# Patient Record
Sex: FEMALE | Race: WHITE | NOT HISPANIC OR LATINO | Employment: OTHER | URBAN - METROPOLITAN AREA
[De-identification: names, ages, dates, MRNs, and addresses within clinical notes are randomized per-mention and may not be internally consistent; named-entity substitution may affect disease eponyms.]

---

## 2017-01-05 ENCOUNTER — LAB REQUISITION (OUTPATIENT)
Dept: LAB | Facility: HOSPITAL | Age: 57
End: 2017-01-05
Payer: COMMERCIAL

## 2017-01-05 DIAGNOSIS — E55.9 VITAMIN D DEFICIENCY: ICD-10-CM

## 2017-01-05 LAB — 25(OH)D3 SERPL-MCNC: 25.5 NG/ML (ref 30–100)

## 2017-01-05 PROCEDURE — 82306 VITAMIN D 25 HYDROXY: CPT | Performed by: INTERNAL MEDICINE

## 2017-01-12 ENCOUNTER — GENERIC CONVERSION - ENCOUNTER (OUTPATIENT)
Dept: OTHER | Facility: OTHER | Age: 57
End: 2017-01-12

## 2017-04-26 ENCOUNTER — ALLSCRIPTS OFFICE VISIT (OUTPATIENT)
Dept: OTHER | Facility: OTHER | Age: 57
End: 2017-04-26

## 2017-06-18 ENCOUNTER — HOSPITAL ENCOUNTER (EMERGENCY)
Facility: HOSPITAL | Age: 57
Discharge: HOME | End: 2017-06-19
Attending: EMERGENCY MEDICINE | Admitting: EMERGENCY MEDICINE
Payer: COMMERCIAL

## 2017-06-18 VITALS
HEART RATE: 83 BPM | DIASTOLIC BLOOD PRESSURE: 66 MMHG | TEMPERATURE: 98.6 F | RESPIRATION RATE: 16 BRPM | BODY MASS INDEX: 31.36 KG/M2 | HEIGHT: 62 IN | SYSTOLIC BLOOD PRESSURE: 126 MMHG | WEIGHT: 170.4 LBS | OXYGEN SATURATION: 97 %

## 2017-06-18 DIAGNOSIS — T14.8XXA MUSCLE STRAIN: Primary | ICD-10-CM

## 2017-06-18 DIAGNOSIS — S16.1XXA CERVICAL STRAIN, INITIAL ENCOUNTER: ICD-10-CM

## 2017-06-18 RX ORDER — QUETIAPINE FUMARATE 25 MG/1
50 TABLET, FILM COATED ORAL 2 TIMES DAILY PRN
COMMUNITY
End: 2018-03-12 | Stop reason: SDUPTHER

## 2017-06-18 RX ORDER — ALPRAZOLAM 2 MG/1
2 TABLET ORAL 2 TIMES DAILY PRN
COMMUNITY
End: 2018-03-12 | Stop reason: SDUPTHER

## 2017-06-18 RX ORDER — ACETAMINOPHEN 325 MG/1
975 TABLET ORAL ONCE
Status: COMPLETED | OUTPATIENT
Start: 2017-06-18 | End: 2017-06-18

## 2017-06-18 RX ADMIN — ACETAMINOPHEN 975 MG: 325 TABLET, FILM COATED ORAL at 23:26

## 2017-06-19 PROCEDURE — 99283 EMERGENCY DEPT VISIT LOW MDM: CPT

## 2017-06-29 ENCOUNTER — GENERIC CONVERSION - ENCOUNTER (OUTPATIENT)
Dept: OTHER | Facility: OTHER | Age: 57
End: 2017-06-29

## 2017-06-30 ENCOUNTER — HOSPITAL ENCOUNTER (EMERGENCY)
Facility: HOSPITAL | Age: 57
Discharge: HOME | End: 2017-06-30
Attending: EMERGENCY MEDICINE | Admitting: EMERGENCY MEDICINE
Payer: COMMERCIAL

## 2017-06-30 VITALS
TEMPERATURE: 98 F | SYSTOLIC BLOOD PRESSURE: 156 MMHG | HEART RATE: 70 BPM | RESPIRATION RATE: 18 BRPM | OXYGEN SATURATION: 97 % | DIASTOLIC BLOOD PRESSURE: 77 MMHG | WEIGHT: 162 LBS | BODY MASS INDEX: 29.63 KG/M2

## 2017-06-30 DIAGNOSIS — T15.90XA FOREIGN BODY IN EYE: Primary | ICD-10-CM

## 2017-06-30 PROCEDURE — 99283 EMERGENCY DEPT VISIT LOW MDM: CPT

## 2017-06-30 RX ORDER — PROPARACAINE HYDROCHLORIDE 5 MG/ML
1 SOLUTION/ DROPS OPHTHALMIC ONCE
Status: COMPLETED | OUTPATIENT
Start: 2017-06-30 | End: 2017-06-30

## 2017-06-30 RX ADMIN — FLUORESCEIN SODIUM 1 STRIP: 1 STRIP OPHTHALMIC at 21:53

## 2017-06-30 RX ADMIN — PROPARACAINE HYDROCHLORIDE 1 DROP: 5 SOLUTION/ DROPS OPHTHALMIC at 21:53

## 2018-01-15 NOTE — MISCELLANEOUS
Provider Comments  Provider Comments:   4/26/17-PT  MISSED APPT  TODAY/CW      Signatures   Electronically signed by : Ming Gale DO;  Apr 28 2017 11:45AM EST                       (Author)

## 2018-01-18 NOTE — MISCELLANEOUS
Provider Comments  Provider Comments:   Dear Brittany James     We called you about your scheduled appointment for 06/29/2017 today but were unable to reach you  It is very important that you follow up with us so that we can assess your physical and nutritional safety  Please call our office at [phone] to reschedule your appointment       Sincerely,     Joss Pal [facility]            Signatures   Electronically signed by : Aniceto Zelaya, ; Jun 29 2017  9:29AM EST                       (Author)

## 2018-03-12 ENCOUNTER — LAB (OUTPATIENT)
Dept: LAB | Facility: CLINIC | Age: 58
End: 2018-03-12
Payer: COMMERCIAL

## 2018-03-12 ENCOUNTER — OFFICE VISIT (OUTPATIENT)
Dept: INTERNAL MEDICINE CLINIC | Facility: CLINIC | Age: 58
End: 2018-03-12
Payer: COMMERCIAL

## 2018-03-12 VITALS
WEIGHT: 157.6 LBS | OXYGEN SATURATION: 98 % | BODY MASS INDEX: 29 KG/M2 | HEIGHT: 62 IN | SYSTOLIC BLOOD PRESSURE: 130 MMHG | HEART RATE: 70 BPM | TEMPERATURE: 98 F | RESPIRATION RATE: 18 BRPM | DIASTOLIC BLOOD PRESSURE: 80 MMHG

## 2018-03-12 DIAGNOSIS — F41.0 PANIC ATTACKS: ICD-10-CM

## 2018-03-12 DIAGNOSIS — F39 MOOD DISORDER (HCC): ICD-10-CM

## 2018-03-12 DIAGNOSIS — F41.9 ANXIETY: ICD-10-CM

## 2018-03-12 DIAGNOSIS — F90.2 ADHD (ATTENTION DEFICIT HYPERACTIVITY DISORDER), COMBINED TYPE: ICD-10-CM

## 2018-03-12 DIAGNOSIS — R07.9 CHEST PAIN, UNSPECIFIED TYPE: ICD-10-CM

## 2018-03-12 DIAGNOSIS — R00.2 PALPITATIONS: Primary | ICD-10-CM

## 2018-03-12 PROBLEM — E66.3 OVERWEIGHT: Status: ACTIVE | Noted: 2018-03-12

## 2018-03-12 LAB
ALBUMIN SERPL BCP-MCNC: 3.9 G/DL (ref 3.5–5)
ALP SERPL-CCNC: 72 U/L (ref 46–116)
ALT SERPL W P-5'-P-CCNC: 20 U/L (ref 12–78)
ANION GAP SERPL CALCULATED.3IONS-SCNC: 9 MMOL/L (ref 4–13)
AST SERPL W P-5'-P-CCNC: 16 U/L (ref 5–45)
BASOPHILS # BLD AUTO: 0.01 THOUSANDS/ΜL (ref 0–0.1)
BASOPHILS NFR BLD AUTO: 0 % (ref 0–1)
BILIRUB SERPL-MCNC: 0.3 MG/DL (ref 0.2–1)
BUN SERPL-MCNC: 16 MG/DL (ref 5–25)
CALCIUM SERPL-MCNC: 9.3 MG/DL (ref 8.3–10.1)
CHLORIDE SERPL-SCNC: 103 MMOL/L (ref 100–108)
CO2 SERPL-SCNC: 28 MMOL/L (ref 21–32)
CREAT SERPL-MCNC: 0.84 MG/DL (ref 0.6–1.3)
ECG INTERP DURING EX: NORMAL MS
EOSINOPHIL # BLD AUTO: 0.08 THOUSAND/ΜL (ref 0–0.61)
EOSINOPHIL NFR BLD AUTO: 1 % (ref 0–6)
ERYTHROCYTE [DISTWIDTH] IN BLOOD BY AUTOMATED COUNT: 15.1 % (ref 11.6–15.1)
GFR SERPL CREATININE-BSD FRML MDRD: 76 ML/MIN/1.73SQ M
GLUCOSE SERPL-MCNC: 83 MG/DL (ref 65–140)
HCT VFR BLD AUTO: 38.8 % (ref 34.8–46.1)
HGB BLD-MCNC: 13.5 G/DL (ref 11.5–15.4)
LYMPHOCYTES # BLD AUTO: 2.67 THOUSANDS/ΜL (ref 0.6–4.47)
LYMPHOCYTES NFR BLD AUTO: 34 % (ref 14–44)
MCH RBC QN AUTO: 28.1 PG (ref 26.8–34.3)
MCHC RBC AUTO-ENTMCNC: 34.8 G/DL (ref 31.4–37.4)
MCV RBC AUTO: 81 FL (ref 82–98)
MONOCYTES # BLD AUTO: 0.42 THOUSAND/ΜL (ref 0.17–1.22)
MONOCYTES NFR BLD AUTO: 5 % (ref 4–12)
NEUTROPHILS # BLD AUTO: 4.78 THOUSANDS/ΜL (ref 1.85–7.62)
NEUTS SEG NFR BLD AUTO: 60 % (ref 43–75)
PLATELET # BLD AUTO: 295 THOUSANDS/UL (ref 149–390)
PMV BLD AUTO: 9.7 FL (ref 8.9–12.7)
POTASSIUM SERPL-SCNC: 4.3 MMOL/L (ref 3.5–5.3)
PROT SERPL-MCNC: 7.6 G/DL (ref 6.4–8.2)
RBC # BLD AUTO: 4.81 MILLION/UL (ref 3.81–5.12)
SODIUM SERPL-SCNC: 140 MMOL/L (ref 136–145)
T4 FREE SERPL-MCNC: 0.99 NG/DL (ref 0.76–1.46)
TSH SERPL DL<=0.05 MIU/L-ACNC: 4.38 UIU/ML (ref 0.36–3.74)
WBC # BLD AUTO: 7.96 THOUSAND/UL (ref 4.31–10.16)

## 2018-03-12 PROCEDURE — 84443 ASSAY THYROID STIM HORMONE: CPT | Performed by: INTERNAL MEDICINE

## 2018-03-12 PROCEDURE — 84439 ASSAY OF FREE THYROXINE: CPT | Performed by: INTERNAL MEDICINE

## 2018-03-12 PROCEDURE — 93010 ELECTROCARDIOGRAM REPORT: CPT | Performed by: INTERNAL MEDICINE

## 2018-03-12 PROCEDURE — 80053 COMPREHEN METABOLIC PANEL: CPT | Performed by: INTERNAL MEDICINE

## 2018-03-12 PROCEDURE — 85025 COMPLETE CBC W/AUTO DIFF WBC: CPT | Performed by: INTERNAL MEDICINE

## 2018-03-12 PROCEDURE — 36415 COLL VENOUS BLD VENIPUNCTURE: CPT | Performed by: INTERNAL MEDICINE

## 2018-03-12 PROCEDURE — 99214 OFFICE O/P EST MOD 30 MIN: CPT | Performed by: INTERNAL MEDICINE

## 2018-03-12 RX ORDER — DEXTROAMPHETAMINE SACCHARATE, AMPHETAMINE ASPARTATE, DEXTROAMPHETAMINE SULFATE AND AMPHETAMINE SULFATE 5; 5; 5; 5 MG/1; MG/1; MG/1; MG/1
3 TABLET ORAL DAILY
COMMUNITY
Start: 2014-08-13 | End: 2018-03-12 | Stop reason: SDUPTHER

## 2018-03-12 RX ORDER — SERTRALINE HYDROCHLORIDE 100 MG/1
100 TABLET, FILM COATED ORAL 2 TIMES DAILY
Refills: 0 | COMMUNITY
Start: 2018-02-19 | End: 2020-08-19 | Stop reason: ALTCHOICE

## 2018-03-12 RX ORDER — QUETIAPINE FUMARATE 25 MG/1
TABLET, FILM COATED ORAL
Qty: 90 TABLET | Refills: 0
Start: 2018-03-12 | End: 2018-09-17 | Stop reason: SDUPTHER

## 2018-03-12 RX ORDER — DEXTROAMPHETAMINE SACCHARATE, AMPHETAMINE ASPARTATE, DEXTROAMPHETAMINE SULFATE AND AMPHETAMINE SULFATE 5; 5; 5; 5 MG/1; MG/1; MG/1; MG/1
1 TABLET ORAL 3 TIMES DAILY
Qty: 90 TABLET | Refills: 0 | Status: SHIPPED | OUTPATIENT
Start: 2018-03-12 | End: 2018-07-03 | Stop reason: SDUPTHER

## 2018-03-12 RX ORDER — ALPRAZOLAM 2 MG/1
TABLET ORAL
Qty: 75 TABLET | Refills: 0 | Status: SHIPPED | OUTPATIENT
Start: 2018-03-12 | End: 2018-04-17 | Stop reason: SDUPTHER

## 2018-03-12 RX ORDER — TRAZODONE HYDROCHLORIDE 50 MG/1
50 TABLET ORAL 2 TIMES DAILY
Refills: 0 | COMMUNITY
Start: 2018-03-11 | End: 2018-04-17 | Stop reason: SDUPTHER

## 2018-03-12 NOTE — ASSESSMENT & PLAN NOTE
Continue sertraline 200 mg daily  Instructed to take home 1 tablet of trazodone and 2 tablets of Seroquel q h s  to limit vivid dreams  Refilled alprazolam, can take up to 2 5 tablets daily, PDMP reviewed  She has an appointment with Psychiatry later this month

## 2018-03-12 NOTE — PROGRESS NOTES
Assessment/Plan:    Anxiety  Continue sertraline 200 mg daily  Instructed to take home 1 tablet of trazodone and 2 tablets of Seroquel q h s  to limit vivid dreams  Refilled alprazolam, can take up to 2 5 tablets daily, PDMP reviewed  She has an appointment with Psychiatry later this month  ADHD (attention deficit hyperactivity disorder), combined type  Takes Adderall tid  PDMP reviewed  Diagnoses and all orders for this visit:    Palpitations  Comments:  Normal EKG  Epiosodes of dizziness may be related to anxiety, change labs  Orders:  -     POCT ECG  -     CBC and differential  -     Comprehensive metabolic panel  -     TSH, 3rd generation with T4 reflex    ADHD (attention deficit hyperactivity disorder), combined type  -     amphetamine-dextroamphetamine (ADDERALL, 20MG,) 20 mg tablet; Take 1 tablet (20 mg total) by mouth 3 (three) times a day Max Daily Amount: 60 mg    Anxiety  -     ALPRAZolam (XANAX) 2 MG tablet; Take 1 to 1 5 tablets 2x a day as needed  -     QUEtiapine (SEROquel) 25 mg tablet; Take 1-3 tablets qHS prn    Mood disorder (HCC)  -     QUEtiapine (SEROquel) 25 mg tablet; Take 1-3 tablets qHS prn    Panic attacks    Chest pain, unspecified type    Other orders  -     traZODone (DESYREL) 50 mg tablet; Take 50 mg by mouth 2 (two) times a day    -     Discontinue: amphetamine-dextroamphetamine (ADDERALL, 20MG,) 20 mg tablet; Take 3 tablets by mouth daily  -     sertraline (ZOLOFT) 100 mg tablet; Take 100 mg by mouth 2 (two) times a day        Follow up in 1 month  Form to be completed  Subjective:      Patient ID: Bill Dixon is a 59 y o  female  Mrs Acosta complains of palpitations and increased anxiety  She reports that her  has been incarcerated for the past 8 months  She has been dealing with personal and social issues on her own  She does have a son and daughter in the area, feels that they do not want to be involved with her    She is worried about finances, has sold a lot of things  She is unable to find employment  She reports increased anxiety attacks the past few months  She reports that she is feeling more shaky since her  has been away  She noticed that she is feeling more dizzy and noticed some blurring of vision the past few days  Denies any headache, extremity weakness or numbness  She denies any syncopal events  She denies any chest heaviness, just feels that her house is pounding in her chest     She has an appointment with psychiatry and counseling  Her previous psychiatrist, who does not take her insurance, has refilled her medications but she ran out of Xanax about a week ago  She reports vivid dreams if she takes more than 2 tablets of trazodone at night  She feels very shaky if she takes Seroquel more than 2 tablets regularly  She applied insurance with [facility], has a meeting with someone to help her with her resume and and to help her look for a job  The following portions of the patient's history were reviewed and updated as appropriate: current medications, past medical history, past social history and problem list     Review of Systems   Constitutional: Positive for activity change and fatigue  Eyes: Negative for visual disturbance  Respiratory: Negative for cough and shortness of breath  Cardiovascular: Positive for palpitations  Negative for chest pain and leg swelling  Gastrointestinal: Negative for abdominal pain, constipation, diarrhea and nausea  Musculoskeletal: Negative for arthralgias and myalgias  Skin: Negative for rash and wound  Neurological: Negative for dizziness and numbness  Hematological: Does not bruise/bleed easily  Psychiatric/Behavioral: Positive for agitation, behavioral problems and sleep disturbance  Negative for confusion and suicidal ideas  The patient is nervous/anxious            Objective:      /80   Pulse 70   Temp 98 °F (36 7 °C)   Resp 18   Ht 5' 2" (1 575 m)   Wt 71 5 kg (157 lb 9 6 oz)   SpO2 98%   BMI 28 83 kg/m²          Physical Exam   Constitutional: She is oriented to person, place, and time  She appears well-developed and well-nourished  HENT:   Head: Normocephalic and atraumatic  Nose: Nose normal    Eyes: Conjunctivae are normal  Pupils are equal, round, and reactive to light  Neck: Neck supple  Cardiovascular: Normal rate, regular rhythm and normal heart sounds  No chest wall tenderness  No edema  Pulmonary/Chest: Effort normal and breath sounds normal  She has no wheezes  She has no rales  Abdominal: Soft  Bowel sounds are normal    Neurological: She is alert and oriented to person, place, and time  (+) tremors, both hands   Skin: Skin is warm  No rash noted  Psychiatric: Her behavior is normal  Her mood appears anxious  Her speech is not rapid and/or pressured  She is not agitated and not aggressive  Cognition and memory are not impaired  Nursing note and vitals reviewed  EKG: normal EKG, normal sinus rhythm, unchanged from previous tracings

## 2018-03-13 ENCOUNTER — TELEPHONE (OUTPATIENT)
Dept: INTERNAL MEDICINE CLINIC | Facility: CLINIC | Age: 58
End: 2018-03-13

## 2018-03-13 NOTE — TELEPHONE ENCOUNTER
RICHARD HELMS IN [place] CALLED  ADDERALL 20 MG NEEDS A PRIOR AUTH  6  IS THE NUMBER TO CALL TO START THE PRIOR AUTH

## 2018-03-13 NOTE — TELEPHONE ENCOUNTER
Labs all okay except for thyroid, slightly abnormal but not enough to start medication  Will just monitor this for now      Started prior auth for your Adderall

## 2018-03-19 NOTE — TELEPHONE ENCOUNTER
Prior Deneen Alken was never started because we were never able to get a hold of patient to confirm information ( please read below conversation) I tried calling patient again and phone number still unable to receive calls at this time   We can not start without patient calling back

## 2018-03-19 NOTE — TELEPHONE ENCOUNTER
Please call [facility] pharmacy with approval or denial on the adderall   [phone]

## 2018-03-22 NOTE — TELEPHONE ENCOUNTER
Pt  CALLED  SHE SPOKE WITH HER INS  Co  AND THEY SAID TO CALL FOR PRIOR AUTH AGAIN  HER PRESCRIPTION ID No  IS [id number]

## 2018-03-22 NOTE — TELEPHONE ENCOUNTER
Patient is going to call her insurance so that they can provide her with the RX #  She did not have that on her  Will call back with update  Please get RX benefits # when patient calls

## 2018-03-26 NOTE — TELEPHONE ENCOUNTER
Call pharmacy  If patient looking for medication, needs to call office asap    Not returning our calls

## 2018-04-03 NOTE — TELEPHONE ENCOUNTER
Spoke with Adriano Vera at [facility] ([phone])  They states they sent us a form with additional info required- they are faxing this again   Awaiting fax

## 2018-04-05 NOTE — TELEPHONE ENCOUNTER
Spoke with Dr Corene Snellen who states she did not receive this form  Called and LM for fax team to have this sent to our office ASAP

## 2018-04-05 NOTE — TELEPHONE ENCOUNTER
Please call patient  Ask what medication besides Adderall has she tried in the past? Need it for the prior auth  What medications has she tried, side effects etc

## 2018-04-06 NOTE — TELEPHONE ENCOUNTER
Pt notified and states she is unsure what the other medication she tried was but did have a side effect to it   States she will call her last dr who prescribed it for the info and will cb

## 2018-04-17 DIAGNOSIS — F41.9 ANXIETY: ICD-10-CM

## 2018-04-18 RX ORDER — ALPRAZOLAM 2 MG/1
TABLET ORAL
Qty: 75 TABLET | Refills: 0 | Status: SHIPPED | OUTPATIENT
Start: 2018-04-18 | End: 2018-05-26 | Stop reason: SDUPTHER

## 2018-04-18 RX ORDER — TRAZODONE HYDROCHLORIDE 50 MG/1
50 TABLET ORAL 2 TIMES DAILY
Qty: 60 TABLET | Refills: 0 | Status: SHIPPED | OUTPATIENT
Start: 2018-04-18 | End: 2018-05-26 | Stop reason: SDUPTHER

## 2018-04-20 ENCOUNTER — TELEPHONE (OUTPATIENT)
Dept: INTERNAL MEDICINE CLINIC | Facility: CLINIC | Age: 58
End: 2018-04-20

## 2018-04-20 NOTE — TELEPHONE ENCOUNTER
Can have [facility] fax to us a form that I can complete, it will only give her < 30 days to settle the account    Let her know she missed her appt yesterday, will not refill any of her meds if not seen next time

## 2018-04-20 NOTE — TELEPHONE ENCOUNTER
Gretel Marie TO CALL HER Re: HER MEDICAL CONDITION-SHE NEEDS HELP WITH CALLING [facility] BECAUSE THEY ARE THREATENING TO TURN OFF HER ELECTRICITY  HER  IS AWAY AND IS SUPPOSED TO BE COMING HOME NEXT WEEK AND WILL BE ABLE TO GET THINGS STRAIGHTENED OUT  SHE IS HAVING A HARD TIME LEAVING THE HOUSE DUE TO HER CONDITION

## 2018-04-24 ENCOUNTER — TELEPHONE (OUTPATIENT)
Dept: INTERNAL MEDICINE CLINIC | Facility: CLINIC | Age: 58
End: 2018-04-24

## 2018-04-24 NOTE — TELEPHONE ENCOUNTER
Pt  CALLED TO SCHEDULE AN APPT  NO OPENINGS UNTIL June  PT  SAID SHE WILL HAVE A RIDE TODAY AND TOMORROW  Dr Barbara Giraldo CAN SEE PSYCHIATRIST  SHE IS ON ADERALL BECAUSE HER OLD PSYCH TOOK HER OFF OF [facility] BECAUSE IT MADE HER SHAKY  SHE DID TRY OTHER MEDS FOR AWHILE BUT HE HAD HER ON ADERALL UNTIL SHE SEES ANOTHER PSYCHIATRIST  IT IS HELPING HER  SHE IS GOING TO SEE Dr Joe Ponce  ALSO, SHE WANTS US TO CALL [facility] TO FAX US A FORM SO THEY WON'T SHUT OFF HER ELECTRICITY  CALL THEM [phone]  FAX # [phone]  HER ACCT  No  IS [id number]

## 2018-04-24 NOTE — TELEPHONE ENCOUNTER
Can schedule for 1 pm tomorrow  Need to keep that appt    Will restart prior auth for Adderall again      Please call [facility] to fax us form

## 2018-04-25 ENCOUNTER — OFFICE VISIT (OUTPATIENT)
Dept: INTERNAL MEDICINE CLINIC | Facility: CLINIC | Age: 58
End: 2018-04-25
Payer: COMMERCIAL

## 2018-04-25 VITALS
SYSTOLIC BLOOD PRESSURE: 116 MMHG | HEART RATE: 78 BPM | DIASTOLIC BLOOD PRESSURE: 80 MMHG | RESPIRATION RATE: 18 BRPM | OXYGEN SATURATION: 98 % | WEIGHT: 154.4 LBS | HEIGHT: 62 IN | BODY MASS INDEX: 28.41 KG/M2 | TEMPERATURE: 97.9 F

## 2018-04-25 DIAGNOSIS — F39 MOOD DISORDER (HCC): ICD-10-CM

## 2018-04-25 DIAGNOSIS — F90.2 ADHD (ATTENTION DEFICIT HYPERACTIVITY DISORDER), COMBINED TYPE: ICD-10-CM

## 2018-04-25 DIAGNOSIS — F41.9 ANXIETY: ICD-10-CM

## 2018-04-25 DIAGNOSIS — R22.1 NECK FULLNESS: Primary | ICD-10-CM

## 2018-04-25 PROBLEM — E55.9 VITAMIN D DEFICIENCY: Status: ACTIVE | Noted: 2018-04-25

## 2018-04-25 PROBLEM — R79.89 ABNORMAL THYROID BLOOD TEST: Status: ACTIVE | Noted: 2018-04-25

## 2018-04-25 PROCEDURE — 3008F BODY MASS INDEX DOCD: CPT | Performed by: INTERNAL MEDICINE

## 2018-04-25 PROCEDURE — 99214 OFFICE O/P EST MOD 30 MIN: CPT | Performed by: INTERNAL MEDICINE

## 2018-04-25 NOTE — PROGRESS NOTES
Assessment/Plan:    Anxiety  Improved since  coming home soon  She is on sertraline 200 mg daily, trazodone 100 mg daily, Seroquel prn  She takes alprazolam regularly, discussed to try and take less in the morning  Keep appointment with psychiatry in August     ADHD (attention deficit hyperactivity disorder), combined type  Resubmit prior auth for Adderall  She was on Vyvanse in the past     Abnormal thyroid blood test  She is feeling better, no medications at this time  Will just monitor symptoms  Vitamin D deficiency  Start daily supplement  Diagnoses and all orders for this visit:    Neck fullness  Comments:  Check thyroid ultrasound, no palpable masses  Orders:  -     US thyroid; Future    Anxiety    ADHD (attention deficit hyperactivity disorder), combined type    Mood disorder (HCC)          Subjective:      Patient ID: Terrell Craig is a 59 y o  female  Mrs Garland Sacks is doing better since her last visit  Her  will be released within the next week, she feels more comfortable now that he is coming back home  She continues to get very anxious, not trembling any more  She takes sertraline daily, takes Seroquel only as needed  She would usually take half tablet as needed when she feels a panic attack is coming  She continues to take Xanax regularly  She tries not to take it in the morning if she feels well  She scheduled an appointment with Psychiatry, not until August     She has been without Adderall for the past few weeks  She feels she is doing okay but still having difficulty focusing  She also complains of fullness in her neck, feels her glands are swollen  Denies any recent sore throat or colds  The following portions of the patient's history were reviewed and updated as appropriate: allergies, current medications, past medical history, past social history and problem list     Review of Systems   Constitutional: Negative for appetite change and fatigue  HENT: Negative for congestion, ear pain and postnasal drip  Eyes: Negative for visual disturbance  Respiratory: Negative for cough and shortness of breath  Cardiovascular: Negative for chest pain and leg swelling  Gastrointestinal: Negative for abdominal pain, constipation and diarrhea  Genitourinary: Negative for dysuria, frequency and urgency  Musculoskeletal: Negative for arthralgias and myalgias  Skin: Negative for rash and wound  Neurological: Negative for dizziness, numbness and headaches  Hematological: Does not bruise/bleed easily  Psychiatric/Behavioral: Positive for dysphoric mood and sleep disturbance  Negative for confusion  The patient is nervous/anxious  Objective:      /80   Pulse 78   Temp 97 9 °F (36 6 °C)   Resp 18   Ht 5' 2" (1 575 m)   Wt 70 kg (154 lb 6 4 oz)   SpO2 98%   BMI 28 24 kg/m²          Physical Exam   Constitutional: She is oriented to person, place, and time  She appears well-developed and well-nourished  HENT:   Head: Normocephalic and atraumatic  Nose: Nose normal    Eyes: Conjunctivae are normal  Pupils are equal, round, and reactive to light  Neck: Neck supple  Cardiovascular: Normal rate, regular rhythm and normal heart sounds  No edema  Pulmonary/Chest: Effort normal and breath sounds normal  She has no wheezes  She has no rales  Abdominal: Soft  Bowel sounds are normal    Neurological: She is alert and oriented to person, place, and time  Skin: Skin is warm  No rash noted  Psychiatric: She has a normal mood and affect  Her behavior is normal    Nursing note and vitals reviewed  Medications and lab results reviewed with patient

## 2018-04-25 NOTE — ASSESSMENT & PLAN NOTE
Improved since  coming home soon  She is on sertraline 200 mg daily, trazodone 100 mg daily, Seroquel prn  She takes alprazolam regularly, discussed to try and take less in the morning    Keep appointment with psychiatry in August

## 2018-04-25 NOTE — TELEPHONE ENCOUNTER
Resubmitted PA for medication       Called [facility] and spoke with Brandy, they are faxing a form over to be completed

## 2018-04-30 ENCOUNTER — TELEPHONE (OUTPATIENT)
Dept: INTERNAL MEDICINE CLINIC | Facility: CLINIC | Age: 58
End: 2018-04-30

## 2018-04-30 NOTE — TELEPHONE ENCOUNTER
[facility] called and stated Adderall 20 mg tablet will need a PA  It is being rejected by insurance due to patients age  Pt insurance [facility] phone # [phone]

## 2018-05-02 NOTE — TELEPHONE ENCOUNTER
[facility] called asking if there is an update on the prior auth for the adderall  Call [facility] at [phone]

## 2018-05-02 NOTE — TELEPHONE ENCOUNTER
[facility] and spoke with pharmacist  Tracey Pichardo them list of pts symptoms  This is being approved for 6 months- they will fax info to office   Notified pharmacy

## 2018-05-26 DIAGNOSIS — F41.9 ANXIETY: ICD-10-CM

## 2018-05-26 RX ORDER — ALPRAZOLAM 2 MG/1
TABLET ORAL
Qty: 75 TABLET | Refills: 0 | Status: SHIPPED | OUTPATIENT
Start: 2018-05-26 | End: 2018-07-03 | Stop reason: SDUPTHER

## 2018-05-26 RX ORDER — TRAZODONE HYDROCHLORIDE 50 MG/1
50 TABLET ORAL 2 TIMES DAILY
Qty: 60 TABLET | Refills: 1 | Status: SHIPPED | OUTPATIENT
Start: 2018-05-26 | End: 2018-07-03 | Stop reason: SDUPTHER

## 2018-07-03 DIAGNOSIS — F90.2 ADHD (ATTENTION DEFICIT HYPERACTIVITY DISORDER), COMBINED TYPE: ICD-10-CM

## 2018-07-03 DIAGNOSIS — F41.9 ANXIETY: ICD-10-CM

## 2018-07-03 RX ORDER — DEXTROAMPHETAMINE SACCHARATE, AMPHETAMINE ASPARTATE, DEXTROAMPHETAMINE SULFATE AND AMPHETAMINE SULFATE 5; 5; 5; 5 MG/1; MG/1; MG/1; MG/1
1 TABLET ORAL 3 TIMES DAILY
Qty: 90 TABLET | Refills: 0 | Status: SHIPPED | OUTPATIENT
Start: 2018-07-03 | End: 2018-08-16 | Stop reason: SDUPTHER

## 2018-07-03 RX ORDER — TRAZODONE HYDROCHLORIDE 50 MG/1
50 TABLET ORAL 2 TIMES DAILY
Qty: 60 TABLET | Refills: 5 | Status: SHIPPED | OUTPATIENT
Start: 2018-07-03 | End: 2018-08-16 | Stop reason: SDUPTHER

## 2018-07-03 RX ORDER — ALPRAZOLAM 2 MG/1
TABLET ORAL
Qty: 75 TABLET | Refills: 0 | Status: SHIPPED | OUTPATIENT
Start: 2018-07-03 | End: 2018-08-16 | Stop reason: SDUPTHER

## 2018-08-16 DIAGNOSIS — F90.2 ADHD (ATTENTION DEFICIT HYPERACTIVITY DISORDER), COMBINED TYPE: ICD-10-CM

## 2018-08-16 DIAGNOSIS — F41.9 ANXIETY: ICD-10-CM

## 2018-08-16 RX ORDER — TRAZODONE HYDROCHLORIDE 50 MG/1
50 TABLET ORAL 2 TIMES DAILY
Qty: 60 TABLET | Refills: 5 | Status: SHIPPED | OUTPATIENT
Start: 2018-08-16 | End: 2018-10-17 | Stop reason: SDUPTHER

## 2018-08-16 RX ORDER — DEXTROAMPHETAMINE SACCHARATE, AMPHETAMINE ASPARTATE, DEXTROAMPHETAMINE SULFATE AND AMPHETAMINE SULFATE 5; 5; 5; 5 MG/1; MG/1; MG/1; MG/1
1 TABLET ORAL 3 TIMES DAILY
Qty: 90 TABLET | Refills: 0 | Status: SHIPPED | OUTPATIENT
Start: 2018-08-16 | End: 2018-09-17 | Stop reason: SDUPTHER

## 2018-08-16 RX ORDER — ALPRAZOLAM 2 MG/1
TABLET ORAL
Qty: 60 TABLET | Refills: 0 | Status: SHIPPED | OUTPATIENT
Start: 2018-08-16 | End: 2018-09-17 | Stop reason: SDUPTHER

## 2018-09-17 ENCOUNTER — TELEPHONE (OUTPATIENT)
Dept: FAMILY MEDICINE CLINIC | Facility: CLINIC | Age: 58
End: 2018-09-17

## 2018-09-17 DIAGNOSIS — F41.9 ANXIETY: ICD-10-CM

## 2018-09-17 DIAGNOSIS — F90.2 ADHD (ATTENTION DEFICIT HYPERACTIVITY DISORDER), COMBINED TYPE: ICD-10-CM

## 2018-09-17 DIAGNOSIS — F39 MOOD DISORDER (HCC): ICD-10-CM

## 2018-09-17 RX ORDER — ALPRAZOLAM 2 MG/1
TABLET ORAL
Qty: 60 TABLET | Refills: 0 | Status: SHIPPED | OUTPATIENT
Start: 2018-09-17 | End: 2018-10-03 | Stop reason: SDUPTHER

## 2018-09-17 RX ORDER — DEXTROAMPHETAMINE SACCHARATE, AMPHETAMINE ASPARTATE, DEXTROAMPHETAMINE SULFATE AND AMPHETAMINE SULFATE 5; 5; 5; 5 MG/1; MG/1; MG/1; MG/1
1 TABLET ORAL 3 TIMES DAILY
Qty: 90 TABLET | Refills: 0 | Status: SHIPPED | OUTPATIENT
Start: 2018-09-17 | End: 2018-10-03 | Stop reason: SDUPTHER

## 2018-09-17 RX ORDER — QUETIAPINE FUMARATE 25 MG/1
TABLET, FILM COATED ORAL
Qty: 90 TABLET | Refills: 1 | Status: SHIPPED | OUTPATIENT
Start: 2018-09-17 | End: 2018-11-09 | Stop reason: SDUPTHER

## 2018-09-17 NOTE — TELEPHONE ENCOUNTER
Patient called the wrong office looking for refill of 2 medications  Stated in message she see's Dr Leona Ricks  I tried to contact patient to advise her she has a voice mail box not set up

## 2018-10-03 DIAGNOSIS — F90.2 ADHD (ATTENTION DEFICIT HYPERACTIVITY DISORDER), COMBINED TYPE: ICD-10-CM

## 2018-10-03 DIAGNOSIS — F41.9 ANXIETY: ICD-10-CM

## 2018-10-03 RX ORDER — ALPRAZOLAM 2 MG/1
TABLET ORAL
Qty: 60 TABLET | Refills: 0 | Status: SHIPPED | OUTPATIENT
Start: 2018-10-16 | End: 2018-10-17 | Stop reason: SDUPTHER

## 2018-10-03 RX ORDER — DEXTROAMPHETAMINE SACCHARATE, AMPHETAMINE ASPARTATE, DEXTROAMPHETAMINE SULFATE AND AMPHETAMINE SULFATE 5; 5; 5; 5 MG/1; MG/1; MG/1; MG/1
1 TABLET ORAL 3 TIMES DAILY
Qty: 90 TABLET | Refills: 0 | Status: SHIPPED | OUTPATIENT
Start: 2018-10-16 | End: 2018-10-17 | Stop reason: SDUPTHER

## 2018-10-17 DIAGNOSIS — F90.2 ADHD (ATTENTION DEFICIT HYPERACTIVITY DISORDER), COMBINED TYPE: ICD-10-CM

## 2018-10-17 DIAGNOSIS — F41.9 ANXIETY: ICD-10-CM

## 2018-10-18 RX ORDER — DEXTROAMPHETAMINE SACCHARATE, AMPHETAMINE ASPARTATE, DEXTROAMPHETAMINE SULFATE AND AMPHETAMINE SULFATE 5; 5; 5; 5 MG/1; MG/1; MG/1; MG/1
1 TABLET ORAL 3 TIMES DAILY
Qty: 90 TABLET | Refills: 0 | Status: SHIPPED | OUTPATIENT
Start: 2018-10-18 | End: 2018-11-14 | Stop reason: SDUPTHER

## 2018-10-18 RX ORDER — TRAZODONE HYDROCHLORIDE 50 MG/1
50 TABLET ORAL 2 TIMES DAILY
Qty: 60 TABLET | Refills: 0 | Status: SHIPPED | OUTPATIENT
Start: 2018-10-18 | End: 2020-07-24 | Stop reason: SDUPTHER

## 2018-10-18 RX ORDER — ALPRAZOLAM 2 MG/1
TABLET ORAL
Qty: 60 TABLET | Refills: 0 | Status: SHIPPED | OUTPATIENT
Start: 2018-10-18 | End: 2018-11-14 | Stop reason: SDUPTHER

## 2018-11-09 DIAGNOSIS — F41.9 ANXIETY: ICD-10-CM

## 2018-11-09 DIAGNOSIS — F39 MOOD DISORDER (HCC): ICD-10-CM

## 2018-11-09 RX ORDER — QUETIAPINE FUMARATE 25 MG/1
TABLET, FILM COATED ORAL
Qty: 90 TABLET | Refills: 0 | Status: SHIPPED | OUTPATIENT
Start: 2018-11-09 | End: 2020-08-19 | Stop reason: ALTCHOICE

## 2018-11-14 ENCOUNTER — TRANSCRIBE ORDERS (OUTPATIENT)
Dept: LAB | Facility: CLINIC | Age: 58
End: 2018-11-14

## 2018-11-14 ENCOUNTER — APPOINTMENT (OUTPATIENT)
Dept: LAB | Facility: CLINIC | Age: 58
End: 2018-11-14
Payer: COMMERCIAL

## 2018-11-14 ENCOUNTER — OFFICE VISIT (OUTPATIENT)
Dept: INTERNAL MEDICINE CLINIC | Facility: CLINIC | Age: 58
End: 2018-11-14
Payer: COMMERCIAL

## 2018-11-14 VITALS
RESPIRATION RATE: 18 BRPM | SYSTOLIC BLOOD PRESSURE: 112 MMHG | HEART RATE: 68 BPM | TEMPERATURE: 98.1 F | BODY MASS INDEX: 26.28 KG/M2 | WEIGHT: 142.8 LBS | OXYGEN SATURATION: 98 % | DIASTOLIC BLOOD PRESSURE: 78 MMHG | HEIGHT: 62 IN

## 2018-11-14 DIAGNOSIS — M85.80 OSTEOPENIA, UNSPECIFIED LOCATION: ICD-10-CM

## 2018-11-14 DIAGNOSIS — E55.9 VITAMIN D DEFICIENCY: ICD-10-CM

## 2018-11-14 DIAGNOSIS — F39 MOOD DISORDER (HCC): Primary | ICD-10-CM

## 2018-11-14 DIAGNOSIS — R79.89 ABNORMAL THYROID BLOOD TEST: ICD-10-CM

## 2018-11-14 DIAGNOSIS — F90.2 ADHD (ATTENTION DEFICIT HYPERACTIVITY DISORDER), COMBINED TYPE: ICD-10-CM

## 2018-11-14 DIAGNOSIS — F41.9 ANXIETY: ICD-10-CM

## 2018-11-14 DIAGNOSIS — Z12.31 ENCOUNTER FOR SCREENING MAMMOGRAM FOR BREAST CANCER: ICD-10-CM

## 2018-11-14 DIAGNOSIS — R53.83 OTHER FATIGUE: ICD-10-CM

## 2018-11-14 LAB
25(OH)D3 SERPL-MCNC: 29.4 NG/ML (ref 30–100)
ALBUMIN SERPL BCP-MCNC: 4 G/DL (ref 3.5–5)
ALP SERPL-CCNC: 65 U/L (ref 46–116)
ALT SERPL W P-5'-P-CCNC: 23 U/L (ref 12–78)
ANION GAP SERPL CALCULATED.3IONS-SCNC: 8 MMOL/L (ref 4–13)
AST SERPL W P-5'-P-CCNC: 16 U/L (ref 5–45)
BASOPHILS # BLD AUTO: 0.03 THOUSANDS/ΜL (ref 0–0.1)
BASOPHILS NFR BLD AUTO: 1 % (ref 0–1)
BILIRUB SERPL-MCNC: 0.3 MG/DL (ref 0.2–1)
BUN SERPL-MCNC: 13 MG/DL (ref 5–25)
CALCIUM SERPL-MCNC: 9.2 MG/DL (ref 8.3–10.1)
CHLORIDE SERPL-SCNC: 105 MMOL/L (ref 100–108)
CO2 SERPL-SCNC: 30 MMOL/L (ref 21–32)
CREAT SERPL-MCNC: 0.88 MG/DL (ref 0.6–1.3)
EOSINOPHIL # BLD AUTO: 0.1 THOUSAND/ΜL (ref 0–0.61)
EOSINOPHIL NFR BLD AUTO: 2 % (ref 0–6)
ERYTHROCYTE [DISTWIDTH] IN BLOOD BY AUTOMATED COUNT: 15.1 % (ref 11.6–15.1)
GFR SERPL CREATININE-BSD FRML MDRD: 72 ML/MIN/1.73SQ M
GLUCOSE SERPL-MCNC: 92 MG/DL (ref 65–140)
HCT VFR BLD AUTO: 39.9 % (ref 34.8–46.1)
HGB BLD-MCNC: 13.2 G/DL (ref 11.5–15.4)
IMM GRANULOCYTES # BLD AUTO: 0.02 THOUSAND/UL (ref 0–0.2)
IMM GRANULOCYTES NFR BLD AUTO: 0 % (ref 0–2)
LYMPHOCYTES # BLD AUTO: 2.32 THOUSANDS/ΜL (ref 0.6–4.47)
LYMPHOCYTES NFR BLD AUTO: 36 % (ref 14–44)
MCH RBC QN AUTO: 27.8 PG (ref 26.8–34.3)
MCHC RBC AUTO-ENTMCNC: 33.1 G/DL (ref 31.4–37.4)
MCV RBC AUTO: 84 FL (ref 82–98)
MONOCYTES # BLD AUTO: 0.36 THOUSAND/ΜL (ref 0.17–1.22)
MONOCYTES NFR BLD AUTO: 6 % (ref 4–12)
NEUTROPHILS # BLD AUTO: 3.7 THOUSANDS/ΜL (ref 1.85–7.62)
NEUTS SEG NFR BLD AUTO: 55 % (ref 43–75)
NRBC BLD AUTO-RTO: 0 /100 WBCS
PLATELET # BLD AUTO: 268 THOUSANDS/UL (ref 149–390)
PMV BLD AUTO: 9.1 FL (ref 8.9–12.7)
POTASSIUM SERPL-SCNC: 4.2 MMOL/L (ref 3.5–5.3)
PROT SERPL-MCNC: 7.3 G/DL (ref 6.4–8.2)
RBC # BLD AUTO: 4.74 MILLION/UL (ref 3.81–5.12)
SODIUM SERPL-SCNC: 143 MMOL/L (ref 136–145)
TSH SERPL DL<=0.05 MIU/L-ACNC: 2.59 UIU/ML (ref 0.36–3.74)
WBC # BLD AUTO: 6.53 THOUSAND/UL (ref 4.31–10.16)

## 2018-11-14 PROCEDURE — 85025 COMPLETE CBC W/AUTO DIFF WBC: CPT | Performed by: INTERNAL MEDICINE

## 2018-11-14 PROCEDURE — 82306 VITAMIN D 25 HYDROXY: CPT | Performed by: INTERNAL MEDICINE

## 2018-11-14 PROCEDURE — 36415 COLL VENOUS BLD VENIPUNCTURE: CPT | Performed by: INTERNAL MEDICINE

## 2018-11-14 PROCEDURE — 3008F BODY MASS INDEX DOCD: CPT | Performed by: INTERNAL MEDICINE

## 2018-11-14 PROCEDURE — 80053 COMPREHEN METABOLIC PANEL: CPT | Performed by: INTERNAL MEDICINE

## 2018-11-14 PROCEDURE — 84443 ASSAY THYROID STIM HORMONE: CPT | Performed by: INTERNAL MEDICINE

## 2018-11-14 PROCEDURE — 99214 OFFICE O/P EST MOD 30 MIN: CPT | Performed by: INTERNAL MEDICINE

## 2018-11-14 RX ORDER — ALPRAZOLAM 2 MG/1
TABLET ORAL
Qty: 60 TABLET | Refills: 1 | Status: SHIPPED | OUTPATIENT
Start: 2018-11-17 | End: 2020-08-19 | Stop reason: SDUPTHER

## 2018-11-14 RX ORDER — DEXTROAMPHETAMINE SACCHARATE, AMPHETAMINE ASPARTATE, DEXTROAMPHETAMINE SULFATE AND AMPHETAMINE SULFATE 5; 5; 5; 5 MG/1; MG/1; MG/1; MG/1
1 TABLET ORAL 3 TIMES DAILY
Qty: 90 TABLET | Refills: 0 | Status: SHIPPED | OUTPATIENT
Start: 2018-11-21 | End: 2020-07-24

## 2018-11-14 NOTE — PROGRESS NOTES
Assessment/Plan:    Anxiety  Maintain on sertraline 200 mg, trazodone 100 mg daily  Instructed to take Seroquel every night, 25 mg  May increase dose as needed  Instructed to take alprazolam 1/2 tablet at a time, explained may be able to decrease dose and frequency when feeling better  She has not seen psychiatry, stopped seeing the counselor  PDMP reviewed  ADHD (attention deficit hyperactivity disorder), combined type  Takes Adderall regularly  Osteopenia  Not on supplements, due for bone density  Abnormal thyroid blood test  Recheck TFTs, more symptomatic lately  Diagnoses and all orders for this visit:    Mood disorder ([facility] [address] )    Anxiety  -     ALPRAZolam (XANAX) 2 MG tablet; Take 1  tablet 2x a day as needed    ADHD (attention deficit hyperactivity disorder), combined type  -     amphetamine-dextroamphetamine (ADDERALL, 20MG,) 20 mg tablet; Take 1 tablet (20 mg total) by mouth 3 (three) times a day Max Daily Amount: 60 mg    Abnormal thyroid blood test  -     CBC and differential  -     TSH, 3rd generation with Free T4 reflex    Vitamin D deficiency  -     Vitamin D 25 hydroxy    Osteopenia, unspecified location  -     CBC and differential  -     Comprehensive metabolic panel  -     DXA bone density spine hip and pelvis; Future    Encounter for screening mammogram for breast cancer  -     Mammo screening bilateral w cad; Future    Other fatigue      Follow up in 6 months or as needed  Subjective:      Patient ID: Franci Munguia is a 59 y o  female  [facility] [place] is feeling alright  She lost her home, currently living in a motel  She has found a job in [employer], working 7 days a week  She reports feeling very tired at the end of the day, has had no issues with sleep  She would sometimes not take the Seroquel and would have anxiety episodes of few days later  She continues to take Xanax regularly, usually takes half a tablet at a time, at least twice a day      She reports that her anxiety is a little bit better since she is distracted with work  Her  remains incarcerated  She has a son and daughter in the area  She experiences occasional palpitations and pounding of the heart, would only occur when an anxiety attack would occur  Denies any chest tightness or heaviness, no shortness of breath or wheezing  She reports that she would sometimes forgets to eat or drink, would feel very tired and slightly dizzy sometimes at the end of the day  The following portions of the patient's history were reviewed and updated as appropriate: allergies, current medications, past medical history, past social history and problem list     Review of Systems   Constitutional: Negative for appetite change and fatigue  HENT: Negative for congestion, ear pain and postnasal drip  Eyes: Negative for visual disturbance  Respiratory: Negative for cough and shortness of breath  Cardiovascular: Negative for chest pain and leg swelling  Gastrointestinal: Negative for abdominal pain, constipation and diarrhea  Genitourinary: Negative for dysuria, frequency and urgency  Musculoskeletal: Negative for arthralgias and myalgias  Skin: Negative for rash and wound  Neurological: Negative for dizziness, numbness and headaches  Hematological: Does not bruise/bleed easily  Psychiatric/Behavioral: Negative for confusion  The patient is not nervous/anxious  Objective:      /78   Pulse 68   Temp 98 1 °F (36 7 °C) (Oral)   Resp 18   Ht 5' 2" (1 575 m)   Wt 64 8 kg (142 lb 12 8 oz)   SpO2 98%   BMI 26 12 kg/m²          Physical Exam   Constitutional: She is oriented to person, place, and time  She appears well-developed and well-nourished  HENT:   Head: Normocephalic and atraumatic  Nose: Nose normal    Eyes: Pupils are equal, round, and reactive to light  Conjunctivae are normal    Neck: Neck supple  Cardiovascular: Normal rate, regular rhythm and normal heart sounds  No edema  Pulmonary/Chest: Effort normal and breath sounds normal  She has no wheezes  She has no rales  Abdominal: Soft  Bowel sounds are normal    Neurological: She is alert and oriented to person, place, and time  Skin: Skin is warm  No rash noted  Psychiatric: She has a normal mood and affect  Her behavior is normal    Nursing note and vitals reviewed  Lab results reviewed with patient

## 2018-11-14 NOTE — ASSESSMENT & PLAN NOTE
Maintain on sertraline 200 mg, trazodone 100 mg daily  Instructed to take Seroquel every night, 25 mg  May increase dose as needed  Instructed to take alprazolam 1/2 tablet at a time, explained may be able to decrease dose and frequency when feeling better  She has not seen psychiatry, stopped seeing the counselor  PDMP reviewed

## 2018-11-16 ENCOUNTER — TELEPHONE (OUTPATIENT)
Dept: INTERNAL MEDICINE CLINIC | Facility: CLINIC | Age: 58
End: 2018-11-16

## 2018-11-16 NOTE — TELEPHONE ENCOUNTER
----- Message from Mathew Castro MD sent at 11/15/2018  4:28 PM EST -----  Labs all ok including thyroid

## 2019-03-19 ENCOUNTER — TELEPHONE (OUTPATIENT)
Dept: INTERNAL MEDICINE CLINIC | Facility: CLINIC | Age: 59
End: 2019-03-19

## 2019-03-19 NOTE — TELEPHONE ENCOUNTER
Patient notified and will call first thing in the morning  Was taking medications as need but then ran out and stopped taking them   Last time she took Xanax was about 2 5 weeks ago

## 2019-03-19 NOTE — TELEPHONE ENCOUNTER
Patient states she does not feel like she is going to pass out  Has been feeling dizzy and unbalanced on and off  No headaches, not sleeping well, under a lot of stress  Yes stopped her medications

## 2019-03-19 NOTE — TELEPHONE ENCOUNTER
Patient is under a lot of stress she states she has not been feeling well  Dizzy ( pretty consistent ), not sleeping well  No chest Pain  No SOB  No nausea or vomiting   No Headaches     Left work early today because it  was so bad  Patient was looking to come In early tomorrow she said she could be here as early as you want  She doesn't know what is going on just doesn't feel right

## 2019-03-19 NOTE — TELEPHONE ENCOUNTER
Do you feel you are about to pass out? Any headaches or dizziness? Have you been sleeping? Have you stopped taking your medications?

## 2019-03-19 NOTE — TELEPHONE ENCOUNTER
You can call tomorrow for same day appt    Did you wean off your medications or just stopped taking it? When was the last time you took Xanax?

## 2019-03-22 ENCOUNTER — TELEPHONE (OUTPATIENT)
Dept: INTERNAL MEDICINE CLINIC | Facility: CLINIC | Age: 59
End: 2019-03-22

## 2019-06-03 ENCOUNTER — TELEPHONE (OUTPATIENT)
Dept: INTERNAL MEDICINE CLINIC | Facility: CLINIC | Age: 59
End: 2019-06-03

## 2020-07-21 DIAGNOSIS — F90.2 ADHD (ATTENTION DEFICIT HYPERACTIVITY DISORDER), COMBINED TYPE: ICD-10-CM

## 2020-07-21 DIAGNOSIS — F41.9 ANXIETY: ICD-10-CM

## 2020-07-21 RX ORDER — TRAZODONE HYDROCHLORIDE 50 MG/1
50 TABLET ORAL 2 TIMES DAILY
Qty: 60 TABLET | Refills: 0 | Status: CANCELLED | OUTPATIENT
Start: 2020-07-21

## 2020-07-21 NOTE — TELEPHONE ENCOUNTER
Who has been prescribing your medications? You have an appointment on Friday, can it wait until then?

## 2020-07-24 ENCOUNTER — OFFICE VISIT (OUTPATIENT)
Dept: INTERNAL MEDICINE CLINIC | Facility: CLINIC | Age: 60
End: 2020-07-24
Payer: COMMERCIAL

## 2020-07-24 VITALS
OXYGEN SATURATION: 98 % | HEIGHT: 63 IN | TEMPERATURE: 97.8 F | DIASTOLIC BLOOD PRESSURE: 78 MMHG | BODY MASS INDEX: 33.66 KG/M2 | HEART RATE: 64 BPM | WEIGHT: 190 LBS | SYSTOLIC BLOOD PRESSURE: 120 MMHG

## 2020-07-24 DIAGNOSIS — Z13.0 SCREENING FOR DEFICIENCY ANEMIA: ICD-10-CM

## 2020-07-24 DIAGNOSIS — F90.2 ADHD (ATTENTION DEFICIT HYPERACTIVITY DISORDER), COMBINED TYPE: ICD-10-CM

## 2020-07-24 DIAGNOSIS — Z13.220 SCREENING FOR LIPID DISORDERS: ICD-10-CM

## 2020-07-24 DIAGNOSIS — E66.09 CLASS 1 OBESITY DUE TO EXCESS CALORIES WITHOUT SERIOUS COMORBIDITY WITH BODY MASS INDEX (BMI) OF 33.0 TO 33.9 IN ADULT: ICD-10-CM

## 2020-07-24 DIAGNOSIS — Z11.59 ENCOUNTER FOR HEPATITIS C SCREENING TEST FOR LOW RISK PATIENT: ICD-10-CM

## 2020-07-24 DIAGNOSIS — Z12.11 SCREENING FOR COLON CANCER: ICD-10-CM

## 2020-07-24 DIAGNOSIS — Z12.31 ENCOUNTER FOR SCREENING MAMMOGRAM FOR BREAST CANCER: ICD-10-CM

## 2020-07-24 DIAGNOSIS — F41.9 ANXIETY: Primary | ICD-10-CM

## 2020-07-24 PROBLEM — E66.811 CLASS 1 OBESITY DUE TO EXCESS CALORIES WITHOUT SERIOUS COMORBIDITY WITH BODY MASS INDEX (BMI) OF 33.0 TO 33.9 IN ADULT: Status: ACTIVE | Noted: 2020-07-24

## 2020-07-24 PROCEDURE — 3008F BODY MASS INDEX DOCD: CPT | Performed by: NURSE PRACTITIONER

## 2020-07-24 PROCEDURE — 99214 OFFICE O/P EST MOD 30 MIN: CPT | Performed by: NURSE PRACTITIONER

## 2020-07-24 RX ORDER — DEXTROAMPHETAMINE SACCHARATE, AMPHETAMINE ASPARTATE, DEXTROAMPHETAMINE SULFATE AND AMPHETAMINE SULFATE 5; 5; 5; 5 MG/1; MG/1; MG/1; MG/1
1 TABLET ORAL 3 TIMES DAILY
Qty: 90 TABLET | Refills: 0 | OUTPATIENT
Start: 2020-07-24

## 2020-07-24 RX ORDER — TRAZODONE HYDROCHLORIDE 50 MG/1
50 TABLET ORAL 2 TIMES DAILY
Qty: 60 TABLET | Refills: 0 | Status: SHIPPED | OUTPATIENT
Start: 2020-07-24 | End: 2020-08-17

## 2020-07-24 RX ORDER — ALPRAZOLAM 0.5 MG/1
0.5 TABLET ORAL DAILY PRN
Qty: 30 TABLET | Refills: 0 | Status: SHIPPED | OUTPATIENT
Start: 2020-07-24 | End: 2020-08-19

## 2020-07-24 RX ORDER — ALPRAZOLAM 2 MG/1
TABLET ORAL
Qty: 60 TABLET | Refills: 1 | OUTPATIENT
Start: 2020-07-24

## 2020-07-24 RX ORDER — DEXTROAMPHETAMINE SACCHARATE, AMPHETAMINE ASPARTATE, DEXTROAMPHETAMINE SULFATE AND AMPHETAMINE SULFATE 1.25; 1.25; 1.25; 1.25 MG/1; MG/1; MG/1; MG/1
5 TABLET ORAL 2 TIMES DAILY
Qty: 60 TABLET | Refills: 0 | Status: SHIPPED | OUTPATIENT
Start: 2020-07-24 | End: 2020-08-19

## 2020-07-24 NOTE — ASSESSMENT & PLAN NOTE
BMI Counseling: Body mass index is 33 66 kg/m²  The BMI is above normal  Nutrition recommendations include reducing portion sizes, decreasing overall calorie intake, consuming healthier snacks, decreasing soda and/or juice intake and moderation in carbohydrate intake

## 2020-07-24 NOTE — ASSESSMENT & PLAN NOTE
Start trazodone 50 mg daily for 3 days then 100 mg daily  Reviewed side effects  Xanax daily PRN  PDMP reviewed  Offered psychiatry but declined

## 2020-07-24 NOTE — PROGRESS NOTES
Assessment/Plan:    ADHD (attention deficit hyperactivity disorder), combined type  Restart adderall 5mg twice daily     Anxiety  Start trazodone 50 mg daily for 3 days then 100 mg daily  Reviewed side effects  Xanax daily PRN  PDMP reviewed  Offered psychiatry but declined  Class 1 obesity due to excess calories without serious comorbidity with body mass index (BMI) of 33 0 to 33 9 in adult  BMI Counseling: Body mass index is 33 66 kg/m²  The BMI is above normal  Nutrition recommendations include reducing portion sizes, decreasing overall calorie intake, consuming healthier snacks, decreasing soda and/or juice intake and moderation in carbohydrate intake  HM: check labs, schedule mammogram  Referral for colonoscopy provided  Encouraged her to scheduled with GYN  Diagnoses and all orders for this visit:    Anxiety  -     Comprehensive metabolic panel; Future  -     TSH, 3rd generation with Free T4 reflex; Future  -     traZODone (DESYREL) 50 mg tablet; Take 1 tablet (50 mg total) by mouth 2 (two) times a day  -     ALPRAZolam (XANAX) 0 5 mg tablet; Take 1 tablet (0 5 mg total) by mouth daily as needed for anxiety    ADHD (attention deficit hyperactivity disorder), combined type  -     amphetamine-dextroamphetamine (ADDERALL) 5 MG tablet; Take 1 tablet (5 mg total) by mouth 2 (two) times a dayMax Daily Amount: 10 mg    Encounter for screening mammogram for breast cancer  -     Mammo screening bilateral w cad; Future    Screening for colon cancer  -     Ambulatory referral to Colorectal Surgery; Future    Encounter for hepatitis C screening test for low risk patient  -     Hepatitis C antibody; Future    Screening for deficiency anemia  -     CBC and differential; Future    Screening for lipid disorders  -     Lipid Panel with Direct LDL reflex;  Future    Class 1 obesity due to excess calories without serious comorbidity with body mass index (BMI) of 33 0 to 33 9 in adult          Subjective: Patient ID: Rachana Dobson is a 56 y o  female  Here today for follow up  Chey Loving has a history of depression/anxiety/adhd  She was seeing psychiatry and on medications in the past  She lost her job, therefore was without insurance  She has been off her medications for about 18 months  She was initially doing ok and was able to manage her anxiety without medications  Recently she is having issues sleeping  Her mind is racing  She has increased anxiety and more panic attacks  She can't concentrate enough to read a book  She has had issues focusing on anything  She would like to start medication again  The following portions of the patient's history were reviewed and updated as appropriate: allergies, current medications, past family history, past medical history, past social history, past surgical history and problem list     Review of Systems   Constitutional: Negative for activity change, appetite change, fatigue and unexpected weight change  Eyes: Negative for visual disturbance  Respiratory: Negative for cough, chest tightness, shortness of breath and wheezing  Cardiovascular: Negative for chest pain, palpitations and leg swelling  Gastrointestinal: Negative for abdominal pain, blood in stool, constipation and diarrhea  Genitourinary: Negative for difficulty urinating  Musculoskeletal: Negative for arthralgias  Skin: Negative for rash  Neurological: Negative for dizziness, weakness, light-headedness and headaches  Psychiatric/Behavioral: Positive for decreased concentration, dysphoric mood and sleep disturbance  Negative for suicidal ideas  The patient is nervous/anxious  Objective:      /78   Pulse 64   Temp 97 8 °F (36 6 °C)   Ht 5' 3" (1 6 m)   Wt 86 2 kg (190 lb)   SpO2 98%   BMI 33 66 kg/m²          Physical Exam   Constitutional: She appears well-developed and well-nourished  HENT:   Head: Normocephalic and atraumatic     Eyes: Pupils are equal, round, and reactive to light  Conjunctivae are normal    Neck: No thyromegaly present  Cardiovascular: Normal rate, regular rhythm, normal heart sounds and intact distal pulses  Pulmonary/Chest: Effort normal and breath sounds normal    Abdominal: Soft  Bowel sounds are normal    Musculoskeletal: Normal range of motion  She exhibits no edema  Lymphadenopathy:     She has no cervical adenopathy  Neurological: She is alert  Skin: Skin is warm and dry  Psychiatric: She has a normal mood and affect  Her behavior is normal    Vitals reviewed  I have spent 25 minutes with Patient and family today in which greater than 50% of this time was spent in counseling/coordination of care regarding Risks and benefits of tx options, Intructions for management, Patient and family education and Importance of tx compliance

## 2020-08-16 DIAGNOSIS — F41.9 ANXIETY: ICD-10-CM

## 2020-08-17 LAB
ALBUMIN SERPL-MCNC: 4.6 G/DL (ref 3.8–4.8)
ALBUMIN/GLOB SERPL: 1.6 {RATIO} (ref 1.2–2.2)
ALP SERPL-CCNC: 61 IU/L (ref 39–117)
ALT SERPL-CCNC: 14 IU/L (ref 0–32)
AST SERPL-CCNC: 20 IU/L (ref 0–40)
BASOPHILS # BLD AUTO: 0 X10E3/UL (ref 0–0.2)
BASOPHILS NFR BLD AUTO: 0 %
BILIRUB SERPL-MCNC: 0.4 MG/DL (ref 0–1.2)
BUN SERPL-MCNC: 13 MG/DL (ref 8–27)
BUN/CREAT SERPL: 14 (ref 12–28)
CALCIUM SERPL-MCNC: 9.7 MG/DL (ref 8.7–10.3)
CHLORIDE SERPL-SCNC: 103 MMOL/L (ref 96–106)
CHOLEST SERPL-MCNC: 230 MG/DL (ref 100–199)
CO2 SERPL-SCNC: 24 MMOL/L (ref 20–29)
CREAT SERPL-MCNC: 0.92 MG/DL (ref 0.57–1)
EOSINOPHIL # BLD AUTO: 0.2 X10E3/UL (ref 0–0.4)
EOSINOPHIL NFR BLD AUTO: 3 %
ERYTHROCYTE [DISTWIDTH] IN BLOOD BY AUTOMATED COUNT: 13.7 % (ref 11.7–15.4)
GLOBULIN SER-MCNC: 2.8 G/DL (ref 1.5–4.5)
GLUCOSE SERPL-MCNC: 91 MG/DL (ref 65–99)
HCT VFR BLD AUTO: 47.7 % (ref 34–46.6)
HCV AB S/CO SERPL IA: <0.1 S/CO RATIO (ref 0–0.9)
HDLC SERPL-MCNC: 55 MG/DL
HGB BLD-MCNC: 15.6 G/DL (ref 11.1–15.9)
IMM GRANULOCYTES # BLD: 0 X10E3/UL (ref 0–0.1)
IMM GRANULOCYTES NFR BLD: 0 %
LDLC SERPL CALC-MCNC: 155 MG/DL (ref 0–99)
LYMPHOCYTES # BLD AUTO: 2.8 X10E3/UL (ref 0.7–3.1)
LYMPHOCYTES NFR BLD AUTO: 35 %
MCH RBC QN AUTO: 27.8 PG (ref 26.6–33)
MCHC RBC AUTO-ENTMCNC: 32.7 G/DL (ref 31.5–35.7)
MCV RBC AUTO: 85 FL (ref 79–97)
MONOCYTES # BLD AUTO: 0.5 X10E3/UL (ref 0.1–0.9)
MONOCYTES NFR BLD AUTO: 6 %
NEUTROPHILS # BLD AUTO: 4.3 X10E3/UL (ref 1.4–7)
NEUTROPHILS NFR BLD AUTO: 56 %
PLATELET # BLD AUTO: 272 X10E3/UL (ref 150–450)
POTASSIUM SERPL-SCNC: 4.7 MMOL/L (ref 3.5–5.2)
PROT SERPL-MCNC: 7.4 G/DL (ref 6–8.5)
RBC # BLD AUTO: 5.61 X10E6/UL (ref 3.77–5.28)
SL AMB EGFR AFRICAN AMERICAN: 77 ML/MIN/1.73
SL AMB EGFR NON AFRICAN AMERICAN: 67 ML/MIN/1.73
SL AMB T4, FREE (DIRECT): 1.31 NG/DL (ref 0.82–1.77)
SODIUM SERPL-SCNC: 141 MMOL/L (ref 134–144)
TRIGL SERPL-MCNC: 102 MG/DL (ref 0–149)
TSH SERPL DL<=0.005 MIU/L-ACNC: 13.2 UIU/ML (ref 0.45–4.5)
WBC # BLD AUTO: 7.9 X10E3/UL (ref 3.4–10.8)

## 2020-08-17 RX ORDER — TRAZODONE HYDROCHLORIDE 50 MG/1
TABLET ORAL
Qty: 60 TABLET | Refills: 0 | Status: SHIPPED | OUTPATIENT
Start: 2020-08-17 | End: 2020-08-19 | Stop reason: SDUPTHER

## 2020-08-19 ENCOUNTER — OFFICE VISIT (OUTPATIENT)
Dept: INTERNAL MEDICINE CLINIC | Facility: CLINIC | Age: 60
End: 2020-08-19
Payer: COMMERCIAL

## 2020-08-19 VITALS
BODY MASS INDEX: 33.56 KG/M2 | HEIGHT: 63 IN | WEIGHT: 189.4 LBS | TEMPERATURE: 97.6 F | RESPIRATION RATE: 18 BRPM | HEART RATE: 79 BPM | SYSTOLIC BLOOD PRESSURE: 110 MMHG | DIASTOLIC BLOOD PRESSURE: 76 MMHG | OXYGEN SATURATION: 98 %

## 2020-08-19 DIAGNOSIS — R00.2 PALPITATIONS: Primary | ICD-10-CM

## 2020-08-19 DIAGNOSIS — F41.9 ANXIETY: ICD-10-CM

## 2020-08-19 DIAGNOSIS — E66.09 CLASS 1 OBESITY DUE TO EXCESS CALORIES WITHOUT SERIOUS COMORBIDITY WITH BODY MASS INDEX (BMI) OF 33.0 TO 33.9 IN ADULT: ICD-10-CM

## 2020-08-19 DIAGNOSIS — R60.0 BILATERAL LEG EDEMA: ICD-10-CM

## 2020-08-19 DIAGNOSIS — F90.2 ADHD (ATTENTION DEFICIT HYPERACTIVITY DISORDER), COMBINED TYPE: ICD-10-CM

## 2020-08-19 DIAGNOSIS — Z72.0 TOBACCO ABUSE: ICD-10-CM

## 2020-08-19 DIAGNOSIS — E03.9 ACQUIRED HYPOTHYROIDISM: ICD-10-CM

## 2020-08-19 DIAGNOSIS — E78.00 PURE HYPERCHOLESTEROLEMIA: ICD-10-CM

## 2020-08-19 DIAGNOSIS — Z71.9 HEALTH COUNSELING: ICD-10-CM

## 2020-08-19 DIAGNOSIS — Z12.11 SCREENING FOR COLON CANCER: ICD-10-CM

## 2020-08-19 PROBLEM — G47.00 INSOMNIA: Status: ACTIVE | Noted: 2020-08-19

## 2020-08-19 PROBLEM — E66.3 OVERWEIGHT: Status: RESOLVED | Noted: 2018-03-12 | Resolved: 2020-08-19

## 2020-08-19 PROCEDURE — 3008F BODY MASS INDEX DOCD: CPT | Performed by: INTERNAL MEDICINE

## 2020-08-19 PROCEDURE — 99214 OFFICE O/P EST MOD 30 MIN: CPT | Performed by: INTERNAL MEDICINE

## 2020-08-19 RX ORDER — ALPRAZOLAM 0.5 MG/1
TABLET ORAL
Qty: 30 TABLET | Refills: 0 | Status: SHIPPED | OUTPATIENT
Start: 2020-08-19 | End: 2020-08-24 | Stop reason: SDUPTHER

## 2020-08-19 RX ORDER — LEVOTHYROXINE SODIUM 0.03 MG/1
25 TABLET ORAL DAILY
Qty: 90 TABLET | Refills: 0 | Status: SHIPPED | OUTPATIENT
Start: 2020-08-19 | End: 2020-10-14 | Stop reason: SDUPTHER

## 2020-08-19 RX ORDER — DEXTROAMPHETAMINE SACCHARATE, AMPHETAMINE ASPARTATE, DEXTROAMPHETAMINE SULFATE AND AMPHETAMINE SULFATE 1.25; 1.25; 1.25; 1.25 MG/1; MG/1; MG/1; MG/1
TABLET ORAL
Qty: 60 TABLET | Refills: 0 | Status: SHIPPED | OUTPATIENT
Start: 2020-08-19 | End: 2020-10-02

## 2020-08-19 RX ORDER — TRAZODONE HYDROCHLORIDE 100 MG/1
100 TABLET ORAL
Qty: 90 TABLET | Refills: 1 | Status: SHIPPED | OUTPATIENT
Start: 2020-08-19 | End: 2020-10-02 | Stop reason: SDUPTHER

## 2020-08-19 NOTE — ASSESSMENT & PLAN NOTE
Agrees to start medication, take on an empty stomach in AM  Recheck TFTs in 6 weeks  Discussed symptoms which may improve with treatment

## 2020-08-19 NOTE — PATIENT INSTRUCTIONS

## 2020-08-19 NOTE — ASSESSMENT & PLAN NOTE
Gained over 40 lbs since 2 years ago  Started keto diet recently  Recommend regular aerobic exercise

## 2020-08-19 NOTE — PROGRESS NOTES
Assessment/Plan:    Acquired hypothyroidism  Agrees to start medication, take on an empty stomach in AM  Recheck TFTs in 6 weeks  Discussed symptoms which may improve with treatment  ADHD (attention deficit hyperactivity disorder), combined type  May improve if hypothyroid treated  No medication change, maintain on low dose Adderall for now  Class 1 obesity due to excess calories without serious comorbidity with body mass index (BMI) of 33 0 to 33 9 in adult  Gained over 40 lbs since 2 years ago  Started keto diet recently  Recommend regular aerobic exercise  Insomnia  Continue trazodone 100 mg qHS  Osteopenia  Bone density due  Anxiety  Consider restarting sertraline, previously on quetiapine  Pure hypercholesterolemia  Discussed low fat diet  Repeat lipids in a few months, briefly discussed medication  Tobacco abuse  Briefly discussed smoking cessation  Monitor CBC  Diagnoses and all orders for this visit:    Palpitations  Comments:  Suspect due to anxiety  Check EKG  Orders:  -     ECG 12 lead; Future  -     Echo complete with contrast if indicated; Future    Bilateral leg edema  Comments:  May be due to weight gain  Check echo  Orders:  -     Echo complete with contrast if indicated; Future    Acquired hypothyroidism  -     levothyroxine 25 mcg tablet; Take 1 tablet (25 mcg total) by mouth daily  -     TSH, 3rd generation with Free T4 reflex; Future    Anxiety  -     traZODone (DESYREL) 100 mg tablet; Take 1 tablet (100 mg total) by mouth daily at bedtime    ADHD (attention deficit hyperactivity disorder), combined type    Class 1 obesity due to excess calories without serious comorbidity with body mass index (BMI) of 33 0 to 33 9 in adult    Screening for colon cancer  -     Ambulatory referral for colonoscopy; Future    Pure hypercholesterolemia    Tobacco abuse    Health counseling  Comments:  Mammogram due  Pneumovax next visit        Follow up in 2 months or as needed  Subjective:      Patient ID: Anju Bolden is a 56 y o  female  Mrs Constanza Álvarez is here with her   [place] doing all right  She stopped all her medications over a year ago, did not think she went through withdrawal side effects  She was working at that time and was able to keep herself busy  Since her  returned home, she has been feeling more anxious since she has stopped working  She is taking trazodone every night, still having issues with sleep  She reports the Adderall helps her focus and concentrate during the day  She complains of intermittent palpitations, notices it at night when in bed  Since this would last for a few seconds at a time only  She denies any chest pain, no shortness of breath  She does note more frequent bilateral leg swelling  She reports gaining a lot of weight over the past year  The following portions of the patient's history were reviewed and updated as appropriate: allergies, current medications, past medical history, past social history and problem list     Review of Systems   Constitutional: Negative for activity change, appetite change and fatigue  Eyes: Negative for visual disturbance  Respiratory: Negative for cough, chest tightness, shortness of breath and wheezing  Cardiovascular: Positive for palpitations and leg swelling  Negative for chest pain  Gastrointestinal: Negative for abdominal pain, constipation and diarrhea  Genitourinary: Negative for dysuria, frequency and urgency  Musculoskeletal: Negative for arthralgias  Skin: Negative for rash  Neurological: Negative for dizziness and headaches  Psychiatric/Behavioral: Positive for decreased concentration and sleep disturbance  Negative for agitation, behavioral problems, confusion and suicidal ideas  The patient is not nervous/anxious            Objective:      /76   Pulse 79   Temp 97 6 °F (36 4 °C)   Resp 18   Ht 5' 3" (1 6 m)   Wt 85 9 kg (189 lb 6 4 oz)   SpO2 98%   BMI 33 55 kg/m²          Physical Exam  Vitals signs and nursing note reviewed  Constitutional:       General: She is not in acute distress  Appearance: She is well-developed  HENT:      Head: Normocephalic and atraumatic  Eyes:      Pupils: Pupils are equal, round, and reactive to light  Cardiovascular:      Rate and Rhythm: Normal rate and regular rhythm  Heart sounds: Normal heart sounds  Pulmonary:      Effort: Pulmonary effort is normal       Breath sounds: Normal breath sounds  No wheezing  Abdominal:      General: Bowel sounds are normal       Palpations: Abdomen is soft  Musculoskeletal:         General: Swelling present  Right lower le+ Edema present  Left lower le+ Edema present  Skin:     General: Skin is warm  Findings: No rash  Neurological:      Mental Status: She is alert and oriented to person, place, and time  Psychiatric:         Behavior: Behavior normal            Lab results reviewed with patient

## 2020-08-24 ENCOUNTER — DOCUMENTATION (OUTPATIENT)
Dept: INTERNAL MEDICINE CLINIC | Facility: CLINIC | Age: 60
End: 2020-08-24

## 2020-08-24 DIAGNOSIS — F41.9 ANXIETY: ICD-10-CM

## 2020-08-24 RX ORDER — ALPRAZOLAM 1 MG/1
TABLET ORAL
Qty: 60 TABLET | Refills: 0 | Status: SHIPPED | OUTPATIENT
Start: 2020-08-24 | End: 2020-10-02 | Stop reason: ALTCHOICE

## 2020-08-24 NOTE — PROGRESS NOTES
reports wife's anxiety is not good  He is requesting another medication or to increase his Xanax and Adderall  She was previously on sertraline 200 mg daily, all was also on Seroquel 7500 mg daily  Explained I would rather not adjust the Adderall, may continue taking this as needed  Increased alprazolam to 1 mg b i d  P r n  Restart sertraline, 50 mg daily

## 2020-08-25 ENCOUNTER — TELEPHONE (OUTPATIENT)
Dept: GASTROENTEROLOGY | Facility: AMBULARY SURGERY CENTER | Age: 60
End: 2020-08-25

## 2020-08-25 NOTE — TELEPHONE ENCOUNTER
Called pt for OA screening, pt failed due to symptoms she has  She stated she has diarrhea, abdominal pain at times and constipation  She believes she was diagnosed with IBS in the past  Has had colonoscopy in the past with hx of polyps  Pt is scheduled for a consult with Dr Xavier Rodriguez on 9/25 at 1pm  Mailed appointment card

## 2020-09-02 ENCOUNTER — TELEPHONE (OUTPATIENT)
Dept: INTERNAL MEDICINE CLINIC | Facility: CLINIC | Age: 60
End: 2020-09-02

## 2020-09-02 DIAGNOSIS — F41.9 ANXIETY: ICD-10-CM

## 2020-09-02 DIAGNOSIS — F39 MOOD DISORDER (HCC): Primary | ICD-10-CM

## 2020-09-02 DIAGNOSIS — F90.2 ADHD (ATTENTION DEFICIT HYPERACTIVITY DISORDER), COMBINED TYPE: ICD-10-CM

## 2020-09-02 NOTE — TELEPHONE ENCOUNTER
Do not adjust your thyroid medication  Please continue to take a daily, first thing in the morning on an empty stomach  You will not notice the difference right away for thyroid symptoms  If you stopped your Zoloft, have you been taking her trazodone every night instead? Would like to see Psychiatry or seek counseling again?

## 2020-09-02 NOTE — TELEPHONE ENCOUNTER
Patient notified     States she is taking the trazodone every night      She would like a referral for a Psychiatrist to start the process , she is still in [place] but is making her way back to [place]

## 2020-09-02 NOTE — TELEPHONE ENCOUNTER
Patient has questions regarding medications:    States she stopped the Zoloft because she does not feel good on it and having nightmares  Patient states she has been on Levothyroxine for about 2 weeks and does not fell like it is helping her  She is barely eating and still gaining weight    She is not moving bowels very much- She is trying to see Rose Marie Calix sooner because her colitis os acting up     Patient is very frustrated with her body right now     Wants to know if she can double the does of her Levothyroxine

## 2020-09-21 ENCOUNTER — TELEPHONE (OUTPATIENT)
Dept: PSYCHIATRY | Facility: CLINIC | Age: 60
End: 2020-09-21

## 2020-10-01 ENCOUNTER — TELEPHONE (OUTPATIENT)
Dept: PSYCHIATRY | Facility: CLINIC | Age: 60
End: 2020-10-01

## 2020-10-02 ENCOUNTER — OFFICE VISIT (OUTPATIENT)
Dept: PSYCHIATRY | Facility: CLINIC | Age: 60
End: 2020-10-02
Payer: COMMERCIAL

## 2020-10-02 DIAGNOSIS — F41.1 GENERALIZED ANXIETY DISORDER WITH PANIC ATTACKS: Primary | ICD-10-CM

## 2020-10-02 DIAGNOSIS — F33.1 MAJOR DEPRESSIVE DISORDER, RECURRENT EPISODE, MODERATE (HCC): ICD-10-CM

## 2020-10-02 DIAGNOSIS — F41.0 GENERALIZED ANXIETY DISORDER WITH PANIC ATTACKS: Primary | ICD-10-CM

## 2020-10-02 DIAGNOSIS — F41.9 ANXIETY: ICD-10-CM

## 2020-10-02 DIAGNOSIS — F90.2 ADHD (ATTENTION DEFICIT HYPERACTIVITY DISORDER), COMBINED TYPE: ICD-10-CM

## 2020-10-02 DIAGNOSIS — F51.02 ADJUSTMENT INSOMNIA: ICD-10-CM

## 2020-10-02 PROCEDURE — 90791 PSYCH DIAGNOSTIC EVALUATION: CPT | Performed by: STUDENT IN AN ORGANIZED HEALTH CARE EDUCATION/TRAINING PROGRAM

## 2020-10-02 RX ORDER — DESVENLAFAXINE 50 MG/1
50 TABLET, EXTENDED RELEASE ORAL DAILY
Qty: 30 TABLET | Refills: 1 | Status: SHIPPED | OUTPATIENT
Start: 2020-10-02 | End: 2020-12-01

## 2020-10-02 RX ORDER — TRAZODONE HYDROCHLORIDE 50 MG/1
50 TABLET ORAL
Qty: 30 TABLET | Refills: 1 | Status: SHIPPED | OUTPATIENT
Start: 2020-10-02 | End: 2020-12-01 | Stop reason: SDUPTHER

## 2020-10-02 RX ORDER — DEXTROAMPHETAMINE SACCHARATE, AMPHETAMINE ASPARTATE, DEXTROAMPHETAMINE SULFATE AND AMPHETAMINE SULFATE 1.25; 1.25; 1.25; 1.25 MG/1; MG/1; MG/1; MG/1
5 TABLET ORAL 2 TIMES DAILY
Qty: 60 TABLET | Refills: 0 | Status: SHIPPED | OUTPATIENT
Start: 2020-10-02 | End: 2020-12-28

## 2020-10-13 ENCOUNTER — CLINICAL SUPPORT (OUTPATIENT)
Dept: INTERNAL MEDICINE CLINIC | Facility: CLINIC | Age: 60
End: 2020-10-13
Payer: COMMERCIAL

## 2020-10-13 DIAGNOSIS — Z23 NEED FOR INFLUENZA VACCINATION: Primary | ICD-10-CM

## 2020-10-13 PROCEDURE — 90471 IMMUNIZATION ADMIN: CPT | Performed by: INTERNAL MEDICINE

## 2020-10-13 PROCEDURE — 90682 RIV4 VACC RECOMBINANT DNA IM: CPT | Performed by: INTERNAL MEDICINE

## 2020-10-14 ENCOUNTER — TELEPHONE (OUTPATIENT)
Dept: INTERNAL MEDICINE CLINIC | Facility: CLINIC | Age: 60
End: 2020-10-14

## 2020-10-14 DIAGNOSIS — E03.9 ACQUIRED HYPOTHYROIDISM: Primary | ICD-10-CM

## 2020-10-14 LAB
SL AMB T4, FREE (DIRECT): 1.38 NG/DL (ref 0.82–1.77)
TSH SERPL DL<=0.005 MIU/L-ACNC: 5.41 UIU/ML (ref 0.45–4.5)

## 2020-10-14 RX ORDER — LEVOTHYROXINE SODIUM 0.03 MG/1
TABLET ORAL
Qty: 100 TABLET | Refills: 1 | Status: SHIPPED | OUTPATIENT
Start: 2020-10-14 | End: 2021-02-11 | Stop reason: DRUGHIGH

## 2020-10-30 ENCOUNTER — TELEMEDICINE (OUTPATIENT)
Dept: INTERNAL MEDICINE CLINIC | Facility: CLINIC | Age: 60
End: 2020-10-30
Payer: COMMERCIAL

## 2020-10-30 DIAGNOSIS — F41.0 GENERALIZED ANXIETY DISORDER WITH PANIC ATTACKS: ICD-10-CM

## 2020-10-30 DIAGNOSIS — F33.1 MAJOR DEPRESSIVE DISORDER, RECURRENT EPISODE, MODERATE (HCC): ICD-10-CM

## 2020-10-30 DIAGNOSIS — Z72.0 TOBACCO ABUSE: ICD-10-CM

## 2020-10-30 DIAGNOSIS — E78.00 PURE HYPERCHOLESTEROLEMIA: ICD-10-CM

## 2020-10-30 DIAGNOSIS — E66.09 CLASS 1 OBESITY DUE TO EXCESS CALORIES WITHOUT SERIOUS COMORBIDITY WITH BODY MASS INDEX (BMI) OF 33.0 TO 33.9 IN ADULT: ICD-10-CM

## 2020-10-30 DIAGNOSIS — E03.9 ACQUIRED HYPOTHYROIDISM: ICD-10-CM

## 2020-10-30 DIAGNOSIS — F41.1 GENERALIZED ANXIETY DISORDER WITH PANIC ATTACKS: ICD-10-CM

## 2020-10-30 DIAGNOSIS — F90.2 ADHD (ATTENTION DEFICIT HYPERACTIVITY DISORDER), COMBINED TYPE: ICD-10-CM

## 2020-10-30 DIAGNOSIS — Z12.11 SCREENING FOR COLON CANCER: ICD-10-CM

## 2020-10-30 DIAGNOSIS — M54.2 NECK DISCOMFORT: ICD-10-CM

## 2020-10-30 DIAGNOSIS — M85.80 OSTEOPENIA, UNSPECIFIED LOCATION: ICD-10-CM

## 2020-10-30 DIAGNOSIS — F51.02 ADJUSTMENT INSOMNIA: ICD-10-CM

## 2020-10-30 DIAGNOSIS — R00.2 PALPITATIONS: ICD-10-CM

## 2020-10-30 DIAGNOSIS — F41.9 ANXIETY: ICD-10-CM

## 2020-10-30 DIAGNOSIS — R10.13 DYSPEPSIA: Primary | ICD-10-CM

## 2020-10-30 PROCEDURE — 99214 OFFICE O/P EST MOD 30 MIN: CPT | Performed by: INTERNAL MEDICINE

## 2020-10-30 RX ORDER — ALPRAZOLAM 1 MG/1
TABLET ORAL
Qty: 60 TABLET | Refills: 0 | Status: SHIPPED | OUTPATIENT
Start: 2020-10-30 | End: 2021-01-05 | Stop reason: SDUPTHER

## 2020-10-30 RX ORDER — ALPRAZOLAM 1 MG/1
TABLET ORAL
Qty: 60 TABLET | Refills: 0
Start: 2020-10-30 | End: 2020-10-30 | Stop reason: SDUPTHER

## 2020-11-03 ENCOUNTER — CONSULT (OUTPATIENT)
Dept: GASTROENTEROLOGY | Facility: AMBULARY SURGERY CENTER | Age: 60
End: 2020-11-03
Payer: COMMERCIAL

## 2020-11-03 VITALS — BODY MASS INDEX: 34.66 KG/M2 | WEIGHT: 195.6 LBS | HEIGHT: 63 IN | TEMPERATURE: 97.2 F

## 2020-11-03 DIAGNOSIS — Z12.11 SCREENING FOR COLON CANCER: ICD-10-CM

## 2020-11-03 PROCEDURE — 99204 OFFICE O/P NEW MOD 45 MIN: CPT | Performed by: PHYSICIAN ASSISTANT

## 2020-11-03 PROCEDURE — 3008F BODY MASS INDEX DOCD: CPT | Performed by: PHYSICIAN ASSISTANT

## 2020-11-25 DIAGNOSIS — F41.1 GENERALIZED ANXIETY DISORDER WITH PANIC ATTACKS: ICD-10-CM

## 2020-11-25 DIAGNOSIS — F41.0 GENERALIZED ANXIETY DISORDER WITH PANIC ATTACKS: ICD-10-CM

## 2020-11-25 RX ORDER — DESVENLAFAXINE 50 MG/1
TABLET, EXTENDED RELEASE ORAL
Qty: 30 TABLET | Refills: 1 | OUTPATIENT
Start: 2020-11-25

## 2020-11-30 ENCOUNTER — HOSPITAL ENCOUNTER (OUTPATIENT)
Dept: ULTRASOUND IMAGING | Facility: HOSPITAL | Age: 60
Discharge: HOME | End: 2020-11-30
Payer: COMMERCIAL

## 2020-11-30 DIAGNOSIS — E03.9 ACQUIRED HYPOTHYROIDISM: ICD-10-CM

## 2020-11-30 DIAGNOSIS — M54.2 NECK DISCOMFORT: ICD-10-CM

## 2020-11-30 LAB
THYROGLOB AB SERPL-ACNC: <1 IU/ML (ref 0–0.9)
THYROPEROXIDASE AB SERPL-ACNC: 261 IU/ML (ref 0–34)

## 2020-11-30 PROCEDURE — 76536 US EXAM OF HEAD AND NECK: CPT

## 2020-12-01 ENCOUNTER — OFFICE VISIT (OUTPATIENT)
Dept: PSYCHIATRY | Facility: CLINIC | Age: 60
End: 2020-12-01
Payer: COMMERCIAL

## 2020-12-01 ENCOUNTER — TELEPHONE (OUTPATIENT)
Dept: INTERNAL MEDICINE CLINIC | Facility: CLINIC | Age: 60
End: 2020-12-01

## 2020-12-01 DIAGNOSIS — F41.9 ANXIETY: ICD-10-CM

## 2020-12-01 DIAGNOSIS — F90.2 ADHD (ATTENTION DEFICIT HYPERACTIVITY DISORDER), COMBINED TYPE: ICD-10-CM

## 2020-12-01 DIAGNOSIS — F41.1 GENERALIZED ANXIETY DISORDER WITH PANIC ATTACKS: ICD-10-CM

## 2020-12-01 DIAGNOSIS — F41.0 GENERALIZED ANXIETY DISORDER WITH PANIC ATTACKS: ICD-10-CM

## 2020-12-01 DIAGNOSIS — F33.1 MAJOR DEPRESSIVE DISORDER, RECURRENT EPISODE, MODERATE (HCC): Primary | ICD-10-CM

## 2020-12-01 PROCEDURE — 99213 OFFICE O/P EST LOW 20 MIN: CPT | Performed by: STUDENT IN AN ORGANIZED HEALTH CARE EDUCATION/TRAINING PROGRAM

## 2020-12-01 RX ORDER — TRAZODONE HYDROCHLORIDE 50 MG/1
50 TABLET ORAL
Qty: 30 TABLET | Refills: 1 | Status: SHIPPED | OUTPATIENT
Start: 2020-12-01 | End: 2021-02-28

## 2020-12-01 RX ORDER — DEXTROAMPHETAMINE SACCHARATE, AMPHETAMINE ASPARTATE MONOHYDRATE, DEXTROAMPHETAMINE SULFATE AND AMPHETAMINE SULFATE 2.5; 2.5; 2.5; 2.5 MG/1; MG/1; MG/1; MG/1
10 CAPSULE, EXTENDED RELEASE ORAL EVERY MORNING
Qty: 30 CAPSULE | Refills: 0 | Status: SHIPPED | OUTPATIENT
Start: 2020-12-01 | End: 2020-12-28

## 2020-12-01 RX ORDER — DESVENLAFAXINE 100 MG/1
100 TABLET, EXTENDED RELEASE ORAL DAILY
Qty: 30 TABLET | Refills: 2 | Status: SHIPPED | OUTPATIENT
Start: 2020-12-01 | End: 2021-03-12 | Stop reason: SDUPTHER

## 2020-12-03 ENCOUNTER — TELEPHONE (OUTPATIENT)
Dept: INTERNAL MEDICINE CLINIC | Facility: CLINIC | Age: 60
End: 2020-12-03

## 2020-12-03 DIAGNOSIS — E03.9 ACQUIRED HYPOTHYROIDISM: Primary | ICD-10-CM

## 2020-12-03 LAB — TSH SERPL DL<=0.005 MIU/L-ACNC: 3.76 UIU/ML (ref 0.45–4.5)

## 2020-12-08 ENCOUNTER — TELEPHONE (OUTPATIENT)
Dept: INTERNAL MEDICINE CLINIC | Facility: CLINIC | Age: 60
End: 2020-12-08

## 2020-12-21 ENCOUNTER — ANESTHESIA EVENT (OUTPATIENT)
Dept: GASTROENTEROLOGY | Facility: AMBULARY SURGERY CENTER | Age: 60
End: 2020-12-21

## 2020-12-21 ENCOUNTER — TRANSCRIBE ORDERS (OUTPATIENT)
Dept: GASTROENTEROLOGY | Facility: CLINIC | Age: 60
End: 2020-12-21

## 2020-12-28 ENCOUNTER — TELEPHONE (OUTPATIENT)
Dept: INTERNAL MEDICINE CLINIC | Facility: CLINIC | Age: 60
End: 2020-12-28

## 2020-12-28 ENCOUNTER — HOSPITAL ENCOUNTER (OUTPATIENT)
Dept: NON INVASIVE DIAGNOSTICS | Facility: CLINIC | Age: 60
Discharge: HOME | End: 2020-12-28
Payer: COMMERCIAL

## 2020-12-28 ENCOUNTER — TELEPHONE (OUTPATIENT)
Dept: PSYCHIATRY | Facility: CLINIC | Age: 60
End: 2020-12-28

## 2020-12-28 DIAGNOSIS — F90.2 ADHD (ATTENTION DEFICIT HYPERACTIVITY DISORDER), COMBINED TYPE: Primary | ICD-10-CM

## 2020-12-28 DIAGNOSIS — R00.2 PALPITATIONS: ICD-10-CM

## 2020-12-28 DIAGNOSIS — R60.0 BILATERAL LEG EDEMA: ICD-10-CM

## 2020-12-28 PROCEDURE — 93306 TTE W/DOPPLER COMPLETE: CPT | Performed by: INTERNAL MEDICINE

## 2020-12-28 PROCEDURE — 93306 TTE W/DOPPLER COMPLETE: CPT

## 2020-12-28 RX ORDER — DEXTROAMPHETAMINE SACCHARATE, AMPHETAMINE ASPARTATE MONOHYDRATE, DEXTROAMPHETAMINE SULFATE AND AMPHETAMINE SULFATE 5; 5; 5; 5 MG/1; MG/1; MG/1; MG/1
20 CAPSULE, EXTENDED RELEASE ORAL EVERY MORNING
Qty: 30 CAPSULE | Refills: 0 | Status: SHIPPED | OUTPATIENT
Start: 2020-12-28 | End: 2021-02-12 | Stop reason: SDUPTHER

## 2020-12-29 DIAGNOSIS — Z86.010 HISTORY OF COLON POLYPS: Primary | ICD-10-CM

## 2021-01-04 ENCOUNTER — ANESTHESIA (OUTPATIENT)
Dept: GASTROENTEROLOGY | Facility: AMBULARY SURGERY CENTER | Age: 61
End: 2021-01-04

## 2021-01-04 ENCOUNTER — HOSPITAL ENCOUNTER (OUTPATIENT)
Dept: GASTROENTEROLOGY | Facility: AMBULARY SURGERY CENTER | Age: 61
Setting detail: OUTPATIENT SURGERY
Discharge: HOME | End: 2021-01-04
Attending: INTERNAL MEDICINE
Payer: COMMERCIAL

## 2021-01-04 VITALS
OXYGEN SATURATION: 98 % | BODY MASS INDEX: 32.78 KG/M2 | SYSTOLIC BLOOD PRESSURE: 149 MMHG | HEIGHT: 63 IN | TEMPERATURE: 97.1 F | RESPIRATION RATE: 22 BRPM | WEIGHT: 185 LBS | HEART RATE: 69 BPM | DIASTOLIC BLOOD PRESSURE: 74 MMHG

## 2021-01-04 VITALS — HEART RATE: 73 BPM

## 2021-01-04 DIAGNOSIS — Z12.11 SCREENING FOR COLON CANCER: ICD-10-CM

## 2021-01-04 DIAGNOSIS — K59.00 CONSTIPATION, UNSPECIFIED CONSTIPATION TYPE: Primary | ICD-10-CM

## 2021-01-04 PROBLEM — F17.200 SMOKING: Status: ACTIVE | Noted: 2020-08-19

## 2021-01-04 PROBLEM — IMO0001 SMOKING: Status: ACTIVE | Noted: 2020-08-19

## 2021-01-04 PROCEDURE — 88305 TISSUE EXAM BY PATHOLOGIST: CPT | Performed by: PATHOLOGY

## 2021-01-04 PROCEDURE — 45385 COLONOSCOPY W/LESION REMOVAL: CPT | Performed by: INTERNAL MEDICINE

## 2021-01-04 PROCEDURE — 45380 COLONOSCOPY AND BIOPSY: CPT | Performed by: INTERNAL MEDICINE

## 2021-01-04 RX ORDER — PROPOFOL 10 MG/ML
INJECTION, EMULSION INTRAVENOUS AS NEEDED
Status: DISCONTINUED | OUTPATIENT
Start: 2021-01-04 | End: 2021-01-04

## 2021-01-04 RX ORDER — LIDOCAINE HYDROCHLORIDE 10 MG/ML
INJECTION, SOLUTION EPIDURAL; INFILTRATION; INTRACAUDAL; PERINEURAL AS NEEDED
Status: DISCONTINUED | OUTPATIENT
Start: 2021-01-04 | End: 2021-01-04

## 2021-01-04 RX ORDER — PROPOFOL 10 MG/ML
INJECTION, EMULSION INTRAVENOUS CONTINUOUS PRN
Status: DISCONTINUED | OUTPATIENT
Start: 2021-01-04 | End: 2021-01-04

## 2021-01-04 RX ORDER — SODIUM CHLORIDE, SODIUM LACTATE, POTASSIUM CHLORIDE, CALCIUM CHLORIDE 600; 310; 30; 20 MG/100ML; MG/100ML; MG/100ML; MG/100ML
100 INJECTION, SOLUTION INTRAVENOUS CONTINUOUS
Status: DISCONTINUED | OUTPATIENT
Start: 2021-01-04 | End: 2021-01-08 | Stop reason: HOSPADM

## 2021-01-04 RX ORDER — LINACLOTIDE 72 UG/1
1 CAPSULE, GELATIN COATED ORAL DAILY
Qty: 30 CAPSULE | Refills: 3 | Status: SHIPPED | OUTPATIENT
Start: 2021-01-04 | End: 2021-01-14 | Stop reason: SDUPTHER

## 2021-01-04 RX ADMIN — LIDOCAINE HYDROCHLORIDE 50 MG: 10 INJECTION, SOLUTION EPIDURAL; INFILTRATION; INTRACAUDAL at 08:54

## 2021-01-04 RX ADMIN — PROPOFOL 100 MG: 10 INJECTION, EMULSION INTRAVENOUS at 08:54

## 2021-01-04 RX ADMIN — PROPOFOL 50 MG: 10 INJECTION, EMULSION INTRAVENOUS at 08:59

## 2021-01-04 RX ADMIN — SODIUM CHLORIDE, SODIUM LACTATE, POTASSIUM CHLORIDE, AND CALCIUM CHLORIDE: .6; .31; .03; .02 INJECTION, SOLUTION INTRAVENOUS at 08:15

## 2021-01-04 RX ADMIN — PROPOFOL 140 MCG/KG/MIN: 10 INJECTION, EMULSION INTRAVENOUS at 08:55

## 2021-01-04 NOTE — ANESTHESIA POSTPROCEDURE EVALUATION
Post-Op Assessment Note    CV Status:  Stable  Pain Score: 0    Pain management: adequate     Mental Status:  Alert and awake   Hydration Status:  Euvolemic   PONV Controlled:  Controlled   Airway Patency:  Patent      Post Op Vitals Reviewed: Yes      Staff: CRNA         No complications documented      BP   116/69   Temp     Pulse  72   Resp   20   SpO2   100

## 2021-01-04 NOTE — ANESTHESIA PREPROCEDURE EVALUATION
Procedure:  COLONOSCOPY    Relevant Problems   ANESTHESIA (within normal limits)      CARDIO  Recent echo showed mild aortic dilation, no valve problems, normal EF   (+) Pure hypercholesterolemia      ENDO   (+) Acquired hypothyroidism      NEURO/PSYCH   (+) Generalized anxiety disorder with panic attacks   (+) Major depressive disorder, recurrent episode, moderate (HCC)      PULMONARY   reports snoring, possible rare apneas   (+) Smoking   (-) URI (upper respiratory infection)      Other   (+) ADHD (attention deficit hyperactivity disorder), combined type   (+) Class 1 obesity due to excess calories without serious comorbidity with body mass index (BMI) of 33 0 to 33 9 in adult      Physical Exam    Airway    Mallampati score: I  TM Distance: >3 FB  Neck ROM: full     Dental   No notable dental hx     Cardiovascular      Pulmonary      Other Findings       Lab Results   Component Value Date    WBC 7 9 08/14/2020    HGB 15 6 08/14/2020     08/14/2020     Lab Results   Component Value Date    SODIUM 141 08/14/2020    K 4 7 08/14/2020    BUN 13 08/14/2020    CREATININE 0 92 08/14/2020    EGFR 72 11/14/2018    GLUCOSE 84 08/04/2014     Anesthesia Plan  ASA Score- 2     Anesthesia Type- IV sedation with anesthesia with ASA Monitors  Additional Monitors:   Airway Plan:           Plan Factors-Exercise tolerance (METS): >4 METS  Chart reviewed  Imaging results reviewed  Existing labs reviewed  Patient summary reviewed  Patient is a current smoker  Induction- intravenous  Postoperative Plan-     Informed Consent- Anesthetic plan and risks discussed with patient and spouse  I personally reviewed this patient with the CRNA  Discussed and agreed on the Anesthesia Plan with the CRNA  Dario Rowe

## 2021-01-04 NOTE — H&P
History and Physical - [facility]  Enzo Garcia 56 y o  female MRN: [id number]                  HPI: Enzo Garcia is a 56y o  year old female who presents for colonoscopy for chronic constipation and history of colon polyps  REVIEW OF SYSTEMS: Per the HPI, and otherwise unremarkable  Historical Information   Past Medical History:   Diagnosis Date    Anxiety     Anxiety     Colon polyp     Panic attacks      Past Surgical History:   Procedure Laterality Date     SECTION      COLONOSCOPY      HYSTERECTOMY       Social History   Social History     Substance and Sexual Activity   Alcohol Use Yes    Comment: Rare- 1 every few weeks      Social History     Substance and Sexual Activity   Drug Use No    Types: Cocaine    Comment: coccaine use  as per allscripts     Social History     Tobacco Use   Smoking Status Current Every Day Smoker    Packs/day: 1 00    Years: 40 00    Pack years: 40 00    Types: Cigarettes    Start date: 1980   Smokeless Tobacco Never Used   Tobacco Comment    since age 23     Family History   Problem Relation Age of Onset    Lung cancer Mother     Ovarian cancer Maternal Grandmother     Glaucoma Paternal Grandmother     Asthma Son     Alcohol abuse Neg Hx     Depression Neg Hx     Drug abuse Neg Hx     Substance Abuse Neg Hx        Meds/Allergies     (Not in a hospital admission)      No Known Allergies    Objective     /85   Pulse 70   Temp (!) 97 2 °F (36 2 °C) (Temporal)   Resp 18   Ht 5' 3" (1 6 m)   Wt 83 9 kg (185 lb)   SpO2 97%   BMI 32 77 kg/m²       PHYSICAL EXAM    Gen: NAD  CV: RRR  CHEST: Clear  ABD: soft, NT/ND  EXT: no edema      ASSESSMENT/PLAN:  This is a 56y o  year old female here for colonoscopy for chronic constipation and history of colon polyps, and she is stable and optimized for her procedure      Fracisco Love MD

## 2021-01-05 ENCOUNTER — TELEMEDICINE (OUTPATIENT)
Dept: INTERNAL MEDICINE CLINIC | Facility: CLINIC | Age: 61
End: 2021-01-05
Payer: COMMERCIAL

## 2021-01-05 ENCOUNTER — TELEPHONE (OUTPATIENT)
Dept: GASTROENTEROLOGY | Facility: AMBULARY SURGERY CENTER | Age: 61
End: 2021-01-05

## 2021-01-05 VITALS — WEIGHT: 185 LBS | BODY MASS INDEX: 32.78 KG/M2 | HEIGHT: 63 IN

## 2021-01-05 DIAGNOSIS — Z79.899 ENCOUNTER FOR LONG-TERM CURRENT USE OF MEDICATION: ICD-10-CM

## 2021-01-05 DIAGNOSIS — I77.810 MILD DILATION OF ASCENDING AORTA (HCC): ICD-10-CM

## 2021-01-05 DIAGNOSIS — F41.0 GENERALIZED ANXIETY DISORDER WITH PANIC ATTACKS: ICD-10-CM

## 2021-01-05 DIAGNOSIS — K21.9 GASTROESOPHAGEAL REFLUX DISEASE WITHOUT ESOPHAGITIS: ICD-10-CM

## 2021-01-05 DIAGNOSIS — E03.9 ACQUIRED HYPOTHYROIDISM: ICD-10-CM

## 2021-01-05 DIAGNOSIS — K59.09 OTHER CONSTIPATION: ICD-10-CM

## 2021-01-05 DIAGNOSIS — E55.9 VITAMIN D DEFICIENCY: ICD-10-CM

## 2021-01-05 DIAGNOSIS — F41.9 ANXIETY: ICD-10-CM

## 2021-01-05 DIAGNOSIS — F51.02 ADJUSTMENT INSOMNIA: ICD-10-CM

## 2021-01-05 DIAGNOSIS — F17.200 SMOKING: ICD-10-CM

## 2021-01-05 DIAGNOSIS — F33.1 MAJOR DEPRESSIVE DISORDER, RECURRENT EPISODE, MODERATE (HCC): ICD-10-CM

## 2021-01-05 DIAGNOSIS — R53.83 OTHER FATIGUE: ICD-10-CM

## 2021-01-05 DIAGNOSIS — F41.1 GENERALIZED ANXIETY DISORDER WITH PANIC ATTACKS: ICD-10-CM

## 2021-01-05 DIAGNOSIS — K63.5 POLYP OF COLON, UNSPECIFIED PART OF COLON, UNSPECIFIED TYPE: Primary | ICD-10-CM

## 2021-01-05 DIAGNOSIS — F90.2 ADHD (ATTENTION DEFICIT HYPERACTIVITY DISORDER), COMBINED TYPE: ICD-10-CM

## 2021-01-05 DIAGNOSIS — E78.00 PURE HYPERCHOLESTEROLEMIA: ICD-10-CM

## 2021-01-05 PROCEDURE — 99214 OFFICE O/P EST MOD 30 MIN: CPT | Performed by: INTERNAL MEDICINE

## 2021-01-05 PROCEDURE — 3008F BODY MASS INDEX DOCD: CPT | Performed by: NURSE PRACTITIONER

## 2021-01-05 RX ORDER — ALPRAZOLAM 1 MG/1
TABLET ORAL
Qty: 60 TABLET | Refills: 0 | Status: SHIPPED | OUTPATIENT
Start: 2021-01-05 | End: 2021-04-07 | Stop reason: ALTCHOICE

## 2021-01-05 RX ORDER — LUBIPROSTONE 8 UG/1
8 CAPSULE, GELATIN COATED ORAL 2 TIMES DAILY WITH MEALS
Qty: 60 CAPSULE | Refills: 1 | Status: SHIPPED | OUTPATIENT
Start: 2021-01-05 | End: 2021-01-14

## 2021-01-05 NOTE — TELEPHONE ENCOUNTER
According to chart, her PCP ordered her amitiza today  We can see if this is better covered by her insurance as an alternative   If this is also too expensive she should let us know

## 2021-01-05 NOTE — ASSESSMENT & PLAN NOTE
Thyroid adequately replaced, depression has improved  Episodes of snoring and daytime sleepiness  Agrees to do home sleepy study

## 2021-01-08 ENCOUNTER — TELEPHONE (OUTPATIENT)
Dept: SLEEP CENTER | Facility: CLINIC | Age: 61
End: 2021-01-08

## 2021-01-08 ENCOUNTER — TRANSCRIBE ORDERS (OUTPATIENT)
Dept: SLEEP CENTER | Facility: CLINIC | Age: 61
End: 2021-01-08

## 2021-01-08 DIAGNOSIS — F51.02 ADJUSTMENT INSOMNIA: Primary | ICD-10-CM

## 2021-01-14 DIAGNOSIS — K59.00 CONSTIPATION, UNSPECIFIED CONSTIPATION TYPE: ICD-10-CM

## 2021-01-14 RX ORDER — LINACLOTIDE 72 UG/1
1 CAPSULE, GELATIN COATED ORAL DAILY
Qty: 30 CAPSULE | Refills: 5 | Status: SHIPPED | OUTPATIENT
Start: 2021-01-14 | End: 2021-02-23

## 2021-01-14 NOTE — TELEPHONE ENCOUNTER
Spoke with patient she is unable to make a bowel movement, its been 4 days , she is asking if we can try and get a prior auth for Linzess , patient said that was the only thing that seemed to help her when tried in the past   I can start Auth for Linzess , but a new script will need to be sent if approved to pharmacy , they said they dont have script on file for [place]

## 2021-01-23 ENCOUNTER — TELEPHONE (OUTPATIENT)
Dept: OTHER | Facility: OTHER | Age: 61
End: 2021-01-23

## 2021-01-23 NOTE — TELEPHONE ENCOUNTER
Patient request callback to obtain a referral to be tested for Covid /  Amairani Cody her son just tested positive as well as they both have health issue      Paged out to on call daivd shields

## 2021-01-25 ENCOUNTER — TELEMEDICINE (OUTPATIENT)
Dept: INTERNAL MEDICINE CLINIC | Facility: CLINIC | Age: 61
End: 2021-01-25

## 2021-01-25 DIAGNOSIS — Z20.822 EXPOSURE TO COVID-19 VIRUS: Primary | ICD-10-CM

## 2021-01-25 PROCEDURE — 99213 OFFICE O/P EST LOW 20 MIN: CPT | Performed by: INTERNAL MEDICINE

## 2021-01-25 NOTE — PROGRESS NOTES
COVID-19 Virtual Visit     Assessment/Plan:    Problem List Items Addressed This Visit     None      Visit Diagnoses     Exposure to COVID-19 virus    -  Primary    Relevant Orders    Novel Coronavirus (Covid-19),PCR [facility] - Collected at   [facility] or [facility]         Disposition:     I recommended COVID-19 PCR testing on or after day 5 since last exposure and if negative can end quarantine after 7 days  Patient was instructed to watch for symptoms until 14 days after exposure  If patient were to develop symptoms, they should immediately self isolate and call our office for further guidance  I referred patient to one of our centralized sites for a COVID-19 swab  Recommend to be tested no earlier than tomorrow 1/26  Monitor for symptoms  Recommend to quarantine until results available  I have spent 5 minutes directly with the patient  Greater than 50% of this time was spent in counseling/coordination of care regarding: instructions for management, patient and family education and impressions  Encounter provider Rasheed Cruz MD    Provider located at [address] [place] [id number]    Recent Visits  No visits were found meeting these conditions  Showing recent visits within past 7 days and meeting all other requirements     Today's Visits  Date Type Provider Dept   01/25/21 Telemedicine Rasheed Cruz, [facility]   Showing today's visits and meeting all other requirements     Future Appointments  No visits were found meeting these conditions  Showing future appointments within next 150 days and meeting all other requirements      This virtual check-in was done via [employer] and patient was informed that this is a secure, HIPAA-compliant platform  She agrees to proceed      Patient agrees to participate in a virtual check in via telephone or video visit instead of presenting to the office to address urgent/immediate medical needs  Patient is aware this is a billable service  After connecting through [facility], the patient was identified by name and date of birth  Hakeem Blackmon was informed that this was a telemedicine visit and that the exam was being conducted confidentially over secure lines  My office door was closed  No one else was in the room  Hakeem Blackmon acknowledged consent and understanding of privacy and security of the telemedicine visit  I informed the patient that I have reviewed her record in Epic and presented the opportunity for her to ask any questions regarding the visit today  The patient agreed to participate  Subjective:   Hakeem Blackmon is a 56 y o  female who is concerned about COVID-19  Patient's symptoms include fatigue, malaise, myalgias and headache  Patient denies fever, chills, congestion, sore throat and cough  Date of exposure: 1/22/2021    Exposure:   Contact with a person who is under investigation (PUI) for or who is positive for COVID-19 within the last 14 days?: Yes    Hospitalized recently for fever and/or lower respiratory symptoms?: No      Currently a healthcare worker that is involved in direct patient care?: No      Works in a special setting where the risk of COVID-19 transmission may be high? (this may include long-term care, correctional and FDC facilities; homeless shelters; assisted-living facilities and group homes ): No      Resident in a special setting where the risk of COVID-19 transmission may be high? (this may include long-term care, correctional and FDC facilities; homeless shelters; assisted-living facilities and group homes ): No      She reports her son tested (+) for COVID 3 days ago, she was exposed to him the day prior  She denies any cough or cold symptoms, feels more achy the past few days  She also complains of occasional headaches    Her grand daughter is going to be tested today and she will be with them in the car  No results found for: GIUSEPPE MillerRyan AREVALOfranki Gwen  Past Medical History:   Diagnosis Date    Anxiety     Anxiety     Colon polyp     Panic attacks      Past Surgical History:   Procedure Laterality Date   Brent Aragon 98    COLONOSCOPY      HYSTERECTOMY       Current Outpatient Medications   Medication Sig Dispense Refill    ALPRAZolam (XANAX) 1 mg tablet Take 1/2 to 1 tab PO bid prn anxiety 60 tablet 0    amphetamine-dextroamphetamine (ADDERALL XR) 20 MG 24 hr capsule Take 1 capsule (20 mg total) by mouth every morningMax Daily Amount: 20 mg 30 capsule 0    desvenlafaxine (PRISTIQ) 100 mg 24 hr tablet Take 1 tablet (100 mg total) by mouth daily 30 tablet 2    levothyroxine 25 mcg tablet Take 1 tab x 5 days, 2 tabs x 2 days a week 100 tablet 1    linaCLOtide (Linzess) 72 MCG CAPS Take 1 tablet by mouth daily 30 capsule 5    traZODone (DESYREL) 50 mg tablet Take 1 tablet (50 mg total) by mouth daily at bedtime as needed for sleep 30 tablet 1     No current facility-administered medications for this visit  No Known Allergies    Review of Systems   Constitutional: Positive for fatigue  Negative for chills and fever  HENT: Negative for congestion and sore throat  Respiratory: Negative for cough  Musculoskeletal: Positive for myalgias  Neurological: Positive for headaches  Objective: There were no vitals filed for this visit  Physical Exam  Constitutional:       General: She is not in acute distress  Appearance: Normal appearance  She is not ill-appearing  HENT:      Head: Normocephalic and atraumatic  Pulmonary:      Effort: Pulmonary effort is normal  No respiratory distress  Neurological:      General: No focal deficit present  Mental Status: She is alert and oriented to person, place, and time  Psychiatric:         Mood and Affect: Mood is anxious           Speech: Speech normal          Behavior: Behavior normal  VIRTUAL VISIT DISCLAIMER    Emily Mandujano acknowledges that she has consented to an online visit or consultation  She understands that the online visit is based solely on information provided by her, and that, in the absence of a face-to-face physical evaluation by the physician, the diagnosis she receives is both limited and provisional in terms of accuracy and completeness  This is not intended to replace a full medical face-to-face evaluation by the physician  Emily Mandujano understands and accepts these terms

## 2021-01-25 NOTE — TELEPHONE ENCOUNTER
Please ask if they got tested for COVID over the weekend, if not, schedule today with me or Rafat Cain    *see task for

## 2021-01-26 DIAGNOSIS — Z20.822 EXPOSURE TO COVID-19 VIRUS: ICD-10-CM

## 2021-01-26 PROCEDURE — U0003 INFECTIOUS AGENT DETECTION BY NUCLEIC ACID (DNA OR RNA); SEVERE ACUTE RESPIRATORY SYNDROME CORONAVIRUS 2 (SARS-COV-2) (CORONAVIRUS DISEASE [COVID-19]), AMPLIFIED PROBE TECHNIQUE, MAKING USE OF HIGH THROUGHPUT TECHNOLOGIES AS DESCRIBED BY CMS-2020-01-R: HCPCS | Performed by: INTERNAL MEDICINE

## 2021-01-26 PROCEDURE — U0005 INFEC AGEN DETEC AMPLI PROBE: HCPCS | Performed by: INTERNAL MEDICINE

## 2021-01-27 ENCOUNTER — TELEPHONE (OUTPATIENT)
Dept: INTERNAL MEDICINE CLINIC | Facility: CLINIC | Age: 61
End: 2021-01-27

## 2021-01-27 ENCOUNTER — TELEMEDICINE (OUTPATIENT)
Dept: INTERNAL MEDICINE CLINIC | Facility: CLINIC | Age: 61
End: 2021-01-27
Payer: COMMERCIAL

## 2021-01-27 DIAGNOSIS — U07.1 COVID-19: Primary | ICD-10-CM

## 2021-01-27 LAB — SARS-COV-2 RNA RESP QL NAA+PROBE: POSITIVE

## 2021-01-27 PROCEDURE — 99213 OFFICE O/P EST LOW 20 MIN: CPT | Performed by: NURSE PRACTITIONER

## 2021-01-27 NOTE — ASSESSMENT & PLAN NOTE
Continue symptomatic treatment  Self quarantine  Monitor oxygen levels  Advised to go to the ER if pulse ox <90%

## 2021-01-27 NOTE — PROGRESS NOTES
COVID-19 Virtual Visit     Assessment/Plan:    Problem List Items Addressed This Visit        Other    COVID-19 - Primary     Continue symptomatic treatment  Self quarantine  Monitor oxygen levels  Advised to go to the ER if pulse ox <90%  Disposition:     I recommended continued isolation until at least 24 hours have passed since recovery defined as resolution of fever without the use of fever-reducing medications AND improvement in COVID symptoms AND 10 days have passed since onset of symptoms (or 10 days have passed since date of first positive viral diagnostic test for asymptomatic patients)  I have spent 10 minutes directly with the patient  Greater than 50% of this time was spent in counseling/coordination of care regarding: instructions for management and patient and family education  Encounter provider KEATON Hendricks    Provider located at [address] [place] [id number]    Recent Visits  Date Type Provider Dept   01/25/21 Telemedicine Natalie Green MD [facility] Internal Med   Showing recent visits within past 7 days and meeting all other requirements     Today's Visits  Date Type Provider Dept   01/27/21 Telephone Natalie Green, [facility] Internal Med   01/27/21 [address] Internal [facility]   Showing today's visits and meeting all other requirements     Future Appointments  No visits were found meeting these conditions  Showing future appointments within next 150 days and meeting all other requirements      This virtual check-in was done via [employer] and patient was informed that this is a secure, HIPAA-compliant platform  She agrees to proceed  Patient agrees to participate in a virtual check in via telephone or video visit instead of presenting to the office to address urgent/immediate medical needs   Patient is aware this is a billable service  After connecting through [facility], the patient was identified by name and date of birth  Malcom Gresham was informed that this was a telemedicine visit and that the exam was being conducted confidentially over secure lines  My office door was closed  No one else was in the room  Malcom Gresham acknowledged consent and understanding of privacy and security of the telemedicine visit  I informed the patient that I have reviewed her record in Epic and presented the opportunity for her to ask any questions regarding the visit today  The patient agreed to participate  Subjective:   Malcom Gresham is a 56 y o  female who has been screened for COVID-19  Symptom change since last report: worsening  Patient's symptoms include chills, fatigue, malaise, cough, shortness of breath, chest tightness, nausea, myalgias and headache  Patient denies fever, congestion, rhinorrhea, sore throat, anosmia, loss of taste, abdominal pain, vomiting and diarrhea  Eric Philip has been staying home and has isolated themselves in her home  She is taking care to not share personal items and is cleaning all surfaces that are touched often, like counters, tabletops, and doorknobs using household cleaning sprays or wipes  She is wearing a mask when she leaves her room  Date of symptom onset: 2021  Date of exposure: 2021  Date of positive COVID-19 PCR: 2021    Eric Philip is not feeling well  She has had intermittent nausea and headaches  She has developed a cough and chest tightness  She feels short of breath with exertion  Her pulse ox has been >90% with rest and ambulation          Lab Results   Component Value Date    SARSCOV2 Positive (A) 2021     Past Medical History:   Diagnosis Date    Anxiety     Anxiety     Colon polyp     Panic attacks      Past Surgical History:   Procedure Laterality Date     SECTION      COLONOSCOPY      HYSTERECTOMY       Current Outpatient Medications Medication Sig Dispense Refill    ALPRAZolam (XANAX) 1 mg tablet Take 1/2 to 1 tab PO bid prn anxiety 60 tablet 0    amphetamine-dextroamphetamine (ADDERALL XR) 20 MG 24 hr capsule Take 1 capsule (20 mg total) by mouth every morningMax Daily Amount: 20 mg 30 capsule 0    desvenlafaxine (PRISTIQ) 100 mg 24 hr tablet Take 1 tablet (100 mg total) by mouth daily 30 tablet 2    levothyroxine 25 mcg tablet Take 1 tab x 5 days, 2 tabs x 2 days a week 100 tablet 1    linaCLOtide (Linzess) 72 MCG CAPS Take 1 tablet by mouth daily 30 capsule 5    traZODone (DESYREL) 50 mg tablet Take 1 tablet (50 mg total) by mouth daily at bedtime as needed for sleep 30 tablet 1     No current facility-administered medications for this visit  No Known Allergies    Review of Systems   Constitutional: Positive for chills and fatigue  Negative for fever  HENT: Negative for congestion, rhinorrhea and sore throat  Respiratory: Positive for cough, chest tightness and shortness of breath  Gastrointestinal: Positive for nausea  Negative for abdominal pain, diarrhea and vomiting  Musculoskeletal: Positive for myalgias  Neurological: Positive for headaches  Objective: There were no vitals filed for this visit  Physical Exam  Constitutional:       Appearance: She is well-developed  HENT:      Head: Normocephalic  Eyes:      Pupils: Pupils are equal, round, and reactive to light  Pulmonary:      Effort: Pulmonary effort is normal    Neurological:      Mental Status: She is alert  Psychiatric:         Behavior: Behavior normal        VIRTUAL VISIT DISCLAIMER    Lyn Lynne acknowledges that she has consented to an online visit or consultation  She understands that the online visit is based solely on information provided by her, and that, in the absence of a face-to-face physical evaluation by the physician, the diagnosis she receives is both limited and provisional in terms of accuracy and completeness   This is not intended to replace a full medical face-to-face evaluation by the physician  Kavin Simmons understands and accepts these terms

## 2021-01-27 NOTE — TELEPHONE ENCOUNTER
You tested positive for COVID  How are you feeling?     Schedule tomorrow or Friday with Verner Messing    *see task for

## 2021-01-28 ENCOUNTER — TELEMEDICINE (OUTPATIENT)
Dept: PSYCHIATRY | Facility: CLINIC | Age: 61
End: 2021-01-28
Payer: COMMERCIAL

## 2021-01-28 DIAGNOSIS — F41.0 GENERALIZED ANXIETY DISORDER WITH PANIC ATTACKS: Primary | ICD-10-CM

## 2021-01-28 DIAGNOSIS — F33.1 MAJOR DEPRESSIVE DISORDER, RECURRENT EPISODE, MODERATE (HCC): ICD-10-CM

## 2021-01-28 DIAGNOSIS — F90.2 ADHD (ATTENTION DEFICIT HYPERACTIVITY DISORDER), COMBINED TYPE: ICD-10-CM

## 2021-01-28 DIAGNOSIS — F41.1 GENERALIZED ANXIETY DISORDER WITH PANIC ATTACKS: Primary | ICD-10-CM

## 2021-01-28 PROCEDURE — 99213 OFFICE O/P EST LOW 20 MIN: CPT | Performed by: STUDENT IN AN ORGANIZED HEALTH CARE EDUCATION/TRAINING PROGRAM

## 2021-01-28 RX ORDER — HYDROXYZINE HYDROCHLORIDE 25 MG/1
25 TABLET, FILM COATED ORAL EVERY 8 HOURS PRN
Qty: 90 TABLET | Refills: 0 | Status: SHIPPED | OUTPATIENT
Start: 2021-01-28 | End: 2021-11-16 | Stop reason: SDUPTHER

## 2021-01-28 NOTE — BH TREATMENT PLAN
TREATMENT PLAN (Medication Management Only)        [facility]    Name and Date of Birth:  Rimma Sherman 56 y o  1960  Date of Treatment Plan: January 28, 2021  Diagnosis/Diagnoses:    1  Generalized anxiety disorder with panic attacks    2  ADHD (attention deficit hyperactivity disorder), combined type    3  Major depressive disorder, recurrent episode, moderate (HCC)      Strengths/Personal Resources for Self-Care: supportive family, supportive friends, taking medications as prescribed, ability to adapt to life changes, ability to communicate needs, ability to communicate well, ability to listen, ability to reason, ability to understand psychiatric illness, family ties, general fund of knowledge, good understanding of illness, independence, motivation for treatment, being resoureceful, self-reliance, sense of humor, willingness to work on problems  Area/Areas of need (in own words): anxiety symptoms, depressive symptoms, ADHD symptoms "My focus and attention still isn't good  I start things and don't finish them  I want that to get better"  1  Long Term Goal: improve ADHD symptoms, lessen anxiety and episodic depression  Target Date:1 year - 1/28/2022  Person/Persons responsible for completion of goal: Davian Ulrich  2  Short Term Objective (s) - How will we reach this goal?:   A  Provider new recommended medication/dosage changes and/or continue medication(s): continue current medications as prescribed  B  Take psychiatric medications responsibly  [place] all scheduled appointments    D> Expand social network - see family 2-3x per week  E  Start psychotherapy by June 2021  Target Date:6 months - 7/28/2021  Person/Persons Responsible for Completion of Goal: Davian Ulrich  Progress Towards Goals: continuing treatment  Treatment Modality: medication management every 2 months, referral for individual psychotherapy  Review due 180 days from date of this plan: 6 months - 7/28/2021  Expected length of service: ongoing treatment  My Physician/PA/NP and I have developed this plan together and I agree to work on the goals and objectives  I understand the treatment goals that were developed for my treatment  Treatment Plan completed with assistance and input from patient and verbal consent provided  Treatment plan was not signed at time of office visit secondary to COVID-19 social distancing guidelines

## 2021-01-28 NOTE — PSYCH
MEDICATION MANAGEMENT NOTE        [address]      Name and Date of Birth:  Ana Ott 56 y o  1960 MRN: [id number]    Date of Visit: January 28, 2021    Reason for Visit: Follow-up visit for medication management     Virtual Visit Disclaimer: The patient was identified by name and date of birth  Ana Ott was informed that this is a telemedicine visit and that the visit is being conducted through [employer] and patient was informed that this is a secure, HIPAA-compliant platform  She agrees to proceed     My office door was closed  No one else was in the room  She acknowledged consent and understanding of privacy and security of the video platform  The patient has agreed to participate and understands they can discontinue the visit at any time  Patient is aware this is a billable service  SUBJECTIVE:    Ana Ott is a 56 y o  female with past psychiatric history significant for major depressive disorder, generalized anxiety with panic attacks, and ADHD (inattentive type) who was virtually seen and evaluated today at the [address] [place] [address] outpatient clinic for follow-up and medication management  Honorio Myers presents as anxious, episodically tearful, and restless  She shares that she has been officially diagnosed with COVID-19 and he  was currently admitted to the hospital secondary to declining health related to the virus  Nonetheless, Jeri's thoughts are organized, linear, and reality-based  She completes assessment without significant difficulty  Honorio Myers endorses an uptick in anxiety over the last 1 week, likely secondary to physical illness and fearfulness related to her 's declining health  She is experiencing daily panic symptomatology as a result, characterized by SOB, tremor, and feelings of impending doom  Her sleep and appetite are poor secondary to ongoing thought rumination   Honorio Myers endorses limited energy and motivation secondary to the illness  She denies anhedonia or acute thoughts of SI/HI  She is future-oriented and demonstrates self-preservation as she is actively seeking physical and psychiatric ways to manage her health  She remains in close contact with her family and friends who provide emotional and psychological support  She endorses ongoing compliance with current psychotropic medication regimen and denies adverse medication side effects  I spoke at length with Rojas Navarrete regarding COVID-19 and the impact of the virus on her overall health  Given the propensity for hypertension and current anxiety symptoms related to the illness, she was encouraged to refrain from taking daily Adderall as this may exacerbate her symptoms  She was receptive and will stop this medication today  Additionally, she was encouraged to exercise caution regarding her use of Xanax (Rx by PCP) as this could cause worsening respiratory mechanics  She was agreeable to start PRN Hydroxyzine for breakthrough anxiety which will likely improve sleep as well  Rojas Navarrete currently denies any acute symptomatology suggestive of tiffany/hypomania  She does not exhibit objective evidence of hayde psychosis  She offers no further complaints  Current Rating Scores:     None completed today      Review Of Systems:      Constitutional fever, chills, hot flashes, sweating, feeling poorly, feeling tired, low energy and as noted in HPI   ENT dizziness   Cardiovascular elevated blood pressure   Respiratory shortness of breath, cough and dyspnea on exertion   Gastrointestinal abdominal discomfort   Genitourinary negative   Musculoskeletal negative   Integumentary negative   Neurological headache   Endocrine negative   Other Symptoms none, all other systems are negative       Past Psychiatric History: (unchanged information from previous note copied and italicized) - Information that is bolded has been updated       Inpatient psychiatric admissions: Denies  Prior outpatient psychiatric linkage: Previously linked with Dr Elena Aceves via [facility]  Past/current psychotherapy: Denies  History of suicidal attempts/gestures: Denies  History of violence/aggressive behaviors: Denies  Psychotropic medication trials: Seroquel (sleep - too sedating), Zoloft (minimal benefit, gave her nightmares), Adderall, Xanax, Trazodone, Pristiq   Substance abuse inpatient/outpatient rehabilitation: Denies     Substance Abuse History: (unchanged information from previous note copied and italicized) - Information that is bolded has been updated       Denies a history of ETOH or illicit substance abuse  No prior DWIs or DUIs  No prior involvement with law enforcement secondary to substance use      Social History: (unchanged information from previous note copied and italicized) - Information that is bolded has been updated       Developmental: Denies a history of milestone/developmental delay  Denies a history of in-utero exposure to toxins/illicit substances   There is no documented history of IEP or need for special education  Education: high school diploma/GED  Marital history:   Living arrangement, social support:  and extended family, has 2 children (son 28 y/o, daughter 45 y/o)  Occupational History: Recently terminated from employment at [employer] secondary to COVID-23  Access to firearms: Denies direct access to weapons/firearms  Jeri Acosta has no history of arrests or violence with a deadly weapon       Traumatic History: (unchanged information from previous note copied and italicized) - Information that is bolded has been updated        Abuse:none is reported  Other Traumatic Events:  being incarcerated was difficult         Past Medical History:    Past Medical History:   Diagnosis Date    Anxiety     Anxiety     Colon polyp     Panic attacks      Past Medical History Pertinent Negatives:   Diagnosis Date Noted    Colon cancer ([facility] [address] ) 2021    Osteoporosis 2018     Past Surgical History:   Procedure Laterality Date     SECTION  1984    COLONOSCOPY      HYSTERECTOMY       No Known Allergies    Substance Abuse History:    Social History     Substance and Sexual Activity   Alcohol Use Yes    Comment: Rare- 1 every few weeks      Social History     Substance and Sexual Activity   Drug Use No    Types: Cocaine    Comment: coccaine use  as per allscripts       Social History:    Social History     Socioeconomic History    Marital status: /Civil Union     Spouse name: Not on file    Number of children: Not on file    Years of education: Not on file    Highest education level: Not on file   Occupational History    Not on file   Social Needs    Financial resource strain: Not on file    Food insecurity     Worry: Not on file     Inability: Not on file   [employer] needs     Medical: Not on file     Non-medical: Not on file   Tobacco Use    Smoking status: Current Every Day Smoker     Packs/day: 1 00     Years: 40 00     Pack years: 40 00     Types: Cigarettes     Start date: 1980    Smokeless tobacco: Never Used    Tobacco comment: since age 23   Substance and Sexual Activity    Alcohol use: Yes     Comment: Rare- 1 every few weeks     Drug use: No     Types: Cocaine     Comment: coccaine use  as per allscripts    Sexual activity: Not on file   Lifestyle    Physical activity     Days per week: Not on file     Minutes per session: Not on file    Stress: Not on file   Relationships    Social connections     Talks on phone: Not on file     Gets together: Not on file     Attends Taoist service: Not on file     Active member of club or organization: Not on file     Attends meetings of clubs or organizations: Not on file     Relationship status: Not on file    Intimate partner violence     Fear of current or ex partner: Not on file     Emotionally abused: Not on file     Physically abused: Not on file     Forced sexual activity: Not on file   Other Topics Concern    Not on file   Social History Narrative        Has a son and a daughter in [place] - 3 grandchildren    Working - at [employer] in a [place] while  incarcerated almost 2 yrs        Drinks Coffee: 3-4 cups       Family Psychiatric History:     Family History   Problem Relation Age of Onset    Lung cancer Mother     Ovarian cancer Maternal Grandmother     Glaucoma Paternal Grandmother     Asthma Son     Alcohol abuse Neg Hx     Depression Neg Hx     Drug abuse Neg Hx     Substance Abuse Neg Hx        History Review: The following portions of the patient's history were reviewed and updated as appropriate: allergies, current medications, past family history, past medical history, past social history, past surgical history and problem list          OBJECTIVE:     Vital signs in last 24 hours: There were no vitals filed for this visit      Mental Status Evaluation:    Appearance disheveled, looks stated age   Behavior pleasant, cooperative, appears anxious, good eye contact, restless   Speech normal rate, normal volume, normal pitch, coherent   Mood dysphoric, anxious   Affect constricted, tearful   Thought Processes organized, goal directed, normal rate of thoughts, normal abstract reasoning   Associations intact associations   Thought Content no overt delusions   Perceptual Disturbances: no auditory hallucinations, no visual hallucinations   Abnormal Thoughts  Risk Potential Suicidal ideation - None  Homicidal ideation - None  Potential for aggression - No   Orientation oriented to person, place, time/date and situation   Memory recent and remote memory grossly intact   Consciousness alert and awake   Attention Span Concentration Span attention span and concentration are age appropriate   Intellect appears to be of average intelligence   Insight intact and good   Judgement intact and good   Muscle Strength and  Gait unable to assess today due to virtual visit   Motor activity unable to assess today due to virtual visit   Language no difficulty naming common objects, no difficulty repeating a phrase   Fund of Knowledge adequate knowledge of current events  adequate fund of knowledge regarding past history  adequate fund of knowledge regarding vocabulary    Pain none   Pain Scale 0       Laboratory Results: I have personally reviewed all pertinent laboratory/tests results    Recent Labs (last 4 months):   Orders Only on 01/26/2021   Component Date Value    [facility]   Component Date Value    Case Report 01/04/2021                      Value:Surgical Pathology Report                         Case: [id number]                                   Authorizing Provider:  Royer Lebron MD  Collected:           01/04/2021 0906              Ordering Location:     [facility]        Received:            01/04/2021 [facility]                                                           Pathologist:           Max Duff MD                                                                 Specimens:   A) - Polyp, Colorectal, Ascending colon polyp, Cold forceps                                         B) - Polyp, Colorectal, Transverse colon polyp x3, cold forceps, cold snare                         C) - Polyp, Colorectal, hot snare, Descending                                                       D) - Polyp, Colorectal, Sigmoid x6, cold forceps , cold snare                              Final Diagnosis 01/04/2021                      Value: This result contains rich text formatting which cannot be displayed here   Additional Information 01/04/2021                      Value: This result contains rich text formatting which cannot be displayed here  Cash Jillian Gross Description 01/04/2021                      Value: This result contains rich text formatting which cannot be displayed here  Orders Only on 12/02/2020   Component Date Value    TSH 12/02/2020 3 760    Orders Only on 11/27/2020   Component Date Value    THYROID MICROSOMAL ANTIB* 11/27/2020 261*    Thyroglobulin Ab 11/27/2020 <1 0    Orders Only on 10/13/2020   Component Date Value    TSH 10/13/2020 5 410*    T4,Free (Direct) 10/13/2020 1 38        Suicide/Homicide Risk Assessment:    Risk of Harm to Self:  The following ratings are based on assessment at the time of the interview, observation over the last 3 months and review of records  Demographic risk factors include: , age: over 48 or older  Historical Risk Factors include: history of depression, history of anxiety  Recent Specific Risk Factors include: current depressive symptoms, current anxiety symptoms  Protective Factors: no current suicidal ideation, ability to adapt to change, access to mental health treatment, being a parent, being , compliant with medications, compliant with mental health treatment, connection to own children, effective coping skills, effective decision-making skills, effective problem solving skills, good self-esteem, having a desire to be alive, having a sense of purpose or meaning in life, healthy fear of risky behaviors and pain, impulse control, medical compliance, no substance use problems, opportunities to contribute to community, responsibilities and duties to others, restricted access to lethal means, stable living environment, sense of personal control, supportive family, supportive friends  Weapons: none  The following steps have been taken to ensure weapons are properly secured: not applicable  Based on today's assessment, John Connolly presents the following risk of harm to self: none    Risk of Harm to Others:   The following ratings are based on assessment at the time of the interview, observation over the last 3 months and review of records  Demographic Risk Factors include: none  Historical Risk Factors include: none  Recent Specific Risk Factors include: none  Protective Factors: no current homicidal ideation  Weapons: none  The following steps have been taken to ensure weapons are properly secured: not applicable  Based on today's assessment, Eric Philip presents the following risk of harm to others: none    The following interventions are recommended: no intervention changes needed      Lethality Statement:    Unchanged from previous assessment      Assessment/Plan:     Malcom Gresham is a 56 y o  female with past psychiatric history significant for major depressive disorder, generalized anxiety with panic attacks, and ADHD (inattentive type) who was virtually seen and evaluated today at the [address] [place] [address] outpatient clinic for follow-up and medication management  Jeri endorses worsening anxiety, "panic attacks", and intermittent depressive symptomatology over the past 10 years in the context of numerous psychosocial stressors including financial instability, familial discord, and homelessness  Eric Philip states that her life was "normal" until her  was arrested and incarcerated  As such, she has been increasingly more depressed and notices a profound increase in debilitating anxiety and subsequent panic attacks  Aggravating factors include familial stress, poor finances, and "being out in society"  Alleviating factors include mindfulness and self-help books about guided imagery  Her PCP prescribed Xanax 1mg PO BID with limited benefit  In addition to anxiousness, Eric Philip endorses several neurovegetative symptoms of depression  Her sleep is poor and her appetite is limited  She endorses a 30lb weight gain over the last year without significant dietary intake  Her energy is "nonexhistent" and her ability to concentrate is quite limited  Her PCP prescribed Adderall 5mg PO BID given her history of ADHD with fair benefit   Eric Philip endorses anhedonia, limited interaction with grandchildren, and episodic hopelessness and guilt  In the past, Pablito Mock was treated by Dr Jose Manuel Hernandez with Zoloft which was optimized to 200mg  She endorsed limited improvement in mood and severe nightmares so this agent was discontinued  Jeri denies acute or chronic symptomatology suggestive of tiffany or hypomania and she denies perceptual disturbances such as A/V hallucinations, paranoia, ideas of reference or delusional thoughts  She denies recent ETOH or illicit substance abuse but does smoke cigarretes      Psychopharmacologically, Pablito Mock is tolerating her medications well but is struggling physically secondary to COVID-19 illness  Given propensity for hypertension and worsening anxiety with the viral illness, she was encouraged to refrain from use of Adderall XR which will likely exacerbate these symptoms  She was receptive  Will start PRN Hydroxyzine for breakthrough symptoms as Xanax should be used with caution given it's propensity to worsen respiratory mechanics   Risks/benefits/alternaitives to treatment discussed to which Pablito Mock voiced understanding and consented to treatment          DSM-V Diagnoses:      1 ) Generalized anxiety disorder with panic attacks  2 ) Major Depressive Disorder, recurrent episode, moderate  3 ) ADHD, adult type        Treatment Recommendations/Precautions:     1 ) Generalized anxiety disorder with panic attacks   - Hx of Zoloft trial with limited benefit  - Continue Desvenlafaxine (Pristiq) 100mg PO Daily  - Xanax 1mg PO BID Rx by PCP  - Start Hydroxyzine 25mg Q8H PRN for breakthrough anxiety (alternate with Xanax)  - Psychoeducation provided regarding the importance of exercise and healthy dietary choices and their impact on mood, energy, and motivation  - Counseled to avoid ETOH, illict substances, and nicotine secondary to the detrimental effects of these substances on mental and physical health  - Encouraged to engage in non-verbal forms of therapy such as art therapy, music therapy, and mindfulness  - Discussed the bio-psycho-social model to treatment and therapeutic exercises/interventions were attempted to cognitively restructure thoughts     2 ) Major Depressive Disorder, recurrent episode, moderate  - Hx of Zoloft trial with limited benefit  - Continue Desvenlafaxine (Pristiq) 100mg PO Daily  - Xanax 1mg PO BID Rx by PCP  - Start Hydroxyzine 25mg Q8H PRN for breakthrough anxiety (alternate with Xanax)  - Psychoeducation provided regarding the importance of exercise and healthy dietary choices and their impact on mood, energy, and motivation  - Counseled to avoid ETOH, illict substances, and nicotine secondary to the detrimental effects of these substances on mental and physical health  - Encouraged to engage in non-verbal forms of therapy such as art therapy, music therapy, and mindfulness  - Discussed the bio-psycho-social model to treatment and therapeutic exercises/interventions were attempted to cognitively restructure thoughts     3 ) ADHD, adult type  - Originally prescribed by PCP - Adderall 5mg PO BID  - Hold Adderall XR 10mg Daily secondary to new-onset COVID-19 illness and risk for hypertension and worsening anxiety  - Monitor for adverse medication reactions       Medication management every 6 weeks  Referral for individual psychotherapy  Aware of 24 hour and weekend coverage for urgent situations accessed by calling [facility] main practice number    Medications Risks/Benefits      Risks, Benefits And Possible Side Effects Of Medications:    Risks, benefits, and possible side effects of medications explained to Keyla Calvillo including risk of suicidality and serotonin syndrome related to treatment with antidepressants, risks of misuse, abuse or dependence, sedation and respiratory depression related to treatment with benzodiazepine medications, risks of cardiovascular side effects including elevated blood pressure, risk of misuse, abuse or dependence and risk of increased anxiety related to treatment with stimulant medications and risk of impaired next-day mental alertness, complex sleep-related behavior and dependence related to treatment with hypnotic medications  She verbalizes understanding and agreement for treatment  Controlled Medication Discussion:     Kym Kt has been filling controlled prescriptions on time as prescribed according to [facility]  Discussed with Kym Meraz the risks of sedation, respiratory depression, impairment of ability to drive and potential for abuse and addiction related to treatment with benzodiazepine medications  She understands risk of treatment with benzodiazepine medications, agrees to not drive if feels impaired and agrees to take medications as prescribed    Psychotherapy Provided:     Individual psychotherapy provided: Yes  Counseling was provided during the session today for 16 minutes  Medications, treatment progress and treatment plan reviewed with Kym Meraz  Medication changes discussed with Kym Meraz  Medication education provided to Kym Meraz  Goals discussed during in session: alleviate anxiety, alleviate depression and improve sleep  Recent stressors discussed with Kym Meraz including COVID-19 issues and medical illness in family  Coping strategies reviewed with Kym Meraz  Educated on importance of medication and treatment compliance  Importance of follow up with family physician for medical issues reviewed with Kym Meraz  Supportive therapy provided        Treatment Plan:    Completed and signed during the session: Yes - Treatment Plan done but not signed at time of office visit due to:  Plan reviewed in person and verbal consent given due to Juan social distmarqiuta Stephens MD 01/28/21

## 2021-01-29 ENCOUNTER — TELEMEDICINE (OUTPATIENT)
Dept: INTERNAL MEDICINE CLINIC | Facility: CLINIC | Age: 61
End: 2021-01-29
Payer: COMMERCIAL

## 2021-01-29 ENCOUNTER — TELEPHONE (OUTPATIENT)
Dept: INTERNAL MEDICINE CLINIC | Facility: CLINIC | Age: 61
End: 2021-01-29

## 2021-01-29 ENCOUNTER — APPOINTMENT (EMERGENCY)
Dept: RADIOLOGY | Facility: HOSPITAL | Age: 61
End: 2021-01-29
Payer: COMMERCIAL

## 2021-01-29 ENCOUNTER — OFFICE VISIT (OUTPATIENT)
Dept: URGENT CARE | Age: 61
End: 2021-01-29
Payer: COMMERCIAL

## 2021-01-29 ENCOUNTER — HOSPITAL ENCOUNTER (EMERGENCY)
Facility: HOSPITAL | Age: 61
Discharge: HOME | End: 2021-01-29
Attending: EMERGENCY MEDICINE | Admitting: EMERGENCY MEDICINE
Payer: COMMERCIAL

## 2021-01-29 VITALS — TEMPERATURE: 96.8 F | OXYGEN SATURATION: 99 % | RESPIRATION RATE: 24 BRPM | HEART RATE: 89 BPM

## 2021-01-29 VITALS
DIASTOLIC BLOOD PRESSURE: 90 MMHG | HEART RATE: 75 BPM | RESPIRATION RATE: 26 BRPM | OXYGEN SATURATION: 97 % | SYSTOLIC BLOOD PRESSURE: 182 MMHG | TEMPERATURE: 98.4 F

## 2021-01-29 DIAGNOSIS — U07.1 COVID-19: ICD-10-CM

## 2021-01-29 DIAGNOSIS — R06.82 TACHYPNEA: ICD-10-CM

## 2021-01-29 DIAGNOSIS — R53.83 FATIGUE, UNSPECIFIED TYPE: ICD-10-CM

## 2021-01-29 DIAGNOSIS — R10.10 UPPER ABDOMINAL PAIN: ICD-10-CM

## 2021-01-29 DIAGNOSIS — R06.00 DYSPNEA: Primary | ICD-10-CM

## 2021-01-29 DIAGNOSIS — R06.00 DYSPNEA, UNSPECIFIED TYPE: Primary | ICD-10-CM

## 2021-01-29 DIAGNOSIS — U07.1 COVID-19: Primary | ICD-10-CM

## 2021-01-29 LAB
ALBUMIN SERPL BCP-MCNC: 3.6 G/DL (ref 3.5–5)
ALP SERPL-CCNC: 71 U/L (ref 46–116)
ALT SERPL W P-5'-P-CCNC: 41 U/L (ref 12–78)
ANION GAP SERPL CALCULATED.3IONS-SCNC: 10 MMOL/L (ref 4–13)
AST SERPL W P-5'-P-CCNC: 24 U/L (ref 5–45)
ATRIAL RATE: 58 BPM
BASOPHILS # BLD AUTO: 0.01 THOUSANDS/ΜL (ref 0–0.1)
BASOPHILS NFR BLD AUTO: 0 % (ref 0–1)
BILIRUB SERPL-MCNC: 0.16 MG/DL (ref 0.2–1)
BUN SERPL-MCNC: 22 MG/DL (ref 5–25)
CALCIUM SERPL-MCNC: 8.5 MG/DL (ref 8.3–10.1)
CHLORIDE SERPL-SCNC: 104 MMOL/L (ref 100–108)
CO2 SERPL-SCNC: 24 MMOL/L (ref 21–32)
CREAT SERPL-MCNC: 0.76 MG/DL (ref 0.6–1.3)
EOSINOPHIL # BLD AUTO: 0.13 THOUSAND/ΜL (ref 0–0.61)
EOSINOPHIL NFR BLD AUTO: 2 % (ref 0–6)
ERYTHROCYTE [DISTWIDTH] IN BLOOD BY AUTOMATED COUNT: 15.5 % (ref 11.6–15.1)
GFR SERPL CREATININE-BSD FRML MDRD: 84 ML/MIN/1.73SQ M
GLUCOSE SERPL-MCNC: 97 MG/DL (ref 65–140)
HCT VFR BLD AUTO: 41.9 % (ref 34.8–46.1)
HGB BLD-MCNC: 14 G/DL (ref 11.5–15.4)
IMM GRANULOCYTES # BLD AUTO: 0.01 THOUSAND/UL (ref 0–0.2)
IMM GRANULOCYTES NFR BLD AUTO: 0 % (ref 0–2)
LIPASE SERPL-CCNC: 477 U/L (ref 73–393)
LYMPHOCYTES # BLD AUTO: 2.96 THOUSANDS/ΜL (ref 0.6–4.47)
LYMPHOCYTES NFR BLD AUTO: 54 % (ref 14–44)
MCH RBC QN AUTO: 28.2 PG (ref 26.8–34.3)
MCHC RBC AUTO-ENTMCNC: 33.4 G/DL (ref 31.4–37.4)
MCV RBC AUTO: 84 FL (ref 82–98)
MONOCYTES # BLD AUTO: 0.47 THOUSAND/ΜL (ref 0.17–1.22)
MONOCYTES NFR BLD AUTO: 8 % (ref 4–12)
NEUTROPHILS # BLD AUTO: 2.02 THOUSANDS/ΜL (ref 1.85–7.62)
NEUTS SEG NFR BLD AUTO: 36 % (ref 43–75)
NRBC BLD AUTO-RTO: 0 /100 WBCS
P AXIS: 70 DEGREES
PLATELET # BLD AUTO: 189 THOUSANDS/UL (ref 149–390)
PMV BLD AUTO: 9.3 FL (ref 8.9–12.7)
POTASSIUM SERPL-SCNC: 4.2 MMOL/L (ref 3.5–5.3)
PR INTERVAL: 160 MS
PROT SERPL-MCNC: 7.3 G/DL (ref 6.4–8.2)
QRS AXIS: 84 DEGREES
QRSD INTERVAL: 90 MS
QT INTERVAL: 416 MS
QTC INTERVAL: 408 MS
RBC # BLD AUTO: 4.97 MILLION/UL (ref 3.81–5.12)
SODIUM SERPL-SCNC: 138 MMOL/L (ref 136–145)
T WAVE AXIS: 80 DEGREES
TROPONIN I SERPL-MCNC: <0.02 NG/ML
VENTRICULAR RATE: 58 BPM
WBC # BLD AUTO: 5.6 THOUSAND/UL (ref 4.31–10.16)

## 2021-01-29 PROCEDURE — 83690 ASSAY OF LIPASE: CPT | Performed by: EMERGENCY MEDICINE

## 2021-01-29 PROCEDURE — G0382 LEV 3 HOSP TYPE B ED VISIT: HCPCS | Performed by: PHYSICIAN ASSISTANT

## 2021-01-29 PROCEDURE — 85025 COMPLETE CBC W/AUTO DIFF WBC: CPT | Performed by: EMERGENCY MEDICINE

## 2021-01-29 PROCEDURE — 80053 COMPREHEN METABOLIC PANEL: CPT | Performed by: EMERGENCY MEDICINE

## 2021-01-29 PROCEDURE — 99285 EMERGENCY DEPT VISIT HI MDM: CPT

## 2021-01-29 PROCEDURE — 93010 ELECTROCARDIOGRAM REPORT: CPT | Performed by: INTERNAL MEDICINE

## 2021-01-29 PROCEDURE — 71045 X-RAY EXAM CHEST 1 VIEW: CPT

## 2021-01-29 PROCEDURE — 99203 OFFICE O/P NEW LOW 30 MIN: CPT | Performed by: PHYSICIAN ASSISTANT

## 2021-01-29 PROCEDURE — 99213 OFFICE O/P EST LOW 20 MIN: CPT | Performed by: NURSE PRACTITIONER

## 2021-01-29 PROCEDURE — 84484 ASSAY OF TROPONIN QUANT: CPT | Performed by: EMERGENCY MEDICINE

## 2021-01-29 PROCEDURE — 99285 EMERGENCY DEPT VISIT HI MDM: CPT | Performed by: EMERGENCY MEDICINE

## 2021-01-29 PROCEDURE — 99283 EMERGENCY DEPT VISIT LOW MDM: CPT | Performed by: PHYSICIAN ASSISTANT

## 2021-01-29 PROCEDURE — 93005 ELECTROCARDIOGRAM TRACING: CPT

## 2021-01-29 PROCEDURE — 36415 COLL VENOUS BLD VENIPUNCTURE: CPT | Performed by: EMERGENCY MEDICINE

## 2021-01-29 NOTE — PROGRESS NOTES
[address]        NAME: Sharon Moreno is a 56 y o  female  : 1960    MRN: [id number]  DATE: 2021  TIME: 1:07 PM    Assessment and Plan   Dyspnea, unspecified type [R06 00]  1  Dyspnea, unspecified type  Transfer to other facility   2  Upper abdominal pain  Transfer to other facility   3  Tachypnea  Transfer to other facility   4  Fatigue, unspecified type  Transfer to other facility     Fatigue, worsening shortness of breath, about 3 word dyspnea on exam, tachypnea, bilateral upper abdominal pain with nausea, denies vomiting or diarrhea, COVID positive  Pulse ox around 97 in the office, patient states he has been getting down to 91% at home  States she has to lift her arms above her head to breathe, feels she cannot catch her breath  Refusing ambulance transfer, she would like her  to take her via private vehicle today with [facility] ER    Patient Instructions     Patient declining ambulance  She like her  to take her via private vehicle to [facility] Emergency Department  Please go to the [facility] Emergency Department now for further evaluation and treatment- hospital address verified with the patient  Patient agreed to go immediately to the ED  Chief Complaint     Chief Complaint   Patient presents with    Shortness of Breath     pt is covid positive, states pulse ox dropped to 91% at home after walking up stairs, c/o worsening shortness of breath, sent in by PCP for respiratory clinic    Nausea     pt c/o waves on nausea, no vomiting         History of Present Illness       13-year-old female presents for Respiratory Clinic evaluation  States she started with fatigue, feeling hot/cold around  but denies any fevers  States she went to get COVID testing on Tuesday and it came back positive  She denies any cough but states she has been feeling worsening shortness of breath    States initially it was with walking, now it is even at rest   States she has been monitoring her pulse ox at home and has dropped to 91%  States she feels like she is having to work to breathe or sometimes she has to lift her arms up above her head to get a breath  She denies any chest pain or palpitations  He states he has also been having some bilateral upper abdominal pain, right side worse than the left with nausea for the past few days  She denies any vomiting or diarrhea  States she has not tried anything for it      Review of Systems   Review of Systems   Constitutional: Positive for chills, fatigue and fever  Negative for activity change and appetite change  HENT: Positive for congestion and rhinorrhea  Negative for ear pain, facial swelling, postnasal drip, sinus pressure, sore throat, trouble swallowing and voice change  Eyes: Negative for discharge, itching and visual disturbance  Respiratory: Positive for shortness of breath  Negative for cough, chest tightness and wheezing  Cardiovascular: Negative for chest pain and palpitations  Gastrointestinal: Positive for abdominal pain and nausea  Negative for diarrhea and vomiting  Musculoskeletal: Negative for back pain and neck pain  Skin: Negative for rash  Neurological: Negative for dizziness, syncope, weakness, numbness and headaches  All other systems reviewed and are negative          Current Medications       Current Outpatient Medications:     ALPRAZolam (XANAX) 1 mg tablet, Take 1/2 to 1 tab PO bid prn anxiety, Disp: 60 tablet, Rfl: 0    amphetamine-dextroamphetamine (ADDERALL XR) 20 MG 24 hr capsule, Take 1 capsule (20 mg total) by mouth every morningMax Daily Amount: 20 mg, Disp: 30 capsule, Rfl: 0    desvenlafaxine (PRISTIQ) 100 mg 24 hr tablet, Take 1 tablet (100 mg total) by mouth daily, Disp: 30 tablet, Rfl: 2    hydrOXYzine HCL (ATARAX) 25 mg tablet, Take 1 tablet (25 mg total) by mouth every 8 (eight) hours as needed for anxiety, Disp: 90 tablet, Rfl: 0   levothyroxine 25 mcg tablet, Take 1 tab x 5 days, 2 tabs x 2 days a week, Disp: 100 tablet, Rfl: 1    linaCLOtide (Linzess) 72 MCG CAPS, Take 1 tablet by mouth daily, Disp: 30 capsule, Rfl: 5    traZODone (DESYREL) 50 mg tablet, Take 1 tablet (50 mg total) by mouth daily at bedtime as needed for sleep, Disp: 30 tablet, Rfl: 1    Current Allergies     Allergies as of 2021    (No Known Allergies)            The following portions of the patient's history were reviewed and updated as appropriate: allergies, current medications, past family history, past medical history, past social history, past surgical history and problem list      Past Medical History:   Diagnosis Date    Anxiety     Anxiety     Colon polyp     Panic attacks        Past Surgical History:   Procedure Laterality Date     SECTION      COLONOSCOPY      HYSTERECTOMY         Family History   Problem Relation Age of Onset    Lung cancer Mother     Ovarian cancer Maternal Grandmother     Glaucoma Paternal Grandmother     Asthma Son     Alcohol abuse Neg Hx     Depression Neg Hx     Drug abuse Neg Hx     Substance Abuse Neg Hx          Medications have been verified  Objective   Pulse 89   Temp (!) 96 8 °F (36 °C)   Resp (!) 24   SpO2 99% Comment: 99% ambultory, 99% sitting       Physical Exam     Physical Exam  Vitals signs and nursing note reviewed  Constitutional:       General: She is not in acute distress  Appearance: She is well-developed  She is not toxic-appearing  Comments: About 3 word dyspnea   HENT:      Head: Normocephalic and atraumatic  Mouth/Throat:      Mouth: Mucous membranes are moist       Pharynx: Uvula midline  No uvula swelling  Neck:      Musculoskeletal: Normal range of motion and neck supple  Cardiovascular:      Rate and Rhythm: Normal rate and regular rhythm  Heart sounds: Normal heart sounds     Pulmonary:      Effort: Pulmonary effort is normal  Tachypnea present  No respiratory distress  Breath sounds: Normal breath sounds  No wheezing  Abdominal:      General: Bowel sounds are normal       Palpations: Abdomen is soft  Tenderness: There is abdominal tenderness in the right upper quadrant and left upper quadrant  There is no guarding  Skin:     Capillary Refill: Capillary refill takes less than 2 seconds  Neurological:      Mental Status: She is alert and oriented to person, place, and time     Psychiatric:         Behavior: Behavior normal

## 2021-01-29 NOTE — ED PROVIDER NOTES
Judith umaña  Chief Complaint   Patient presents with    Shortness of Breath     Pt presents to the ED with c/o SOB, chest pain, abdominal pain  Sent here from urgent care for further eval      HPI     Patient is 78-year-old female, sent from urgent care for further evaluation  Patient states that she called her doctor today to follow-up for known COVID-19 infection, was referred to Urgent Care for chest x-ray  Patient went to Urgent Care to obtain x-ray and was sent to the ER for further evaluation  Patient describes shortness of breath  Denies any significant chest pain  Reports diffuse body aches and diffuse abdominal pain  Denies any nausea vomiting, diarrhea  Reports aching vitamin regimen for COVID-19  Denies any hypoxia at home   sick with similar symptoms  Prior to Admission Medications   Prescriptions Last Dose Informant Patient Reported? Taking? ALPRAZolam (XANAX) 1 mg tablet 1/28/2021 at Unknown time  No Yes   Sig: Take 1/2 to 1 tab PO bid prn anxiety   amphetamine-dextroamphetamine (ADDERALL XR) 20 MG 24 hr capsule Not Taking at Unknown time Self No No   Sig: Take 1 capsule (20 mg total) by mouth every morningMax Daily Amount: 20 mg   Patient not taking: Reported on 1/29/2021   desvenlafaxine (PRISTIQ) 100 mg 24 hr tablet 1/29/2021 at Unknown time Self No Yes   Sig: Take 1 tablet (100 mg total) by mouth daily   hydrOXYzine HCL (ATARAX) 25 mg tablet Unknown at Unknown time  No No   Sig: Take 1 tablet (25 mg total) by mouth every 8 (eight) hours as needed for anxiety   levothyroxine 25 mcg tablet  Self No No   Sig: Take 1 tab x 5 days, 2 tabs x 2 days a week   Patient taking differently: 25 mcg Take 1 tab x 5 days, 2 tabs sat   Sun mon   linaCLOtide (Linzess) 72 MCG CAPS   No No   Sig: Take 1 tablet by mouth daily   traZODone (DESYREL) 50 mg tablet Unknown at Unknown time Self No No   Sig: Take 1 tablet (50 mg total) by mouth daily at bedtime as needed for sleep Facility-Administered Medications: None       Past Medical History:   Diagnosis Date    Anxiety     Anxiety     Colon polyp     Panic attacks        Past Surgical History:   Procedure Laterality Date     SECTION      COLONOSCOPY      HYSTERECTOMY         Family History   Problem Relation Age of Onset    Lung cancer Mother     Ovarian cancer Maternal Grandmother     Glaucoma Paternal Grandmother     Asthma Son     Alcohol abuse Neg Hx     Depression Neg Hx     Drug abuse Neg Hx     Substance Abuse Neg Hx      I have reviewed and agree with the history as documented  E-Cigarette/Vaping    E-Cigarette Use Never User      E-Cigarette/Vaping Substances    Nicotine No     THC No     CBD No     Flavoring No     Other No      Social History     Tobacco Use    Smoking status: Current Every Day Smoker     Packs/day: 1 00     Years: 40 00     Pack years: 40 00     Types: Cigarettes     Start date: 1980    Smokeless tobacco: Never Used    Tobacco comment: since age 23   Substance Use Topics    Alcohol use: Yes     Comment: Rare- 1 every few weeks     Drug use: No     Types: Cocaine     Comment: coccaine use  as per allscripts       Review of Systems   Respiratory: Positive for shortness of breath  All other systems reviewed and are negative  Physical Exam  Physical Exam  Vitals signs and nursing note reviewed  Constitutional:       General: She is not in acute distress  Appearance: She is well-developed  HENT:      Head: Normocephalic and atraumatic  Eyes:      Pupils: Pupils are equal, round, and reactive to light  Neck:      Musculoskeletal: Normal range of motion and neck supple  Cardiovascular:      Rate and Rhythm: Normal rate and regular rhythm  Heart sounds: Normal heart sounds  No murmur  Pulmonary:      Effort: Pulmonary effort is normal  No respiratory distress  Breath sounds: Normal breath sounds  No wheezing or rales     Abdominal: General: Bowel sounds are normal  There is no distension  Palpations: Abdomen is soft  Tenderness: There is no abdominal tenderness  There is no guarding or rebound  Musculoskeletal: Normal range of motion  General: No deformity  Lymphadenopathy:      Cervical: No cervical adenopathy  Skin:     Capillary Refill: Capillary refill takes less than 2 seconds  Findings: No erythema or rash  Neurological:      Mental Status: She is alert and oriented to person, place, and time  Cranial Nerves: No cranial nerve deficit  Motor: No abnormal muscle tone        Coordination: Coordination normal    Psychiatric:         Behavior: Behavior normal          Vital Signs  ED Triage Vitals [01/29/21 1344]   Temperature Pulse Respirations Blood Pressure SpO2   98 4 °F (36 9 °C) 75 (!) 26 (!) 182/90 97 %      Temp Source Heart Rate Source Patient Position - Orthostatic VS BP Location FiO2 (%)   Oral Monitor -- Right arm --      Pain Score       --           Vitals:    01/29/21 1344   BP: (!) 182/90   Pulse: 75         Visual Acuity      ED Medications  Medications - No data to display    Diagnostic Studies  Results Reviewed     Procedure Component Value Units Date/Time    Comprehensive metabolic panel [[id number]]  (Abnormal) Collected: 01/29/21 1427    Lab Status: Final result Specimen: Blood from Arm, Right Updated: 01/29/21 1509     Sodium 138 mmol/L      Potassium 4 2 mmol/L      Chloride 104 mmol/L      CO2 24 mmol/L      ANION GAP 10 mmol/L      BUN 22 mg/dL      Creatinine 0 76 mg/dL      Glucose 97 mg/dL      Calcium 8 5 mg/dL      AST 24 U/L      ALT 41 U/L      Alkaline Phosphatase 71 U/L      Total Protein 7 3 g/dL      Albumin 3 6 g/dL      Total Bilirubin 0 16 mg/dL      eGFR 84 ml/min/1 73sq m     Narrative:      [place] guidelines for Chronic Kidney Disease (CKD):     Stage 1 with normal or high GFR (GFR > 90 mL/min/1 73 square meters)    Stage 2 Mild CKD (GFR = 60-89 mL/min/1 73 square meters)    Stage 3A Moderate CKD (GFR = 45-59 mL/min/1 73 square meters)    Stage 3B Moderate CKD (GFR = 30-44 mL/min/1 73 square meters)    Stage 4 Severe CKD (GFR = 15-29 mL/min/1 73 square meters)    Stage 5 End Stage CKD (GFR <15 mL/min/1 73 square meters)  Note: GFR calculation is accurate only with a steady state creatinine    Lipase [[id number]]  (Abnormal) Collected: 01/29/21 1427    Lab Status: Final result Specimen: Blood from Arm, Right Updated: 01/29/21 1509     Lipase 477 u/L     Troponin I [[id number]]  (Normal) Collected: 01/29/21 1427    Lab Status: Final result Specimen: Blood from Arm, Right Updated: 01/29/21 1509     Troponin I <0 02 ng/mL     CBC and differential [[id number]]  (Abnormal) Collected: 01/29/21 1427    Lab Status: Final result Specimen: Blood from Arm, Right Updated: 01/29/21 1452     WBC 5 60 Thousand/uL      RBC 4 97 Million/uL      Hemoglobin 14 0 g/dL      Hematocrit 41 9 %      MCV 84 fL      MCH 28 2 pg      MCHC 33 4 g/dL      RDW 15 5 %      MPV 9 3 fL      Platelets 822 Thousands/uL      nRBC 0 /100 WBCs      Neutrophils Relative 36 %      Immat GRANS % 0 %      Lymphocytes Relative 54 %      Monocytes Relative 8 %      Eosinophils Relative 2 %      Basophils Relative 0 %      Neutrophils Absolute 2 02 Thousands/µL      Immature Grans Absolute 0 01 Thousand/uL      Lymphocytes Absolute 2 96 Thousands/µL      Monocytes Absolute 0 47 Thousand/µL      Eosinophils Absolute 0 13 Thousand/µL      Basophils Absolute 0 01 Thousands/µL                  XR chest 1 view portable   Final Result by Nicholas Hastings MD (01/29 1501)      No acute cardiopulmonary disease                    Workstation performed: [id number]                    Procedures  ECG 12 Lead Documentation Only    Date/Time: 1/29/2021 3:31 PM  Performed by: Ángel Bee MD  Authorized by: Ángel Bee MD     Indications / Diagnosis:  Shortness of breath, COVID-19  Patient location:  ED  Rate:     ECG rate:  58    ECG rate assessment: bradycardic    Rhythm:     Rhythm: sinus bradycardia    Ectopy:     Ectopy: none    QRS:     QRS intervals:  Normal  Conduction:     Conduction: normal    ST segments:     ST segments:  Normal  T waves:     T waves: normal               ED Course                                           MDM  Number of Diagnoses or Management Options  COVID-19: new and requires workup  Dyspnea: new and requires workup  Diagnosis management comments: Known COVID-19 positive here with of breath  Patient also with diffuse body aches, diffuse abdominal pain  No hypoxia  No significant chest pain  EKG with no acute ischemic findings  Troponin negative  Lipase slightly elevated, nonspecific in setting of known COVID-19 infection  No severe pain radiating to the back, doubt acute pancreatitis, lipase less than 3 times the upper limit of normal, no additional imaging indicated  Patient re-evaluated  Stable on room air  Discharge home  Has a pulse ox at home that she can check and return to ER if pulse ox persistently below 92%  PCP follow-up recommended  Patient taking regimen of vitamin-C/vitamin-D/multivitamin         Amount and/or Complexity of Data Reviewed  Clinical lab tests: ordered and reviewed  Tests in the radiology section of CPT®: reviewed and ordered  Tests in the medicine section of CPT®: ordered and reviewed    Risk of Complications, Morbidity, and/or Mortality  Presenting problems: high  Diagnostic procedures: moderate  Management options: high    Patient Progress  Patient progress: stable      Disposition  Final diagnoses:   Dyspnea   COVID-19     Time reflects when diagnosis was documented in both MDM as applicable and the Disposition within this note     Time User Action Codes Description Comment    1/29/2021  3:25 PM Ned Brower Add [R06 00] Dyspnea     1/29/2021  3:25 PM Ned Browre Add [U07 1] COVID-19       ED Disposition     ED Disposition Condition Date/Time Comment    Discharge Stable Fri Jan 29, 2021  3:25 PM Glorious Soho discharge to home/self care  Follow-up Information     Follow up With Specialties Details Why Contact Info Additional Information    Gladys Buck MD Internal Medicine Call in 1 day To discuss any outpatient regimens that would be recommended [address] [facility]  [id number] [place] [address] [address] [facility] Emergency Department Emergency Medicine Go to  If symptoms worsen [address] Emergency Department,  [address], [place], [place], [facility]          Patient's Medications   Discharge Prescriptions    No medications on file     No discharge procedures on file      PDMP Review       Value Time User    PDMP Reviewed  Yes 1/5/2021 12:28 PM Gladsy Buck MD          ED Provider  Electronically Signed by           Po Alves MD  01/29/21 0965

## 2021-01-29 NOTE — TELEPHONE ENCOUNTER
Liza Mcelroy wanted to let Aida Srinivasan know Horatio Joe is in the ER     They took her to the Respiratory clinic and they told her to go right to the ER

## 2021-01-29 NOTE — DISCHARGE INSTRUCTIONS
Please continue vitamin regimen as recommended by your outpatient physicians      Please check pulse ox and return to ER should your pulse ox persistently be below 92%

## 2021-01-29 NOTE — PATIENT INSTRUCTIONS
Patient declining ambulance  She like her  to take her via private vehicle to [facility] Emergency Department  Please go to the [facility] Emergency Department now for further evaluation and treatment- hospital address verified with the patient  Patient agreed to go immediately to the ED

## 2021-01-29 NOTE — PROGRESS NOTES
COVID-19 Virtual Visit     Assessment/Plan:    Problem List Items Addressed This Visit        Other    COVID-19 - Primary     Will schedule with the respiratory clinic today due to worsening shortness of breath  Disposition:     I recommended continued isolation until at least 24 hours have passed since recovery defined as resolution of fever without the use of fever-reducing medications AND improvement in COVID symptoms AND 10 days have passed since onset of symptoms (or 10 days have passed since date of first positive viral diagnostic test for asymptomatic patients)  I have spent 6 minutes directly with the patient  Greater than 50% of this time was spent in counseling/coordination of care regarding: instructions for management and patient and family education  Encounter provider KEATON Suresh    Provider located at [address] [place] [id number]    Recent Visits  Date Type Provider Dept   01/27/21 Telephone Akiko Stanford, [address] [facility] Internal Med   01/27/21 [address], [address] Internal Med   01/25/21 Telemedicine Akiko Stanford MD [facility] Internal Med   Showing recent visits within past 7 days and meeting all other requirements     Today's Visits  Date Type Provider Dept   01/29/21 [address], [address] Internal Med   Showing today's visits and meeting all other requirements     Future Appointments  No visits were found meeting these conditions  Showing future appointments within next 150 days and meeting all other requirements      This virtual check-in was done via [employer] and patient was informed that this is a secure, HIPAA-compliant platform  She agrees to proceed      Patient agrees to participate in a virtual check in via telephone or video visit instead of presenting to the office to address urgent/immediate medical needs  Patient is aware this is a billable service  After connecting through [facility], the patient was identified by name and date of birth  Adrián Roe was informed that this was a telemedicine visit and that the exam was being conducted confidentially over secure lines  My office door was closed  No one else was in the room  Adrián Roe acknowledged consent and understanding of privacy and security of the telemedicine visit  I informed the patient that I have reviewed her record in Epic and presented the opportunity for her to ask any questions regarding the visit today  The patient agreed to participate  Subjective:   Adrián Roe is a 56 y o  female who has been screened for COVID-19  Symptom change since last report: worsening  Patient's symptoms include fatigue, malaise, anosmia, loss of taste, shortness of breath, chest tightness, nausea and myalgias  Patient denies fever, chills, congestion, sore throat, cough, vomiting, diarrhea and headaches  Pauline Sweet has been staying home and has isolated themselves in her home  She is taking care to not share personal items and is cleaning all surfaces that are touched often, like counters, tabletops, and doorknobs using household cleaning sprays or wipes  She is wearing a mask when she leaves her room  Date of symptom onset: 2021  Date of exposure: 2021  Date of positive COVID-19 PCR: 2021    Pauline Sweet feels worse today  She feels more shortness of breath  She's having to put her arms above her head to get a good breath  Her pulse ox has been >90%  She has waves of nausea and continues to feel fatigue and weak       Lab Results   Component Value Date    SARSCOV2 Positive (A) 2021     Past Medical History:   Diagnosis Date    Anxiety     Anxiety     Colon polyp     Panic attacks      Past Surgical History:   Procedure Laterality Date     SECTION      COLONOSCOPY      HYSTERECTOMY       Current Outpatient Medications   Medication Sig Dispense Refill    ALPRAZolam (XANAX) 1 mg tablet Take 1/2 to 1 tab PO bid prn anxiety 60 tablet 0    amphetamine-dextroamphetamine (ADDERALL XR) 20 MG 24 hr capsule Take 1 capsule (20 mg total) by mouth every morningMax Daily Amount: 20 mg 30 capsule 0    desvenlafaxine (PRISTIQ) 100 mg 24 hr tablet Take 1 tablet (100 mg total) by mouth daily 30 tablet 2    hydrOXYzine HCL (ATARAX) 25 mg tablet Take 1 tablet (25 mg total) by mouth every 8 (eight) hours as needed for anxiety 90 tablet 0    levothyroxine 25 mcg tablet Take 1 tab x 5 days, 2 tabs x 2 days a week 100 tablet 1    linaCLOtide (Linzess) 72 MCG CAPS Take 1 tablet by mouth daily 30 capsule 5    traZODone (DESYREL) 50 mg tablet Take 1 tablet (50 mg total) by mouth daily at bedtime as needed for sleep 30 tablet 1     No current facility-administered medications for this visit  No Known Allergies    Review of Systems   Constitutional: Positive for fatigue  Negative for chills and fever  HENT: Negative for congestion and sore throat  Respiratory: Positive for chest tightness and shortness of breath  Negative for cough  Gastrointestinal: Positive for nausea  Negative for diarrhea and vomiting  Musculoskeletal: Positive for myalgias  Neurological: Negative for headaches  Objective: There were no vitals filed for this visit  Physical Exam  Constitutional:       Appearance: She is well-developed  HENT:      Head: Normocephalic  Eyes:      Pupils: Pupils are equal, round, and reactive to light  Pulmonary:      Effort: Pulmonary effort is normal    Neurological:      Mental Status: She is alert  Psychiatric:         Behavior: Behavior normal        VIRTUAL VISIT DISCLAIMER    Heaven Kelly acknowledges that she has consented to an online visit or consultation   She understands that the online visit is based solely on information provided by her, and that, in the absence of a face-to-face physical evaluation by the physician, the diagnosis she receives is both limited and provisional in terms of accuracy and completeness  This is not intended to replace a full medical face-to-face evaluation by the physician  Ana Ott understands and accepts these terms

## 2021-02-01 ENCOUNTER — TELEMEDICINE (OUTPATIENT)
Dept: INTERNAL MEDICINE CLINIC | Facility: CLINIC | Age: 61
End: 2021-02-01
Payer: COMMERCIAL

## 2021-02-01 DIAGNOSIS — F90.2 ADHD (ATTENTION DEFICIT HYPERACTIVITY DISORDER), COMBINED TYPE: ICD-10-CM

## 2021-02-01 DIAGNOSIS — U07.1 COVID-19: Primary | ICD-10-CM

## 2021-02-01 PROCEDURE — 99213 OFFICE O/P EST LOW 20 MIN: CPT | Performed by: NURSE PRACTITIONER

## 2021-02-01 RX ORDER — DEXTROAMPHETAMINE SACCHARATE, AMPHETAMINE ASPARTATE MONOHYDRATE, DEXTROAMPHETAMINE SULFATE AND AMPHETAMINE SULFATE 5; 5; 5; 5 MG/1; MG/1; MG/1; MG/1
CAPSULE, EXTENDED RELEASE ORAL
Qty: 30 CAPSULE | OUTPATIENT
Start: 2021-02-01

## 2021-02-01 NOTE — PROGRESS NOTES
COVID-19 Virtual Visit     Assessment/Plan:    Problem List Items Addressed This Visit        Other    COVID-19 - Primary     Symptoms slowly improving  Continue self quarantine and symptomatic treatment  Stay well hydrated  Disposition:     I recommended continued isolation until at least 24 hours have passed since recovery defined as resolution of fever without the use of fever-reducing medications AND improvement in COVID symptoms AND 10 days have passed since onset of symptoms (or 10 days have passed since date of first positive viral diagnostic test for asymptomatic patients)  I have spent 8 minutes directly with the patient  Greater than 50% of this time was spent in counseling/coordination of care regarding: instructions for management and patient and family education  Encounter provider KEATON Francis    Provider located at [address] [place] [phone]    Recent Visits  Date Type Provider Dept   01/29/21 Telephone 2520 Racheal Myres Internal Med   01/29/21 [facility], [address],[facility] 620 Internal Med   01/27/21 Telephone MD Abhishek Espinosa Internal Med   01/27/21 [facility] [address] Internal Med   01/25/21 Telemedicine MD Abhishek Espinosa Internal [facility]   Showing recent visits within past 7 days and meeting all other requirements     Today's Visits  Date Type Provider Dept   02/01/21 Telemedicine Mariela Cooley, [address] Internal [facility]   Showing today's visits and meeting all other requirements     Future Appointments  No visits were found meeting these conditions  Showing future appointments within next 150 days and meeting all other requirements      This virtual check-in was done via [employer] and patient was informed that this is a secure, HIPAA-compliant platform   She agrees to proceed  Patient agrees to participate in a virtual check in via telephone or video visit instead of presenting to the office to address urgent/immediate medical needs  Patient is aware this is a billable service  After connecting through [facility], the patient was identified by name and date of birth  Shonna Fernandes was informed that this was a telemedicine visit and that the exam was being conducted confidentially over secure lines  My office door was closed  No one else was in the room  Shonna Fernandes acknowledged consent and understanding of privacy and security of the telemedicine visit  I informed the patient that I have reviewed her record in Epic and presented the opportunity for her to ask any questions regarding the visit today  The patient agreed to participate  Subjective:   Shonna Fernandes is a 56 y o  female who has been screened for COVID-19  Symptom change since last report: improving  Patient's symptoms include chills, anosmia, loss of taste, shortness of breath, abdominal pain, diarrhea and headache  Patient denies fever, fatigue, malaise, cough, chest tightness, nausea, vomiting and myalgias  Tati Rushing has been staying home and has isolated themselves in her home  She is taking care to not share personal items and is cleaning all surfaces that are touched often, like counters, tabletops, and doorknobs using household cleaning sprays or wipes  She is wearing a mask when she leaves her room  Date of symptom onset: 1/23/2021  Date of exposure: 1/22/2021  Date of positive COVID-19 PCR: 1/26/2021    Tati Rushing was seen in the ER on 1/29 due to worsening shortness of breath  Her chest xray and ekg were unremarkable  Her pulse ox was stable  Her labs were normal other than mildly elevated lipase  Today, Tati Rushing is feeling better  She reports a mild headache today and diarrhea  Her body aches and fatigue have improved  Her cough has mostly resolved   She still has occasional abdominal pain but denies vomiting  Her pulse ox has been >90%  Started with diarrhea  Headache today  Body aches are better  Breathing is improving  Still has occasional abdominal pain  Nausea has improved  Pulse ox has been >90%     She believes she had covid last December  She lost her sense of taste and smell  Lab Results   Component Value Date    SARSCOV2 Positive (A) 2021     Past Medical History:   Diagnosis Date    Anxiety     Anxiety     Colon polyp     Panic attacks      Past Surgical History:   Procedure Laterality Date     SECTION  1984    COLONOSCOPY      HYSTERECTOMY       Current Outpatient Medications   Medication Sig Dispense Refill    ALPRAZolam (XANAX) 1 mg tablet Take 1/2 to 1 tab PO bid prn anxiety 60 tablet 0    amphetamine-dextroamphetamine (ADDERALL XR) 20 MG 24 hr capsule Take 1 capsule (20 mg total) by mouth every morningMax Daily Amount: 20 mg (Patient not taking: Reported on 2021) 30 capsule 0    desvenlafaxine (PRISTIQ) 100 mg 24 hr tablet Take 1 tablet (100 mg total) by mouth daily 30 tablet 2    hydrOXYzine HCL (ATARAX) 25 mg tablet Take 1 tablet (25 mg total) by mouth every 8 (eight) hours as needed for anxiety 90 tablet 0    levothyroxine 25 mcg tablet Take 1 tab x 5 days, 2 tabs x 2 days a week (Patient taking differently: 25 mcg Take 1 tab x 5 days, 2 tabs sat  Sun mon) 100 tablet 1    linaCLOtide (Linzess) 72 MCG CAPS Take 1 tablet by mouth daily 30 capsule 5    traZODone (DESYREL) 50 mg tablet Take 1 tablet (50 mg total) by mouth daily at bedtime as needed for sleep 30 tablet 1     No current facility-administered medications for this visit  No Known Allergies    Review of Systems   Constitutional: Positive for chills  Negative for fatigue and fever  Respiratory: Positive for shortness of breath  Negative for cough and chest tightness  Gastrointestinal: Positive for abdominal pain and diarrhea  Negative for nausea and vomiting     Musculoskeletal: Negative for myalgias  Neurological: Positive for headaches  Objective: There were no vitals filed for this visit  Physical Exam  Constitutional:       Appearance: She is well-developed  HENT:      Head: Normocephalic  Eyes:      Pupils: Pupils are equal, round, and reactive to light  Pulmonary:      Effort: Pulmonary effort is normal    Neurological:      Mental Status: She is alert  Psychiatric:         Behavior: Behavior normal        VIRTUAL VISIT DISCLAIMER    Emily Delbert acknowledges that she has consented to an online visit or consultation  She understands that the online visit is based solely on information provided by her, and that, in the absence of a face-to-face physical evaluation by the physician, the diagnosis she receives is both limited and provisional in terms of accuracy and completeness  This is not intended to replace a full medical face-to-face evaluation by the physician  Emily Mandujano understands and accepts these terms

## 2021-02-04 ENCOUNTER — TELEMEDICINE (OUTPATIENT)
Dept: INTERNAL MEDICINE CLINIC | Facility: CLINIC | Age: 61
End: 2021-02-04
Payer: COMMERCIAL

## 2021-02-04 DIAGNOSIS — U07.1 COVID-19: Primary | ICD-10-CM

## 2021-02-04 DIAGNOSIS — R74.8 ELEVATED LIPASE: ICD-10-CM

## 2021-02-04 PROCEDURE — 99213 OFFICE O/P EST LOW 20 MIN: CPT | Performed by: NURSE PRACTITIONER

## 2021-02-04 NOTE — PROGRESS NOTES
COVID-19 Virtual Visit     Assessment/Plan:    Problem List Items Addressed This Visit        Other    COVID-19 - Primary     Symptoms continue to improve  Advised to go to the ER if she is having any worsening epigastric/back pain, nausea or vomiting  Lipase was mildly elevated last week, will recheck this  Relevant Orders    Lipase      Other Visit Diagnoses     Elevated lipase        Relevant Orders    Lipase         Disposition:     I recommended continued isolation until at least 24 hours have passed since recovery defined as resolution of fever without the use of fever-reducing medications AND improvement in COVID symptoms AND 10 days have passed since onset of symptoms (or 10 days have passed since date of first positive viral diagnostic test for asymptomatic patients)  I have spent 10 minutes directly with the patient  Greater than 50% of this time was spent in counseling/coordination of care regarding: risks and benefits of treatment options and instructions for management  Encounter provider KEATON Humphrey    Provider located at [address] [place] [phone]    Recent Visits  Date Type Provider Dept   02/01/21 Telemedicine Vasquez Nieto, [address] [place] [address],[facility] 620 Internal Med   01/29/21 Telephone [address] Internal Med   01/29/21 Telemedicine Vasquez Nieto, [address] Internal Med   Showing recent visits within past 7 days and meeting all other requirements     Today's Visits  Date Type Provider Dept   02/04/21 [facility], [address] Internal Med   Showing today's visits and meeting all other requirements     Future Appointments  No visits were found meeting these conditions     Showing future appointments within next 150 days and meeting all other requirements      This virtual check-in was done via [employer] and patient was informed that this is a secure, HIPAA-compliant platform  She agrees to proceed  Patient agrees to participate in a virtual check in via telephone or video visit instead of presenting to the office to address urgent/immediate medical needs  Patient is aware this is a billable service  After connecting through [facility], the patient was identified by name and date of birth  Latonya Andino was informed that this was a telemedicine visit and that the exam was being conducted confidentially over secure lines  My office door was closed  No one else was in the room  Latonya Andino acknowledged consent and understanding of privacy and security of the telemedicine visit  I informed the patient that I have reviewed her record in Epic and presented the opportunity for her to ask any questions regarding the visit today  The patient agreed to participate  Subjective:   Latonya Andino is a 56 y o  female who has been screened for COVID-19  Symptom change since last report: resolving  Patient's symptoms include abdominal pain and nausea  Patient denies fever, chills, fatigue, anosmia, loss of taste, cough, shortness of breath, chest tightness, vomiting, diarrhea, myalgias and headaches  Ariel Kim has been staying home and has isolated themselves in her home  She is taking care to not share personal items and is cleaning all surfaces that are touched often, like counters, tabletops, and doorknobs using household cleaning sprays or wipes  She is wearing a mask when she leaves her room  Date of symptom onset: 1/23/2021  Date of exposure: 1/22/2021  Date of positive COVID-19 PCR: 1/26/2021    Ariel Kim is feeling somewhat better  Her diarrhea has subsided and her shortness of breath have improved  However she continues to have waves of epigastric pain that radiates to her back  The pain is getting a little better each day  She has some nausea but no vomiting  She has been able to eat more  Her pulse ox is 98%             Lab Results Component Value Date    SARSCOV2 Positive (A) 2021     Past Medical History:   Diagnosis Date    Anxiety     Anxiety     Colon polyp     Panic attacks      Past Surgical History:   Procedure Laterality Date     SECTION  1984    COLONOSCOPY      HYSTERECTOMY       Current Outpatient Medications   Medication Sig Dispense Refill    ALPRAZolam (XANAX) 1 mg tablet Take 1/2 to 1 tab PO bid prn anxiety 60 tablet 0    amphetamine-dextroamphetamine (ADDERALL XR) 20 MG 24 hr capsule Take 1 capsule (20 mg total) by mouth every morningMax Daily Amount: 20 mg (Patient not taking: Reported on 2021) 30 capsule 0    desvenlafaxine (PRISTIQ) 100 mg 24 hr tablet Take 1 tablet (100 mg total) by mouth daily 30 tablet 2    hydrOXYzine HCL (ATARAX) 25 mg tablet Take 1 tablet (25 mg total) by mouth every 8 (eight) hours as needed for anxiety 90 tablet 0    levothyroxine 25 mcg tablet Take 1 tab x 5 days, 2 tabs x 2 days a week (Patient taking differently: 25 mcg Take 1 tab x 5 days, 2 tabs sat  Sun mon) 100 tablet 1    linaCLOtide (Linzess) 72 MCG CAPS Take 1 tablet by mouth daily 30 capsule 5    traZODone (DESYREL) 50 mg tablet Take 1 tablet (50 mg total) by mouth daily at bedtime as needed for sleep 30 tablet 1     No current facility-administered medications for this visit  No Known Allergies    Review of Systems   Constitutional: Negative for chills, fatigue and fever  Respiratory: Negative for cough, chest tightness and shortness of breath  Gastrointestinal: Positive for abdominal pain and nausea  Negative for diarrhea and vomiting  Musculoskeletal: Negative for myalgias  Neurological: Negative for headaches  Objective: There were no vitals filed for this visit  Physical Exam  Constitutional:       Appearance: She is well-developed  HENT:      Head: Normocephalic  Eyes:      Pupils: Pupils are equal, round, and reactive to light     Pulmonary:      Effort: Pulmonary effort is normal    Neurological:      Mental Status: She is alert  Psychiatric:         Behavior: Behavior normal        VIRTUAL VISIT DISCLAIMER    Josefadante Aneudy acknowledges that she has consented to an online visit or consultation  She understands that the online visit is based solely on information provided by her, and that, in the absence of a face-to-face physical evaluation by the physician, the diagnosis she receives is both limited and provisional in terms of accuracy and completeness  This is not intended to replace a full medical face-to-face evaluation by the physician  Jimmie Amado understands and accepts these terms

## 2021-02-04 NOTE — ASSESSMENT & PLAN NOTE
Symptoms continue to improve  Advised to go to the ER if she is having any worsening epigastric/back pain, nausea or vomiting  Lipase was mildly elevated last week, will recheck this

## 2021-02-08 ENCOUNTER — TELEPHONE (OUTPATIENT)
Dept: ENDOCRINOLOGY | Facility: CLINIC | Age: 61
End: 2021-02-08

## 2021-02-08 ENCOUNTER — TELEMEDICINE (OUTPATIENT)
Dept: INTERNAL MEDICINE CLINIC | Facility: CLINIC | Age: 61
End: 2021-02-08
Payer: COMMERCIAL

## 2021-02-08 DIAGNOSIS — F41.9 ANXIETY: ICD-10-CM

## 2021-02-08 DIAGNOSIS — R74.8 ELEVATED LIPASE: ICD-10-CM

## 2021-02-08 DIAGNOSIS — U07.1 COVID-19: Primary | ICD-10-CM

## 2021-02-08 PROCEDURE — 99212 OFFICE O/P EST SF 10 MIN: CPT | Performed by: NURSE PRACTITIONER

## 2021-02-08 RX ORDER — TRAZODONE HYDROCHLORIDE 100 MG/1
TABLET ORAL
Qty: 90 TABLET | Refills: 1 | OUTPATIENT
Start: 2021-02-08

## 2021-02-08 NOTE — PROGRESS NOTES
COVID-19 Virtual Visit     Assessment/Plan:    Problem List Items Addressed This Visit        Other    COVID-19 - Primary     Symptoms resolved  Ok to recheck lipase  Off self quarantine  Other Visit Diagnoses     Elevated lipase        Relevant Orders    Lipase         Disposition:     I recommended continued isolation until at least 24 hours have passed since recovery defined as resolution of fever without the use of fever-reducing medications AND improvement in COVID symptoms AND 10 days have passed since onset of symptoms (or 10 days have passed since date of first positive viral diagnostic test for asymptomatic patients)  I have spent 5 minutes directly with the patient  Greater than 50% of this time was spent in counseling/coordination of care regarding: patient and family education  Encounter provider KEATON León    Provider located at [address] [place] [phone]    Recent Visits  Date Type Provider Dept   02/04/21 Telemedicine Jeff Elam, [address] Internal Med   02/01/21 [facility] 123, [address],[facility] 620 Internal [facility]   Showing recent visits within past 7 days and meeting all other requirements     Today's Visits  Date Type Provider Dept   02/08/21 [facility] 123, [address] Internal Med   Showing today's visits and meeting all other requirements     Future Appointments  No visits were found meeting these conditions  Showing future appointments within next 150 days and meeting all other requirements      This virtual check-in was done via [employer] and patient was informed that this is a secure, HIPAA-compliant platform  She agrees to proceed  Patient agrees to participate in a virtual check in via telephone or video visit instead of presenting to the office to address urgent/immediate medical needs   Patient is aware this is a billable service  After connecting through [facility], the patient was identified by name and date of birth  Jc Rockwell was informed that this was a telemedicine visit and that the exam was being conducted confidentially over secure lines  My office door was closed  No one else was in the room  Jc Rockwell acknowledged consent and understanding of privacy and security of the telemedicine visit  I informed the patient that I have reviewed her record in Epic and presented the opportunity for her to ask any questions regarding the visit today  The patient agreed to participate  Subjective:   Jc Rockwell is a 56 y o  female who has been screened for COVID-19  Symptom change since last report: resolving  Patient is currently asymptomatic  Patient denies fever, chills, fatigue, malaise, congestion, rhinorrhea, sore throat, anosmia, loss of taste, cough, shortness of breath, chest tightness, abdominal pain, nausea, vomiting, diarrhea, myalgias and headaches  Kym Meraz has been staying home and has isolated themselves in her home  She is taking care to not share personal items and is cleaning all surfaces that are touched often, like counters, tabletops, and doorknobs using household cleaning sprays or wipes  She is wearing a mask when she leaves her room  Date of symptom onset: 2021  Date of exposure: 2021  Date of positive COVID-19 PCR: 2021    Kym Meraz is feeling much better yesterday and today  The abdominal pain is subsiding  Her shortness of breath has resolved   She has been eating and drinking normally             Lab Results   Component Value Date    SARSCOV2 Positive (A) 2021     Past Medical History:   Diagnosis Date    Anxiety     Anxiety     Colon polyp     Panic attacks      Past Surgical History:   Procedure Laterality Date     SECTION  1984    COLONOSCOPY      HYSTERECTOMY       Current Outpatient Medications   Medication Sig Dispense Refill    ALPRAZolam Jaceyndalybeba Murray) 1 mg tablet Take 1/2 to 1 tab PO bid prn anxiety 60 tablet 0    amphetamine-dextroamphetamine (ADDERALL XR) 20 MG 24 hr capsule Take 1 capsule (20 mg total) by mouth every morningMax Daily Amount: 20 mg (Patient not taking: Reported on 1/29/2021) 30 capsule 0    desvenlafaxine (PRISTIQ) 100 mg 24 hr tablet Take 1 tablet (100 mg total) by mouth daily 30 tablet 2    hydrOXYzine HCL (ATARAX) 25 mg tablet Take 1 tablet (25 mg total) by mouth every 8 (eight) hours as needed for anxiety 90 tablet 0    levothyroxine 25 mcg tablet Take 1 tab x 5 days, 2 tabs x 2 days a week (Patient taking differently: 25 mcg Take 1 tab x 5 days, 2 tabs sat  Sun mon) 100 tablet 1    linaCLOtide (Linzess) 72 MCG CAPS Take 1 tablet by mouth daily 30 capsule 5    traZODone (DESYREL) 50 mg tablet Take 1 tablet (50 mg total) by mouth daily at bedtime as needed for sleep 30 tablet 1     No current facility-administered medications for this visit  No Known Allergies    Review of Systems   Constitutional: Negative for chills, fatigue and fever  HENT: Negative for congestion, rhinorrhea and sore throat  Respiratory: Negative for cough, chest tightness and shortness of breath  Gastrointestinal: Negative for abdominal pain, diarrhea, nausea and vomiting  Musculoskeletal: Negative for myalgias  Neurological: Negative for headaches  Objective: There were no vitals filed for this visit  Physical Exam  Constitutional:       Appearance: She is well-developed  HENT:      Head: Normocephalic  Eyes:      Pupils: Pupils are equal, round, and reactive to light  Pulmonary:      Effort: Pulmonary effort is normal    Neurological:      Mental Status: She is alert  Psychiatric:         Behavior: Behavior normal        VIRTUAL VISIT DISCLAIMER    Heaven Kelly acknowledges that she has consented to an online visit or consultation   She understands that the online visit is based solely on information provided by her, and that, in the absence of a face-to-face physical evaluation by the physician, the diagnosis she receives is both limited and provisional in terms of accuracy and completeness  This is not intended to replace a full medical face-to-face evaluation by the physician  Avinash Watson understands and accepts these terms

## 2021-02-08 NOTE — TELEPHONE ENCOUNTER
She is a new patient   I do not order anything until patients are seen and established in the practice

## 2021-02-08 NOTE — TELEPHONE ENCOUNTER
Patient left a message  She has an appointment scheduled for 2/10 and is having some blood work done tomorrow for her pcp  She would like to know if there are any tests that you would like her to have done for before her appointment so she can get them all done tomorrow

## 2021-02-09 ENCOUNTER — LAB (OUTPATIENT)
Dept: LAB | Facility: CLINIC | Age: 61
End: 2021-02-09
Payer: COMMERCIAL

## 2021-02-09 DIAGNOSIS — U07.1 COVID-19: ICD-10-CM

## 2021-02-09 DIAGNOSIS — R74.8 ELEVATED LIPASE: ICD-10-CM

## 2021-02-09 DIAGNOSIS — E03.9 ACQUIRED HYPOTHYROIDISM: ICD-10-CM

## 2021-02-09 LAB — LIPASE SERPL-CCNC: 108 U/L (ref 73–393)

## 2021-02-09 PROCEDURE — 84443 ASSAY THYROID STIM HORMONE: CPT

## 2021-02-09 PROCEDURE — 36415 COLL VENOUS BLD VENIPUNCTURE: CPT

## 2021-02-09 PROCEDURE — 83690 ASSAY OF LIPASE: CPT

## 2021-02-10 ENCOUNTER — CONSULT (OUTPATIENT)
Dept: ENDOCRINOLOGY | Facility: CLINIC | Age: 61
End: 2021-02-10
Payer: COMMERCIAL

## 2021-02-10 VITALS
DIASTOLIC BLOOD PRESSURE: 80 MMHG | SYSTOLIC BLOOD PRESSURE: 126 MMHG | BODY MASS INDEX: 35.61 KG/M2 | HEART RATE: 71 BPM | WEIGHT: 201 LBS | HEIGHT: 63 IN

## 2021-02-10 DIAGNOSIS — R06.83 SNORING: ICD-10-CM

## 2021-02-10 DIAGNOSIS — E03.9 ACQUIRED HYPOTHYROIDISM: ICD-10-CM

## 2021-02-10 DIAGNOSIS — E66.09 CLASS 1 OBESITY DUE TO EXCESS CALORIES WITHOUT SERIOUS COMORBIDITY WITH BODY MASS INDEX (BMI) OF 33.0 TO 33.9 IN ADULT: Primary | ICD-10-CM

## 2021-02-10 DIAGNOSIS — R53.83 OTHER FATIGUE: ICD-10-CM

## 2021-02-10 PROBLEM — R63.5 WEIGHT GAIN: Status: ACTIVE | Noted: 2021-02-10

## 2021-02-10 LAB — TSH SERPL DL<=0.05 MIU/L-ACNC: 8.22 UIU/ML (ref 0.36–3.74)

## 2021-02-10 PROCEDURE — 99244 OFF/OP CNSLTJ NEW/EST MOD 40: CPT | Performed by: INTERNAL MEDICINE

## 2021-02-10 RX ORDER — DEXAMETHASONE 1 MG
TABLET ORAL
Qty: 1 TABLET | Refills: 0 | Status: SHIPPED | OUTPATIENT
Start: 2021-02-10 | End: 2022-01-19 | Stop reason: SDUPTHER

## 2021-02-10 NOTE — PROGRESS NOTES
Chief Complaint   Patient presents with    Hypothyroidism    Weight Gain      Referring Provider  Addy Ortiz Md  [address]  [place],  [address]     History of Present Illness:   Cortney Howard is a 56 y o  female with hypothyroidism seen in consultation at the request of Dr Petey Ackerman  Her  accompanies her for support  Levothyroxine was started in 2020  She takes 25mcg 4 days per week and 50mcg on 3 days  She reports that she has used this regimen for 3 months  She has been taking this for 3 mons  She nad her  recently had COVID  She takes adderral, but has taken this for ADHD     Palpitations when she "pushes herself " Does have anxiety and constipation  She denies tremor, but has hair loss and "deep risges" in her nails  S/p hysterectomy late 45s  She denies changes in neck, changes in voice, or dysphagia  There radiation exposure to head/neck/chest  No hx of heart medications or other antidepressants  She is concerned about her weight  She states she has gained 60# in a year  She no longer works, but was a  at [employer] in 2019 and stopped working in 2020  She did see weight gain increase  Per records, she was seen in 2018 but then was in [place] and we do not have records of her weight then  In 2018, her last recorded weight was 142#  She has CASAS when walking stairs  She does not have ance or muscle wasting, and there is no facial redness or stretch marks on her abdomen  MVI, vitamin D 2000units daily, 100mcg zinc, 1000mg C, black elderberry (immue system)  Diet is "clean," but is not exercising  If BF is eaten, eggs or oat  Tries to fast 10pm-  She watches her grandchildren at least 2x per week (7yos and 4yo)  She has a hx listed as Crohn's, but reports that she was told that she had IBS and UC in the past  She is constipated as well   She recently had a colonoscopy and had   Her sleep patterns are  10pm-530am with one episode of nocturia  She usually returns to sleep without issues  Her  reports that she is tired over the day, but does not nap during the day  They wear trackers and her  has noted "on occasion" she will stop breathing  This is infrequent, but she is snoring now then in the past      Fhx:   thyroid disorder- daughter with hypothyroidism       Patient Active Problem List   Diagnosis    ADHD (attention deficit hyperactivity disorder), combined type    Generalized anxiety disorder with panic attacks    [facility]'s colitis [facility] [address] [facility]    [facility]    Osteopenia    Vitamin D deficiency    Acquired hypothyroidism    Class 1 obesity due to excess calories without serious comorbidity with body mass index (BMI) of 33 0 to 33 9 in adult    Insomnia    Pure hypercholesterolemia    Smoking    Major depressive disorder, recurrent episode, moderate (HCC)    Colon polyp    Mild dilation of ascending aorta (HCC)    Other fatigue    GERD (gastroesophageal reflux disease)    Other constipation    COVID-19    Weight gain      Past Medical History:   Diagnosis Date    Anxiety     Anxiety     Colon polyp     Panic attacks       Past Surgical History:   Procedure Laterality Date     SECTION      COLONOSCOPY      HYSTERECTOMY        Family History   Problem Relation Age of Onset    Lung cancer Mother     Ovarian cancer Maternal Grandmother     Glaucoma Paternal Grandmother     Asthma Son     Hypothyroidism Daughter     Alcohol abuse Neg Hx     Depression Neg Hx     Drug abuse Neg Hx     Substance Abuse Neg Hx      Social History     Tobacco Use    Smoking status: Current Every Day Smoker     Packs/day: 1 00     Years: 40 00     Pack years: 40 00     Types: Cigarettes     Start date: 1980    Smokeless tobacco: Never Used    Tobacco comment: since age 23   Substance Use Topics    Alcohol use: Yes     Comment: Rare- 1 every few weeks      No Known Allergies      Current Outpatient Medications:     ALPRAZolam (XANAX) 1 mg tablet, Take 1/2 to 1 tab PO bid prn anxiety, Disp: 60 tablet, Rfl: 0    amphetamine-dextroamphetamine (ADDERALL XR) 20 MG 24 hr capsule, Take 1 capsule (20 mg total) by mouth every morningMax Daily Amount: 20 mg, Disp: 30 capsule, Rfl: 0    desvenlafaxine (PRISTIQ) 100 mg 24 hr tablet, Take 1 tablet (100 mg total) by mouth daily, Disp: 30 tablet, Rfl: 2    hydrOXYzine HCL (ATARAX) 25 mg tablet, Take 1 tablet (25 mg total) by mouth every 8 (eight) hours as needed for anxiety, Disp: 90 tablet, Rfl: 0    levothyroxine 25 mcg tablet, Take 1 tab x 5 days, 2 tabs x 2 days a week (Patient taking differently: 25 mcg Take 1 tab x 5 days, 2 tabs sat  Sun mon), Disp: 100 tablet, Rfl: 1    traZODone (DESYREL) 50 mg tablet, Take 1 tablet (50 mg total) by mouth daily at bedtime as needed for sleep, Disp: 30 tablet, Rfl: 1    dexamethasone (DECADRON) 1 mg tablet, Take at 11pm the night before [facility], Disp: 1 tablet, Rfl: 0    linaCLOtide (Linzess) 72 MCG CAPS, Take 1 tablet by mouth daily (Patient not taking: Reported on 2/10/2021), Disp: 30 capsule, Rfl: 5  Review of Systems   Constitutional: Positive for fatigue and unexpected weight change  HENT: Negative for hearing loss, trouble swallowing and voice change  Eyes: Negative for visual disturbance  Respiratory: Positive for cough  Negative for shortness of breath  Cardiovascular: Negative for palpitations  Gastrointestinal: Positive for constipation  Negative for diarrhea and nausea  Genitourinary: Negative for menstrual problem  Musculoskeletal: Positive for arthralgias  Skin:        See HPI   Neurological: Negative for tremors  Psychiatric/Behavioral:        ADHD   All other systems reviewed and are negative  see HPI    Physical Exam:  Body mass index is 35 61 kg/m²    /80   Pulse 71   Ht 5' 3" (1 6 m)   Wt 91 2 kg (201 lb)   BMI 35 61 kg/m²    Wt Readings from Last 3 Encounters: 02/10/21 91 2 kg (201 lb)   01/05/21 83 9 kg (185 lb)   01/04/21 83 9 kg (185 lb)       GEN: NAD  E/n/m nl facies, hearing intact bilat, tongue midline, lips nl  Eyes: no stare or proptosis, nl lids and conjunctiva, EOMI  Neck: trachea midline, thyroid NT to palpation, nl in size, no nodules or neck masses noted, no cervical LAD  CV; heart reg rate s1s2 nl, no m/r/g appreciated, no DEEPIKA  Resp: CTAB, good effort  Ab+BS  Neuro: no tremor  MS: no c/c in digits, moves all 4 ext, nl muscle bulk, gait nl, 5/5 strength BU and BLE prox mm groups  Skin: warm and dry, no palmar erythema  Psych: nl mood and affect, no gross lapses in memory    DATA:  Labs:       Lab Results   Component Value Date    TSH 3 760 12/02/2020    FREET4 0 99 03/12/2018         Radiology  Nov 2020  FINDINGS:  Thyroid parenchyma is diffusely heterogeneous in echotexture and also hyperemic      Right lobe:  4 9 x 1 4 x 1 4 cm  Left lobe:  4 x 0 9 x 1 1 cm  Isthmus:  0 5 cm    Impression:  1  Class 1 obesity due to excess calories without serious comorbidity with body mass index (BMI) of 33 0 to 33 9 in adult    2  Snoring    3  Other fatigue    4  Acquired hypothyroidism           Plan:    Demetri Hernandez was seen today for hypothyroidism and weight gain  Diagnoses and all orders for this visit:    Class 1 obesity due to excess calories without serious comorbidity with body mass index (BMI) of 33 0 to 33 9 in adult  -     Ambulatory referral to Weight Management; Future  -     Ambulatory referral to Sleep Medicine; Future    Snoring  -     Ambulatory referral to Weight Management; Future  -     Ambulatory referral to Sleep Medicine; Future    Other fatigue  -     Cortisol- Lab Collect; Future  -     dexamethasone (DECADRON) 1 mg tablet; Take at 11pm the night before fasting blood work at 8am    Acquired hypothyroidism  -     TSH, 3rd generation Lab Collect; Future         1   Hypothyroidism: Her TSH is now back to normal  Thyroid ultrasound is c/w autoimmune process  She will continue levothyroxine 25mcg 4days per week and 50mcg 3 days per week  Repeat TSH  2  Weight gain: Likely multifactorial  Her thyroid levels have improved, but the thyroid alone would not account for a marked weight gain  She is interested in meeting with nutrition, and this order is provided  She is planning to increase activity soon  I will check a 1mg overnight dexamethasone suppression test to verify her cortisol status, but I have a low suspicion for this  3  Possible sleep apnea: She does snore and has a few episodes of not breathing  While these are infrequent, she may benefit from a sleep medicine evaluation      Discussed with the patient and all questioned fully answered  She will call me if any problems arise          Alyssa Kwan MD

## 2021-02-11 DIAGNOSIS — E03.9 ACQUIRED HYPOTHYROIDISM: Primary | ICD-10-CM

## 2021-02-11 RX ORDER — LEVOTHYROXINE SODIUM 0.05 MG/1
50 TABLET ORAL DAILY
Qty: 90 TABLET | Refills: 0
Start: 2021-02-11 | End: 2021-03-16 | Stop reason: SDUPTHER

## 2021-02-11 NOTE — TELEPHONE ENCOUNTER
Please let her know that I have her thyroid result  The TSH is a little high  She had COVID and we can see some changes with the thyroid levels after COVID  I would like her to take levothyroxine 50mcg every day and can send in a new prescription for the 50mcg tabs instead of the 25mcgs  She can get repeat TSH in March with the labs for Dr Dangelo Cevallos   Thanks

## 2021-02-12 DIAGNOSIS — F90.2 ADHD (ATTENTION DEFICIT HYPERACTIVITY DISORDER), COMBINED TYPE: ICD-10-CM

## 2021-02-12 RX ORDER — DEXTROAMPHETAMINE SACCHARATE, AMPHETAMINE ASPARTATE MONOHYDRATE, DEXTROAMPHETAMINE SULFATE AND AMPHETAMINE SULFATE 5; 5; 5; 5 MG/1; MG/1; MG/1; MG/1
20 CAPSULE, EXTENDED RELEASE ORAL EVERY MORNING
Qty: 30 CAPSULE | Refills: 0 | Status: SHIPPED | OUTPATIENT
Start: 2021-02-12 | End: 2021-03-12 | Stop reason: SDUPTHER

## 2021-02-22 ENCOUNTER — TELEPHONE (OUTPATIENT)
Dept: GASTROENTEROLOGY | Facility: CLINIC | Age: 61
End: 2021-02-22

## 2021-02-22 NOTE — TELEPHONE ENCOUNTER
Patients GI provider:  Dr Sina Marcum    Number to return call: (    Reason for call: Pt calling stating she hasn't had a bowel movement in 6 days  Pt   Rodrigo Mancia works really well for her and she would like another script for it      Scheduled procedure/appointment date if applicable: N/A

## 2021-02-23 DIAGNOSIS — K59.09 CHRONIC CONSTIPATION: Primary | ICD-10-CM

## 2021-02-23 RX ORDER — LINACLOTIDE 145 UG/1
145 CAPSULE, GELATIN COATED ORAL DAILY
Qty: 30 CAPSULE | Refills: 5 | Status: SHIPPED | OUTPATIENT
Start: 2021-02-23 | End: 2021-02-24

## 2021-02-23 NOTE — TELEPHONE ENCOUNTER
Spoke with Dunia, [address], who will set aside sample of Linzess 145 mcg for patient to  at [facility] office  Called patient to make her aware but reached voicemail, instructed her to call back    Clinical: Can you please work on pre auth for patient's Shilpa Pat? Thank you!

## 2021-02-23 NOTE — TELEPHONE ENCOUNTER
Patient of [place], [place], last seen 1/4/21 by Dr Sravan Sinha for EGD/colonoscopy    History of chronic constipation    Called and spoke with patient  She states that in the past, she has tried "every constipation medication" and the only thing that works in [facility] [employer]  Post colonoscopy, she was prescribed 72 mcg Linzess, but states her insurance will not cover it  She states she got samples, but she ran out 8 days ago and has not had a bowel movement for 7 days now  She states she is very uncomfortable and looks pregnant because her abdomen is so distended  She states she has been using miralax, senna, docusate, etc and still has been unable to go  I did let her know we can work on pre auth for her to get her Linzess, but that can take over a week, so I recommended using magnesium citrate in the meantime to help initiate bowel movement  She is agreeable to this  She requests we prescribe 145 mcg of Linzess instead of 72 because that was the dose she was on in the past that worked for her  Please advise  Prescribe 145 mcg?  If so I will have clinical team initiate pre auth

## 2021-02-24 ENCOUNTER — TELEPHONE (OUTPATIENT)
Dept: GASTROENTEROLOGY | Facility: AMBULARY SURGERY CENTER | Age: 61
End: 2021-02-24

## 2021-02-24 DIAGNOSIS — K59.09 OTHER CONSTIPATION: Primary | ICD-10-CM

## 2021-02-24 NOTE — TELEPHONE ENCOUNTER
Yes, both medications are used for similar indications, and work by improving intestinal fluid secretion and motility    Thank you

## 2021-02-24 NOTE — TELEPHONE ENCOUNTER
Pt aware of amitiza prescription  She would like to know if it will work the same way Linzess does  Please advise  Thank you!

## 2021-02-24 NOTE — TELEPHONE ENCOUNTER
Received denial from [employer] for Linzess 145mcg  Can something else be prescribed? Please advise  Thank you!

## 2021-02-25 ENCOUNTER — CONSULT (OUTPATIENT)
Dept: BARIATRICS | Facility: CLINIC | Age: 61
End: 2021-02-25
Payer: COMMERCIAL

## 2021-02-25 VITALS
DIASTOLIC BLOOD PRESSURE: 72 MMHG | WEIGHT: 193.9 LBS | HEART RATE: 72 BPM | HEIGHT: 62 IN | TEMPERATURE: 97.9 F | RESPIRATION RATE: 16 BRPM | BODY MASS INDEX: 35.68 KG/M2 | SYSTOLIC BLOOD PRESSURE: 110 MMHG

## 2021-02-25 DIAGNOSIS — E03.9 ACQUIRED HYPOTHYROIDISM: ICD-10-CM

## 2021-02-25 DIAGNOSIS — E66.09 CLASS 1 OBESITY DUE TO EXCESS CALORIES WITHOUT SERIOUS COMORBIDITY WITH BODY MASS INDEX (BMI) OF 33.0 TO 33.9 IN ADULT: ICD-10-CM

## 2021-02-25 DIAGNOSIS — E66.9 OBESITY, CLASS II, BMI 35-39.9: Primary | ICD-10-CM

## 2021-02-25 DIAGNOSIS — R06.83 SNORING: ICD-10-CM

## 2021-02-25 DIAGNOSIS — F33.1 MAJOR DEPRESSIVE DISORDER, RECURRENT EPISODE, MODERATE (HCC): ICD-10-CM

## 2021-02-25 DIAGNOSIS — F90.2 ADHD (ATTENTION DEFICIT HYPERACTIVITY DISORDER), COMBINED TYPE: ICD-10-CM

## 2021-02-25 DIAGNOSIS — F39 MOOD DISORDER (HCC): ICD-10-CM

## 2021-02-25 DIAGNOSIS — R63.5 ABNORMAL WEIGHT GAIN: ICD-10-CM

## 2021-02-25 PROBLEM — E66.812 OBESITY, CLASS II, BMI 35-39.9: Status: ACTIVE | Noted: 2020-07-24

## 2021-02-25 PROCEDURE — 99244 OFF/OP CNSLTJ NEW/EST MOD 40: CPT | Performed by: PHYSICIAN ASSISTANT

## 2021-02-25 RX ORDER — MULTIVIT-MIN/IRON FUM/FOLIC AC 7.5 MG-4
1 TABLET ORAL DAILY
COMMUNITY

## 2021-02-25 NOTE — ASSESSMENT & PLAN NOTE
-Discussed options of HealthyCORE-Intensive Lifestyle Intervention Program, Very Low Calorie Diet-VLCD and Conservative Program and the role of weight loss medications   -Initial weight loss goal of 5-10% weight loss for improved health  -Screening labs  Recommend checking lab coverage before having labs drawn   -tsh reviewed from 2/9/21  Needs cmp, lipid, a1c and fasting insulin   - STOP BANG- 5/8- has sleep med appt on 4/14  -Patient is interested in pursuing Conservative Program possibly interested in vlcd  Samples given   Not a phentermine candidate due to adderall use  Goals:  Food log (ie ) www myfitnesspal com,sparkpeople  com,loseit com,calorieking  com,etc  baritastic  No sugary beverages  At least 64oz of water daily  Increase physical activity by 10 minutes daily   Gradually increase physical activity to a goal of 5 days per week for 30 minutes of MODERATE intensity PLUS 2 days per week of FULL BODY resistance training-3 days a week for 45 minutes   3868-2810 calories per day    VLCD Review:  Contraindications: no  Labs ordered: yes-mag  HTN meds addressed: n/a  DM2 meds addressed: n/a  VLCD time restriction based on BMI: no  LMP/OCP: postmenopausal   Would need approval from cardiology

## 2021-02-25 NOTE — PROGRESS NOTES
Assessment/Plan:    Obesity, Class II, BMI 35-39 9  -Discussed options of Healthy[facility]-Intensive Lifestyle Intervention Program, Very Low Calorie Diet-VLCD and Conservative Program and the role of weight loss medications   -Initial weight loss goal of 5-10% weight loss for improved health  -Screening labs  Recommend checking lab coverage before having labs drawn   -tsh reviewed from 2/9/21  Needs cmp, lipid, a1c and fasting insulin   - STOP BANG- 5/8- has sleep med appt on 4/14  -Patient is interested in pursuing Conservative Program possibly interested in vlcd  Samples given   Not a phentermine candidate due to adderall use  Goals:  Food log (ie ) www myfitnesspal com,sparkpeople  com,loseit com,calorieking  com,etc  baritastic  No sugary beverages  At least 64oz of water daily  Increase physical activity by 10 minutes daily  Gradually increase physical activity to a goal of 5 days per week for 30 minutes of MODERATE intensity PLUS 2 days per week of FULL BODY resistance training-3 days a week for 45 minutes   0175-4943 calories per day    VLCD Review:  Contraindications: no  Labs ordered: yes-mag  HTN meds addressed: n/a  DM2 meds addressed: n/a  VLCD time restriction based on BMI: no  LMP/OCP: postmenopausal   Would need approval from cardiology     Major depressive disorder, recurrent episode, moderate (HCC)  Taking xanax, atarax, trazodone and pristiq  -continue management with prescribing provider      Mood disorder ([facility] [address] )  Taking xanax, atarax, trazodone and pristiq  -continue management with prescribing provider    ADHD (attention deficit hyperactivity disorder), combined type  Taking adderall  -continue management with prescribing provider      Acquired hypothyroidism  Taking levothyroxine  -continue management with prescribing provider      Follow up in approximately 2 weeks with Non-Surgical Physician/Advanced Practitioner      Diagnoses and all orders for this visit:    Obesity, Class II, BMI 35-39 9  - Insulin, fasting; Future  -     HEMOGLOBIN A1C W/ EAG ESTIMATION; Future  -     Magnesium; Future    Class 1 obesity due to excess calories without serious comorbidity with body mass index (BMI) of 33 0 to 33 9 in adult  -     Ambulatory referral to Weight Management    Snoring  -     Ambulatory referral to Weight Management    Major depressive disorder, recurrent episode, moderate (HCC)  -     Insulin, fasting; Future  -     HEMOGLOBIN A1C W/ EAG ESTIMATION; Future  -     Magnesium; Future    Mood disorder (HCC)  -     Insulin, fasting; Future  -     HEMOGLOBIN A1C W/ EAG ESTIMATION; Future  -     Magnesium; Future    ADHD (attention deficit hyperactivity disorder), combined type  -     Insulin, fasting; Future  -     HEMOGLOBIN A1C W/ EAG ESTIMATION; Future  -     Magnesium; Future    Acquired hypothyroidism  -     Insulin, fasting; Future  -     HEMOGLOBIN A1C W/ EAG ESTIMATION; Future  -     Magnesium; Future    Abnormal weight gain  -     Insulin, fasting; Future  -     HEMOGLOBIN A1C W/ EAG ESTIMATION; Future  -     Magnesium; Future    Other orders  -     Multiple Vitamins-Minerals (multivitamin with minerals) tablet; Take 1 tablet by mouth daily          Subjective:   Chief Complaint   Patient presents with    Consult     MWM consult  stop bang    measurements       Patient ID: Emily Mandujano  is a 56 y o  female with excess weight/obesity here to pursue weight management  Past Medical History:   Diagnosis Date    Anxiety     Anxiety     Colon polyp     Panic attacks        HPI:  Obesity/Excess Weight:  Severity: Severe  Onset: For the last year-gained 60 lbs    Modifiers: has seen multiple doctors who referred her here  Contributing factors:  Insufficient Physical Activity and thyroid   Associated symptoms: increased joint pain, decreased exercise capacity, decreased self esteem, decreased mobility and depression    Goals: 135 lbs   Hydration:3 bottles of water, 5 cups of coffee-creamer  Alcohol: 1 glass of wine per week     Colonoscopy-Completed 01/04/2021     The following portions of the patient's history were reviewed and updated as appropriate: allergies, current medications, past family history, past medical history, past social history, past surgical history and problem list     Review of Systems   HENT: Negative for sore throat  Respiratory: Negative for cough and shortness of breath  Cardiovascular: Negative for chest pain and palpitations  Gastrointestinal: Positive for constipation (ibs)  Negative for abdominal pain, diarrhea, nausea and vomiting  Denies GERD   Endocrine: Positive for cold intolerance  Negative for heat intolerance  Genitourinary: Negative for dysuria  Musculoskeletal: Positive for arthralgias (knees and ankles ) and back pain  Skin: Negative for rash  Neurological: Negative for headaches  Psychiatric/Behavioral: Negative for suicidal ideas (denies HI)  + Depression and anxiety-controlled with meds        Objective:    /72 (BP Location: Left arm, Patient Position: Sitting, Cuff Size: Adult)   Pulse 72   Temp 97 9 °F (36 6 °C) (Tympanic)   Resp 16   Ht 5' 1 7" (1 567 m)   Wt 88 kg (193 lb 14 4 oz)   BMI 35 81 kg/m²     Physical Exam  Vitals signs and nursing note reviewed  Constitutional   General appearance: Abnormal   well developed and morbidly obese  Eyes No conjunctival pallor  Ears, Nose, Mouth, and Throat Bilateral external ears- no discharge, edema, erythema   Pulmonary   Respiratory effort: No increased work of breathing or signs of respiratory distress  Auscultation of lungs: Clear to auscultation, equal breath sounds bilaterally, no wheezes, no rales, no rhonci  Cardiovascular   Auscultation of heart: Normal rate and rhythm, normal S1 and S2, without murmurs  Examination of extremities for edema and/or varicosities: Normal   no edema     Abdomen   Abdomen: Abnormal   The abdomen was obese  Bowel sounds were normal  The abdomen was soft and nontender     Musculoskeletal   Gait and station: Normal     Psychiatric   Orientation to person, place and time: Normal     Affect: appropriate

## 2021-02-26 ENCOUNTER — TELEPHONE (OUTPATIENT)
Dept: SLEEP CENTER | Facility: CLINIC | Age: 61
End: 2021-02-26

## 2021-02-26 NOTE — TELEPHONE ENCOUNTER
----- Message from Bisi Owens MD sent at 2/25/2021 10:56 AM EST -----  Approved  ----- Message -----  From: Radha Lundy  Sent: 3/85/9360   9:47 AM EST  To: Sleep Medicine [facility] Departed Provider    This sleep study needs approval      If approved please sign and return to clerical pool  If denied please include reasons why  Also provide alternative testing if warranted  Please sign and return to clerical pool

## 2021-02-27 ENCOUNTER — TELEPHONE (OUTPATIENT)
Dept: OTHER | Facility: OTHER | Age: 61
End: 2021-02-27

## 2021-02-27 DIAGNOSIS — F41.9 ANXIETY: ICD-10-CM

## 2021-02-28 RX ORDER — TRAZODONE HYDROCHLORIDE 50 MG/1
TABLET ORAL
Qty: 30 TABLET | Refills: 1 | Status: SHIPPED | OUTPATIENT
Start: 2021-02-28 | End: 2021-03-16

## 2021-03-01 ENCOUNTER — TELEPHONE (OUTPATIENT)
Dept: INTERNAL MEDICINE CLINIC | Facility: CLINIC | Age: 61
End: 2021-03-01

## 2021-03-01 DIAGNOSIS — R06.83 SNORING: ICD-10-CM

## 2021-03-01 DIAGNOSIS — F33.1 MAJOR DEPRESSIVE DISORDER, RECURRENT EPISODE, MODERATE (HCC): ICD-10-CM

## 2021-03-01 DIAGNOSIS — R53.83 OTHER FATIGUE: Primary | ICD-10-CM

## 2021-03-01 RX ORDER — DESVENLAFAXINE 100 MG/1
TABLET, EXTENDED RELEASE ORAL
Qty: 30 TABLET | Refills: 2 | OUTPATIENT
Start: 2021-03-01

## 2021-03-01 NOTE — TELEPHONE ENCOUNTER
Patient will be going to Sleep Medicine, she was referred by Endo but Doctor to Doctor referral needs to come from PCP - because of patients insurance      Dx: [phone] and [phone]    Please put into Epic

## 2021-03-05 ENCOUNTER — TRANSCRIBE ORDERS (OUTPATIENT)
Dept: LAB | Facility: CLINIC | Age: 61
End: 2021-03-05

## 2021-03-08 ENCOUNTER — TRANSCRIBE ORDERS (OUTPATIENT)
Dept: LAB | Facility: CLINIC | Age: 61
End: 2021-03-08

## 2021-03-08 ENCOUNTER — OFFICE VISIT (OUTPATIENT)
Dept: SLEEP CENTER | Facility: CLINIC | Age: 61
End: 2021-03-08
Payer: COMMERCIAL

## 2021-03-08 ENCOUNTER — LAB (OUTPATIENT)
Dept: LAB | Facility: CLINIC | Age: 61
End: 2021-03-08
Payer: COMMERCIAL

## 2021-03-08 DIAGNOSIS — R53.83 OTHER FATIGUE: ICD-10-CM

## 2021-03-08 DIAGNOSIS — R63.5 ABNORMAL WEIGHT GAIN: ICD-10-CM

## 2021-03-08 DIAGNOSIS — F39 MOOD DISORDER (HCC): ICD-10-CM

## 2021-03-08 DIAGNOSIS — R06.83 SNORING: ICD-10-CM

## 2021-03-08 DIAGNOSIS — R63.5 WEIGHT GAIN: ICD-10-CM

## 2021-03-08 DIAGNOSIS — E66.9 OBESITY (BMI 30-39.9): ICD-10-CM

## 2021-03-08 DIAGNOSIS — E66.9 OBESITY, CLASS II, BMI 35-39.9: ICD-10-CM

## 2021-03-08 DIAGNOSIS — F51.02 ADJUSTMENT INSOMNIA: ICD-10-CM

## 2021-03-08 DIAGNOSIS — F90.2 ADHD (ATTENTION DEFICIT HYPERACTIVITY DISORDER), COMBINED TYPE: ICD-10-CM

## 2021-03-08 DIAGNOSIS — E03.9 ACQUIRED HYPOTHYROIDISM: ICD-10-CM

## 2021-03-08 DIAGNOSIS — Z86.16 PERSONAL HISTORY OF COVID-19: ICD-10-CM

## 2021-03-08 DIAGNOSIS — F45.8 BRUXISM: ICD-10-CM

## 2021-03-08 DIAGNOSIS — G47.33 OSA (OBSTRUCTIVE SLEEP APNEA): Primary | ICD-10-CM

## 2021-03-08 DIAGNOSIS — F33.1 MAJOR DEPRESSIVE DISORDER, RECURRENT EPISODE, MODERATE (HCC): ICD-10-CM

## 2021-03-08 LAB
EST. AVERAGE GLUCOSE BLD GHB EST-MCNC: 108 MG/DL
HBA1C MFR BLD: 5.4 %
INSULIN SERPL-ACNC: 6.6 MU/L (ref 3–25)
MAGNESIUM SERPL-MCNC: 2 MG/DL (ref 1.6–2.6)

## 2021-03-08 PROCEDURE — 99244 OFF/OP CNSLTJ NEW/EST MOD 40: CPT | Performed by: INTERNAL MEDICINE

## 2021-03-08 PROCEDURE — 83735 ASSAY OF MAGNESIUM: CPT

## 2021-03-08 PROCEDURE — 36415 COLL VENOUS BLD VENIPUNCTURE: CPT

## 2021-03-08 PROCEDURE — 83525 ASSAY OF INSULIN: CPT

## 2021-03-08 PROCEDURE — 83036 HEMOGLOBIN GLYCOSYLATED A1C: CPT

## 2021-03-08 NOTE — PATIENT INSTRUCTIONS
What is NICA? Obstructive sleep apnea is a common and serious sleep disorder that causes you to stop breathing during sleep  The airway repeatedly becomes blocked, limiting the amount of air that reaches your lungs  When this happens, you may snore loudly or making choking noises as you try to breathe  Your brain and body becomes oxygen deprived and you may wake up  This may happen a few times a night, or in more severe cases, several hundred times a night  Sleep apnea can make you wake up in the morning feeling tired or unrefreshed even though you have had a full night of sleep  During the day, you may feel fatigued, have difficulty concentrating or you may even unintentionally fall asleep  This is because your body is waking up numerous times throughout the night, even though you might not be conscious of each awakening  The lack of oxygen your body receives can have negative long-term consequences for your health  This includes:  High blood pressure  Heart disease  Irregular heart rhythms  Stroke  Pre-diabetes and diabetes  Depression    Testing  An objective evaluation of your sleep may be needed before your board certified sleep physician can make a diagnosis  Options include:   In-lab overnight sleep study  This type of sleep study requires you to stay overnight at a sleep center, in a bed that may resemble a hotel room  You will sleep with sensors hooked up to various parts of your body  These sensors record your brain waves, heartbeat, breathing and movement  An overnight sleep study also provides your doctor with the most complete information about your sleep  Learn more about an overnight sleep study      Home sleep apnea test  Some patients with high risk factors for obstructive sleep apnea and no other medical disorders may be candidates for a home sleep apnea test  The testing equipment differs in that it is less complicated than what is used in an overnight sleep study   As such, does not give all the data an in-lab will and if negative, may not mean you do not have the problem  Treatment for sleep apnea  includes using a continuous positive airway pressure (CPAP) machine to keep your airway open during sleep  A mask is placed over your nose and mouth, or just your nose  The mask is hooked to the CPAP machine that blows a gentle stream of air into the mask when you breathe  This helps keep your airway open so you can breathe more regularly  Extra oxygen may be given to you through the machine  You may be given a mouth device  It looks like a mouth guard or dental retainer and stops your tongue and mouth tissues from blocking your throat while you sleep  Surgery may be needed to remove extra tissues that block your mouth, throat, or nose  Manage sleep apnea:   Do not smoke  Nicotine and other chemicals in cigarettes and cigars can cause lung damage  Ask your healthcare provider for information if you currently smoke and need help to quit  E-cigarettes or smokeless tobacco still contain nicotine  Talk to your healthcare provider before you use these products  Do not drink alcohol or take sedative medicine before you go to sleep  Alcohol and sedatives can relax the muscles and tissues around your throat  This can block the airflow to your lungs  Maintain a healthy weight  Excess tissue around your throat may restrict your breathing  Ask your healthcare provider for information if you need to lose weight  Sleep on your side or use pillows designed to prevent sleep apnea  This prevents your tongue or other tissues from blocking your throat  You can also raise the head of your bed  Driving Safety  Refrain from driving when drowsy  Follow up with your healthcare provider as directed:  Write down your questions so you remember to ask them during your visits  Go to AASM website for more information: Sleepeducation  org     What is NICA?    Obstructive sleep apnea is a common and serious sleep disorder that causes you to stop breathing during sleep  The airway repeatedly becomes blocked, limiting the amount of air that reaches your lungs  When this happens, you may snore loudly or making choking noises as you try to breathe  Your brain and body becomes oxygen deprived and you may wake up  This may happen a few times a night, or in more severe cases, several hundred times a night  Sleep apnea can make you wake up in the morning feeling tired or unrefreshed even though you have had a full night of sleep  During the day, you may feel fatigued, have difficulty concentrating or you may even unintentionally fall asleep  This is because your body is waking up numerous times throughout the night, even though you might not be conscious of each awakening  The lack of oxygen your body receives can have negative long-term consequences for your health  This includes:  High blood pressure  Heart disease  Irregular heart rhythms  Stroke  Pre-diabetes and diabetes  Depression    Testing  An objective evaluation of your sleep may be needed before your board certified sleep physician can make a diagnosis  Options include:   In-lab overnight sleep study  This type of sleep study requires you to stay overnight at a sleep center, in a bed that may resemble a hotel room  You will sleep with sensors hooked up to various parts of your body  These sensors record your brain waves, heartbeat, breathing and movement  An overnight sleep study also provides your doctor with the most complete information about your sleep  Learn more about an overnight sleep study      Home sleep apnea test  Some patients with high risk factors for obstructive sleep apnea and no other medical disorders may be candidates for a home sleep apnea test  The testing equipment differs in that it is less complicated than what is used in an overnight sleep study   As such, does not give all the data an in-lab will and if negative, may not mean you do not have the problem  Treatment for sleep apnea  includes using a continuous positive airway pressure (CPAP) machine to keep your airway open during sleep  A mask is placed over your nose and mouth, or just your nose  The mask is hooked to the CPAP machine that blows a gentle stream of air into the mask when you breathe  This helps keep your airway open so you can breathe more regularly  Extra oxygen may be given to you through the machine  You may be given a mouth device  It looks like a mouth guard or dental retainer and stops your tongue and mouth tissues from blocking your throat while you sleep  Surgery may be needed to remove extra tissues that block your mouth, throat, or nose  Manage sleep apnea:   Do not smoke  Nicotine and other chemicals in cigarettes and cigars can cause lung damage  Ask your healthcare provider for information if you currently smoke and need help to quit  E-cigarettes or smokeless tobacco still contain nicotine  Talk to your healthcare provider before you use these products  Do not drink alcohol or take sedative medicine before you go to sleep  Alcohol and sedatives can relax the muscles and tissues around your throat  This can block the airflow to your lungs  Maintain a healthy weight  Excess tissue around your throat may restrict your breathing  Ask your healthcare provider for information if you need to lose weight  Sleep on your side or use pillows designed to prevent sleep apnea  This prevents your tongue or other tissues from blocking your throat  You can also raise the head of your bed  Driving Safety  Refrain from driving when drowsy  Follow up with your healthcare provider as directed:  Write down your questions so you remember to ask them during your visits  Go to AASM website for more information: Sleepeducation  org     What you can do to improve your sleep: (Sleep Hygiene) Basic rules for a good night's sleep    Create a regular sleep schedule    This will help you form a sleep routine  Keep a record of your sleep patterns, and any sleeping problems you have  Bring the record to follow-up visits with healthcare providers  Avoid prolonged use of light-emitting screens before bedtime or watching TV in bed  Avoid forcing sleep  Do not take naps  Naps could make it hard for you to fall asleep at bedtime  Deal with your worries before bedtime  Keep your bedroom cool, quiet, and dark  Turn on white noise, such as a fan, to help you relax  Do not use your bed for any activity that will keep you awake  Do not read, exercise, eat, or watch TV in your bedroom  Get up if you do not fall asleep within 20 minutes  Move to another room and do something relaxing until you become sleepy  Limit caffeine, alcohol, nicotine and food to earlier in the day  Only drink caffeine in the morning  Do not drink alcohol within 6 hours of bedtime  Do not eat a heavy meal right before you go to bed  Avoid smoking, especially in the evening  Exercise regularly  Daily exercise will help you sleep better  Do not exercise within 4 hours of bedtime  Stimulus control therapy rules  1  Go to bed only when sleepy  2  Do not watch television, read, eat, or worry while in bed  Use bed only for sleep and sex  3  Get out of bed if unable to fall asleep within 20 minutes and go to another room  Return to bed only when sleepy  Repeat this step as many times as necessary throughout the night  4  Set an alarm clock to wake up at a fixed time each morning, including weekends  5  Do not take a nap during the day  Data from: [address] [place] [address], [address] Nonpharmacologic treatments of insomnia  J Clin Psychiatry 9836; 53:37  Go to AASM website for more information: Sleepeducation  org     Recommended Reading:  Book by authors [address]   No More sleepless nights          Nursing Support:  When: Monday through Friday 7A-5PM except holidays  Where: Our direct line is [phone]  If you are having a true emergency please call 911  In the event that the line is busy or it is after hours please leave a voice message and we will return your call  Please speak clearly, leaving your full name, birth date, best number to reach you and the reason for your call  Medication refills: We will need the name of the medication, the dosage, the ordering provider, whether you get a 30 or 90 day refill, and the pharmacy name and address  Medications will be ordered by the provider only  Nurses cannot call in prescriptions  Please allow 7 days for medication refills  Physician requested updates: If your provider requested that you call with an update after starting medication, please be ready to provide us the medication and dosage, what time you take your medication, the time you attempt to fall asleep, time you fall asleep, when you wake up, and what time you get out of bed  Sleep Study Results: We will contact you with sleep study results and/or next steps after the physician has reviewed your testing

## 2021-03-08 NOTE — PROGRESS NOTES
Virtual Regular Visit      Assessment/Plan:    Problem List Items Addressed This Visit        Other    ADHD (attention deficit hyperactivity disorder), combined type    Mood disorder ([address] )    Insomnia    Other fatigue    Weight gain      Other Visit Diagnoses     NICA (obstructive sleep apnea)    -  Primary    Snoring        Bruxism        Obesity (BMI 30-39  9)        Personal history of covid-19                   Reason for visit is   Chief Complaint   Patient presents with    Virtual Regular Visit        Encounter provider Óscar Webb MD    Provider located at [address] [address] [phone]      Recent Visits  No visits were found meeting these conditions  Showing recent visits within past 7 days and meeting all other requirements     Today's Visits  Date Type Provider Dept   03/08/21 Office Visit Óscar Webb MD [facility] 10 today's visits and meeting all other requirements     Future Appointments  No visits were found meeting these conditions  Showing future appointments within next 150 days and meeting all other requirements        The patient was identified by name and date of birth  Rimma Sherman was informed that this is a telemedicine visit and that the visit is being conducted through [facility] and patient was informed that this is a secure, HIPAA-compliant platform  She agrees to proceed     My office door was closed  No one else was in the room  She acknowledged consent and understanding of privacy and security of the video platform  The patient has agreed to participate and understands they can discontinue the visit at any time  Patient is aware this is a billable service  Consultation - [address] [place] [address]  56 y o  female  Dareen Minors  DUB:[id number]    Physician Requesting Consult: Brooke Kumar MD     Reason for Consult :  At your kind request I saw Davian Ulrich Karla for initial sleep evaluation today  The patient is here to evaluate for suspected Obstructive Sleep Apnea  PFSH, Problem List, Medications & Allergies were reviewed in EMR  Robby Vaughn  has a past medical history of Anxiety, Anxiety, Colon polyp, and Panic attacks  She has a current medication list which includes the following prescription(s): alprazolam, amphetamine-dextroamphetamine, desvenlafaxine, dexamethasone, hydroxyzine hcl, levothyroxine, lubiprostone, multivitamin with minerals, and trazodone  HPI:   She presents with complaints of constant fatigue irrespective of sleep of several years duration  She also reports loud snoring that disturbs others and bed partner has witnessed apneas  She awakens herself with choking or gasping  She is not aware of modifying factors  Other Complaints: none  Restless Leg Syndrome: reports no suggestive symptoms    Parasomnia: no features reported    Sleep Routine (averages): She uses trazodone as a sleep aid  Typical Bedtime:  11:00 p m  Gets OOB: 6:00 a m  TIB:7 hrs  Sleep latency:<  30 minutes Sleep Interruptions:2-3/nite  and is able to fall back asleep  Awakens: spontaneously  Upon awakening: never feels rested  Robby Vaughn reports Excessive Daytime Sleepiness feels like napping but avoids and rated herself at Total score: 2 /24 on the [place] Sleepiness Scale  Habits:  reports that she has been smoking cigarettes  She started smoking about 41 years ago  She has a 40 00 pack-year smoking history  She has never used smokeless tobacco  ;   E-Cigarette/Vaping    E-Cigarette Use Never User     ;  reports no history of drug use ;  reports current alcohol use  ; Caffeine use:excessive  untilaround 6:00 p m  ; Exercise routine: none   Family History: Negative for sleep disturbance  ROS - see attached  Significant for  Approximately 60 lb weight gain in the past 1 year  She reported no nasal or respiratory symptoms    She reports fluttering sensation in her chest for which she is being evaluated by Cardiology  She has feelings of anxiety and depression that she attributes to COVID-19  EXAM:  There were no vitals taken for this visit  General: Well groomed female, well appearing, in no apparent distress  Neurological: Alert and orientated;  cooperative; Cranial nerves intact;    Psychiatric: Speech:clear and coherent; Appears anxious, otherwise normal mood, affect & thought   Skin: warm and dry; Color& Hydration good; no facial rashes or lesions   HEENT:  Craniofacial anatomy: normal Sinuses: non- tender  TMJ: Normal    Eyes: EOM's intact;  conjunctiva/corneas clear   Ears: Externallyappear normal     Nasal Airway: is patent Septum:intact; Mucosa: normal; Turbinates: normal; Rhinorrhea: None   Mouth: Lips: normal posture; Dentition: worn down  Mucosa:moist  ; Hard Palate:normal    Oropharryx: crowdedTongue: Mallampati:Class IV and Mobile   Neck: Thyroid: appearsnormal  Trachea:central     Lungs: Respiratory Effort:normal    Extremities: No clubbing or cyanosis  Musculoskeletal:  Motor normal; Gait:normal        IMPRESSION: Primary, Secondary (to Medical or Psych conditions) Diagnoses & Comorbidities   1  NICA (obstructive sleep apnea)     2  Snoring  Ambulatory referral to Sleep Medicine   3  Other fatigue  Ambulatory referral to Sleep Medicine   4  Adjustment insomnia     5  Bruxism     6  ADHD (attention deficit hyperactivity disorder), combined type     7  Mood disorder ([facility] [address] )     8  Weight gain     9  Obesity (BMI 30-39 9)     10  Personal history of covid-19          PLAN:    1  With respect to above conditions, comprehensive counseling provided on pathophysiology; effects on symptoms and comorbidities, diagnostic strategies & limitations; treatment options; risks or no treament; risks & benefits of the various therapeutic options; costs and insurance aspects      2  I advised weight reduction, avoiding sleeping supine, using alcohol or sedating medications close to bed time and on safe driving practices  3  Nocturnal polysomnography is indicated and a diagnostic study   has been scheduled  4  Patient is willing to try Positive airway pressure therapy and will be scheduled for a titration study if indicated  5  Cognitive behavioral therapy was initiated, Sleep Hygiene and behavioral techniques to manage Insomnia were discussed  6   I also advised avoiding smoking close to bedtime and preferably smoking cessation  7   Follow-up to be scheduled after the studies to review results, further details of treatment options and to initiate/adjust therapy  Sincerely,     Authenticated electronically by Julieta Mendez MD   on 44/24/63   Board Certified Specialist     I spent 30 minutes directly with the patient during this visit      VIRTUAL VISIT DISCLAIMER    Everett Simmons acknowledges that she has consented to an online visit or consultation  She understands that the online visit is based solely on information provided by her, and that, in the absence of a face-to-face physical evaluation by the physician, the diagnosis she receives is both limited and provisional in terms of accuracy and completeness  This is not intended to replace a full medical face-to-face evaluation by the physician  Everett Simmons understands and accepts these terms

## 2021-03-09 ENCOUNTER — TELEPHONE (OUTPATIENT)
Dept: BARIATRICS | Facility: CLINIC | Age: 61
End: 2021-03-09

## 2021-03-09 NOTE — TELEPHONE ENCOUNTER
Pt  Called to check on status of Cardiology clearance  Pt is anxious to start the VLCD diet      Please advise

## 2021-03-10 DIAGNOSIS — Z23 ENCOUNTER FOR IMMUNIZATION: ICD-10-CM

## 2021-03-12 DIAGNOSIS — F90.2 ADHD (ATTENTION DEFICIT HYPERACTIVITY DISORDER), COMBINED TYPE: ICD-10-CM

## 2021-03-12 DIAGNOSIS — F33.1 MAJOR DEPRESSIVE DISORDER, RECURRENT EPISODE, MODERATE (HCC): ICD-10-CM

## 2021-03-12 DIAGNOSIS — F41.9 ANXIETY: ICD-10-CM

## 2021-03-12 RX ORDER — TRAZODONE HYDROCHLORIDE 50 MG/1
50 TABLET ORAL
Qty: 30 TABLET | Refills: 1 | OUTPATIENT
Start: 2021-03-12

## 2021-03-12 RX ORDER — DESVENLAFAXINE 100 MG/1
100 TABLET, EXTENDED RELEASE ORAL DAILY
Qty: 30 TABLET | Refills: 2 | Status: SHIPPED | OUTPATIENT
Start: 2021-03-12 | End: 2021-06-02 | Stop reason: SDUPTHER

## 2021-03-12 RX ORDER — DEXTROAMPHETAMINE SACCHARATE, AMPHETAMINE ASPARTATE MONOHYDRATE, DEXTROAMPHETAMINE SULFATE AND AMPHETAMINE SULFATE 5; 5; 5; 5 MG/1; MG/1; MG/1; MG/1
20 CAPSULE, EXTENDED RELEASE ORAL EVERY MORNING
Qty: 30 CAPSULE | Refills: 0 | Status: SHIPPED | OUTPATIENT
Start: 2021-03-12 | End: 2021-03-16

## 2021-03-16 ENCOUNTER — OFFICE VISIT (OUTPATIENT)
Dept: PSYCHIATRY | Facility: CLINIC | Age: 61
End: 2021-03-16
Payer: COMMERCIAL

## 2021-03-16 DIAGNOSIS — F51.01 PRIMARY INSOMNIA: ICD-10-CM

## 2021-03-16 DIAGNOSIS — F33.1 MAJOR DEPRESSIVE DISORDER, RECURRENT EPISODE, MODERATE (HCC): Primary | ICD-10-CM

## 2021-03-16 DIAGNOSIS — F90.2 ADHD (ATTENTION DEFICIT HYPERACTIVITY DISORDER), COMBINED TYPE: ICD-10-CM

## 2021-03-16 DIAGNOSIS — E03.9 ACQUIRED HYPOTHYROIDISM: ICD-10-CM

## 2021-03-16 DIAGNOSIS — F41.1 GENERALIZED ANXIETY DISORDER WITH PANIC ATTACKS: ICD-10-CM

## 2021-03-16 DIAGNOSIS — F41.0 GENERALIZED ANXIETY DISORDER WITH PANIC ATTACKS: ICD-10-CM

## 2021-03-16 PROCEDURE — 99213 OFFICE O/P EST LOW 20 MIN: CPT | Performed by: STUDENT IN AN ORGANIZED HEALTH CARE EDUCATION/TRAINING PROGRAM

## 2021-03-16 RX ORDER — TRAZODONE HYDROCHLORIDE 150 MG/1
150 TABLET ORAL
Qty: 30 TABLET | Refills: 1 | Status: SHIPPED | OUTPATIENT
Start: 2021-03-16 | End: 2021-04-30

## 2021-03-16 RX ORDER — DEXTROAMPHETAMINE SACCHARATE, AMPHETAMINE ASPARTATE, DEXTROAMPHETAMINE SULFATE AND AMPHETAMINE SULFATE 2.5; 2.5; 2.5; 2.5 MG/1; MG/1; MG/1; MG/1
10 TABLET ORAL
Qty: 60 TABLET | Refills: 0 | Status: SHIPPED | OUTPATIENT
Start: 2021-03-16 | End: 2021-04-16 | Stop reason: SDUPTHER

## 2021-03-16 NOTE — TELEPHONE ENCOUNTER
Message was review with pt    Please refill her levothyroxine for only 30 days since she is getting her labs done next week, thank you

## 2021-03-16 NOTE — TELEPHONE ENCOUNTER
Pt called requesting  A refill for Levothyroxine 25 mcg, also she had labs done order by her by her other doctor and would like Dr Jared Woody to review her results    I did call pt back, no answer left message to call us back  To clarify dosage for her Levothyroxine dosage , we have that she is taking Levothyroxine 50 mcg

## 2021-03-16 NOTE — TELEPHONE ENCOUNTER
Please let her know that those labs from weight management did not include anything that I ordered  She will need the dexamethasone suppression test cortisol and repeat TSH that we ordered, but these were not ordered by Bariatrics

## 2021-03-16 NOTE — PSYCH
MEDICATION MANAGEMENT NOTE        [facility]      Name and Date of Birth:  Shonna Fernandes 56 y o  1960 MRN: [id number]    Date of Visit: March 16, 2021    Reason for Visit: Follow-up visit for medication management     SUBJECTIVE:    Shonna Fernandes is a 56 y o  female with past psychiatric history significant for major depressive disorder, generalized anxiety with panic attacks, and ADHD (inattentive type) who was personally seen and evaluated today at the [address] [place] [address] outpatient clinic for follow-up and medication management  Tati Rushing presents as mildly anxious yet pleasant and cooperative  Her thoughts are organized, linear, and goal-directed and she completes psychiatric assessment without difficulty  Tati Rushing continues to recover from COVID-19 and states that she finally is close to feeling at baseline  Her respiratory mechanics have normalized but she continues to endorse fatigue and low energy  Tati Rushing endorses adherence to current psychotropic medication regimen that consists of Pristiq, PRN Xanax (Rx by PCP), Adderall and Trazodone  She currently denies any adverse medication side effects  Tati Rushing reports that she has been without Xanax for several weeks in an attempt to wean herself off of the agent  She requests that this provider refill the medication but she was informed that I will not prescribed 2 control substances and she should seek that medication, if she chooses, from the provider that originally prescribed it  She was receptive  Tati Rushing is pleased to report that she is "pushing forward" regarding her mental health and is starting to engage in behavioral activation  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 and is fighting this to be pleasurable as it provides a sense of "purpose"  Several months ago, Tati Rushing was switched from IR formulation of Adderall to XR to assist with compliance   Unfortunately, she does not find the same benefit from the XR formulation and requests that she be changed back to BID dosing which was granted  Jeniffer Hall endorses inattentiveness, lack of focus, forgetfulness, and poor concentration with the XR formulation  She denies worsening anxiety with any stimulant and believes her anxiety is "less" when the stimulant allows for productivity  Jeniffer Hall continues to endorse baseline anxiety that is multifactorial in origin  She continues to feel powerless and "without control" regarding COVID-19 pandemic and her 's legal issues  She states that the FBI came to her house yesterday and this was the catalyst for worsening anxiety  As such, she endorses irrational nervousness and feelings of impending doom  She currently denies panic symptomatology  FRANCO-7 score obtained during today's visit was 6  In addition to ongoing anxiety and symptomatology suggestive of ADHD, Jeniffer Hall endorses several neurovegetative symptoms of depression  She sleep is currently fragmented and non-restorative  In addition to full psychiatric interview today, I also reviewed documentation from recent PCP visits and visit with sleep specialist  There is concern regarding the possibility of NICA  She is scheduled for a sleep study on April 14th  Extensive psychoeducation was provided today regarding the risks associated with unmanaged NICA to which she voiced understanding  Patient was also educated that unmanaged NICA can lead to a plethora of psychiatric symptoms, most of which she is currently experiencing  Psychotropic medications are often less effective if NICA is not managed and Jeniffer Hall again, voiced understanding and appears committed to obtaining this sleep study  Her appetite is fair  Her energy and motivation are limited  She denies daily crying spells or new-onset anhedonia, but admits to "being in a hole" for several days per week  She denies hopelessness, worthlessness, or guilt   She vehemently denies SI/HI or any plans to harm herself or others  She is future-oriented and demonstrates self-preservation  PHQ-9 score obtained during today's visit was 7  Acutely, John Connolly denies any recent episodes of elevated/expansive mood, lengthy periods without sleep, grandiosity, or intense and prolonged irritability associated with an underlying bipolar disorder  John Connolly currently denies acute perceptual disturbances and does not exhibit objective evidence of hayde psychosis  She offers no further complaints  Current Rating Scores:     Current PHQ-9   PHQ-9 Score (since 2/13/2021)     PHQ-9 Score  7        Current FRANCO-7 is   FRANCO-7 Flowsheet Screening      Most Recent Value   Over the last two weeks, how often have you been bothered by the following problems? Feeling nervous, anxious, or on edge  1   Not being able to stop or control worrying  1   Worrying too much about different things  1   Trouble relaxing   1   Being so restless that it's hard to sit still  1   Becoming easily annoyed or irritable   0   Feeling afraid as if something awful might happen  1   How difficult have these problems made it for you to do your work, take care of things at home, or get along with other people? Somewhat difficult   FRANCO Score   6            Review Of Systems:      Constitutional feeling poorly, feeling tired, low energy and as noted in HPI   ENT negative   Cardiovascular negative   Respiratory shortness of breath and dyspnea on exertion   Gastrointestinal abdominal discomfort   Genitourinary negative   Musculoskeletal myalgias   Integumentary negative   Neurological decreased memory   Endocrine negative   Other Symptoms none, all other systems are negative       Past Psychiatric History: (unchanged information from previous note copied and italicized) - Information that is bolded has been updated       Inpatient psychiatric admissions: Denies  Prior outpatient psychiatric linkage: Previously linked with Dr Jaqui Pang via [facility]  Past/current psychotherapy: Denies  History of suicidal attempts/gestures: Denies  History of violence/aggressive behaviors: Denies  Psychotropic medication trials: Seroquel (sleep - too sedating), Zoloft (minimal benefit, gave her nightmares), Adderall, Xanax, Trazodone, Pristiq, Hydroxyzine (now)  Substance abuse inpatient/outpatient rehabilitation: Denies     Substance Abuse History: (unchanged information from previous note copied and italicized) - Information that is bolded has been updated       Denies a history of ETOH or illicit substance abuse  No prior DWIs or DUIs  No prior involvement with law enforcement secondary to substance use      Social History: (unchanged information from previous note copied and italicized) - Information that is bolded has been updated       Developmental: Denies a history of milestone/developmental delay  Denies a history of in-utero exposure to toxins/illicit substances   There is no documented history of IEP or need for special education  Education: high school diploma/GED  Marital history:   Living arrangement, social support:  and extended family, has 2 children (son 30 y/o, daughter 45 y/o)  Occupational History: Recently terminated from employment at [employer] secondary to COVID-23 - 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  Access to firearms: Denies direct access to weapons/firearms  Jeri Acosta has no history of arrests or violence with a deadly weapon       Traumatic History: (unchanged information from previous note copied and italicized) - Information that is bolded has been updated        Abuse:none is reported  Other Traumatic Events:  being incarcerated was difficult         Past Medical History:    Past Medical History:   Diagnosis Date    Anxiety     Anxiety     Colon polyp     Panic attacks      Past Medical History Pertinent Negatives:   Diagnosis Date Noted    Colon cancer ([facility] [address] ) 01/04/2021    Osteoporosis 2018     Past Surgical History:   Procedure Laterality Date     SECTION  1984    COLONOSCOPY      HYSTERECTOMY       No Known Allergies    Substance Abuse History:    Social History     Substance and Sexual Activity   Alcohol Use Yes    Comment: Rare- 1 every few weeks      Social History     Substance and Sexual Activity   Drug Use No    Types: Cocaine    Comment: coccaine use  as per allscripts       Social History:    Social History     Socioeconomic History    Marital status: /Civil Union     Spouse name: Not on file    Number of children: Not on file    Years of education: Not on file    Highest education level: Not on file   Occupational History    Not on file   Social Needs    Financial resource strain: Not on file    Food insecurity     Worry: Not on file     Inability: Not on file   [employer] needs     Medical: Not on file     Non-medical: Not on file   Tobacco Use    Smoking status: Current Every Day Smoker     Packs/day: 1 00     Years: 40 00     Pack years: 40 00     Types: Cigarettes     Start date: 1980    Smokeless tobacco: Never Used    Tobacco comment: since age 23   Substance and Sexual Activity    Alcohol use: Yes     Comment: Rare- 1 every few weeks     Drug use: No     Types: Cocaine     Comment: coccaine use  as per allscripts    Sexual activity: Not on file   Lifestyle    Physical activity     Days per week: Not on file     Minutes per session: Not on file    Stress: Not on file   Relationships    Social connections     Talks on phone: Not on file     Gets together: Not on file     Attends Advent service: Not on file     Active member of club or organization: Not on file     Attends meetings of clubs or organizations: Not on file     Relationship status: Not on file    Intimate partner violence     Fear of current or ex partner: Not on file     Emotionally abused: Not on file     Physically abused: Not on file     Forced sexual activity: Not on file   Other Topics Concern    Not on file   Social History Narrative        Has a son and a daughter in [place] - 3 grandchildren    Working - at [employer] in a [employer] while  incarcerated almost 2 yrs        Drinks Coffee: 3-4 cups       Family Psychiatric History:     Family History   Problem Relation Age of Onset    Lung cancer Mother     Ovarian cancer Maternal Grandmother     Glaucoma Paternal Grandmother     Asthma Son     Hypothyroidism Daughter     Thyroid disease Daughter     Alcohol abuse Neg Hx     Depression Neg Hx     Drug abuse Neg Hx     Substance Abuse Neg Hx     Diabetes Neg Hx     Hypertension Neg Hx     Heart disease Neg Hx     Stroke Neg Hx        History Review: The following portions of the patient's history were reviewed and updated as appropriate: allergies, current medications, past family history, past medical history, past social history, past surgical history and problem list          OBJECTIVE:     Vital signs in last 24 hours: There were no vitals filed for this visit      Mental Status Evaluation:    Appearance age appropriate, casually dressed, dressed appropriately, looks stated age, overweight   Behavior pleasant, cooperative, mildly anxious, good eye contact   Speech normal rate, normal volume, normal pitch, fluent, clear   Mood dysphoric, anxious   Affect constricted, mood-congruent   Thought Processes organized, goal directed, normal rate of thoughts, normal abstract reasoning   Associations intact associations   Thought Content no overt delusions   Perceptual Disturbances: no auditory hallucinations, no visual hallucinations   Abnormal Thoughts  Risk Potential Suicidal ideation - None  Homicidal ideation - None  Potential for aggression - No   Orientation oriented to person, place, time/date and situation   Memory recent and remote memory grossly intact   Consciousness alert and awake   Attention Span Concentration Span attention span and concentration are age appropriate   Intellect appears to be of average intelligence   Insight intact and good   Judgement intact and good   Muscle Strength and  Gait normal muscle strength and normal muscle tone, normal gait and normal balance   Motor activity no abnormal movements   Language no difficulty naming common objects, no difficulty repeating a phrase   Fund of Knowledge adequate knowledge of current events  adequate fund of knowledge regarding past history  adequate fund of knowledge regarding vocabulary    Pain none   Pain Scale 0       Laboratory Results: I have personally reviewed all pertinent laboratory/tests results    Recent Labs (last 2 months):   Lab on 03/08/2021   Component Date Value    Insulin, Fasting 03/08/2021 6 6     Hemoglobin A1C 03/08/2021 5 4     EAG 03/08/2021 108     Magnesium 03/08/2021 2 0    Lab on 02/09/2021   Component Date Value    Lipase 02/09/2021 108     TSH 3RD GENERATON 02/09/2021 8 216*   Admission on 01/29/2021, Discharged on 01/29/2021   Component Date Value    WBC 01/29/2021 5 60     RBC 01/29/2021 4 97     Hemoglobin 01/29/2021 14 0     Hematocrit 01/29/2021 41 9     MCV 01/29/2021 84     MCH 01/29/2021 28 2     MCHC 01/29/2021 33 4     RDW 01/29/2021 15 5*    MPV 01/29/2021 9 3     Platelets 93/31/8020 189     nRBC 01/29/2021 0     Neutrophils Relative 01/29/2021 36*    Immat GRANS % 01/29/2021 0     Lymphocytes Relative 01/29/2021 54*    Monocytes Relative 01/29/2021 8     Eosinophils Relative 01/29/2021 2     Basophils Relative 01/29/2021 0     Neutrophils Absolute 01/29/2021 2 02     Immature Grans Absolute 01/29/2021 0 01     Lymphocytes Absolute 01/29/2021 2 96     Monocytes Absolute 01/29/2021 0 47     Eosinophils Absolute 01/29/2021 0 13     Basophils Absolute 01/29/2021 0 01     Sodium 01/29/2021 138     Potassium 01/29/2021 4 2     Chloride 01/29/2021 104     CO2 01/29/2021 24     ANION GAP 01/29/2021 10  BUN 01/29/2021 22     Creatinine 01/29/2021 0 76     Glucose 01/29/2021 97     Calcium 01/29/2021 8 5     AST 01/29/2021 24     ALT 01/29/2021 41     Alkaline Phosphatase 01/29/2021 71     Total Protein 01/29/2021 7 3     Albumin 01/29/2021 3 6     Total Bilirubin 01/29/2021 0 16*    eGFR 01/29/2021 84     Lipase 01/29/2021 477*    Troponin I 01/29/2021 <0 02     Ventricular Rate 01/29/2021 58     Atrial Rate 01/29/2021 58     MT Interval 01/29/2021 160     QRSD Interval 01/29/2021 90     QT Interval 01/29/2021 416     QTC Interval 01/29/2021 408     P Axis 01/29/2021 70     QRS Axis 01/29/2021 84     T Wave Axis 01/29/2021 80    Orders Only on 01/26/2021   Component Date Value    SARS-CoV-2 01/26/2021 Positive*       Suicide/Homicide Risk Assessment:    Risk of Harm to Self:  The following ratings are based on assessment at the time of the interview, observation over the last 6 months and review of records  Demographic risk factors include: , age: over 48 or older  Historical Risk Factors include: history of depression, history of anxiety  Recent Specific Risk Factors include: current depressive symptoms, current anxiety symptoms  Protective Factors: no current suicidal ideation, able to manage anger well, access to mental health treatment, being a parent, being , compliant with medications, compliant with mental health treatment, connection to community, connection to own children, effective coping skills, effective decision-making skills, effective problem solving skills, good self-esteem, having a desire to be alive, having a sense of purpose or meaning in life, healthy fear of risky behaviors and pain, impulse control, medical compliance, no substance use problems, opportunities to participate in community, personal beliefs about the meaning and value of life, resiliency, restricted access to lethal means, stable living environment, stable job, sense of determination, sense of importance of health and wellness, sense of personal control, sobriety, supportive family, supportive friends  Weapons: none  The following steps have been taken to ensure weapons are properly secured: not applicable  Based on today's assessment, Charla Callaway presents the following risk of harm to self: none    Risk of Harm to Others: The following ratings are based on assessment at the time of the interview, observation over the last 6 months and review of records  Demographic Risk Factors include: none  Historical Risk Factors include: none  Recent Specific Risk Factors include: none  Protective Factors: no current homicidal ideation  Weapons: none  The following steps have been taken to ensure weapons are properly secured: not applicable  Based on today's assessment, Charla Callaway presents the following risk of harm to others: none    The following interventions are recommended: no intervention changes needed      Lethality Statement:    Unchanged from previous assessment      Assessment/Plan:     Marva Clayton is a 56 y o  female with past psychiatric history significant for major depressive disorder, generalized anxiety with panic attacks, and ADHD (inattentive type) who was personally seen and evaluated today at the [address] [place] [address] outpatient clinic for follow-up and medication management  Jeri endorses worsening anxiety, "panic attacks", and intermittent depressive symptomatology over the past 10 years in the context of numerous psychosocial stressors including financial instability, familial discord, and homelessness  Charla Callaway states that her life was "normal" until her  was arrested and incarcerated  As such, she has been increasingly more depressed and notices a profound increase in debilitating anxiety and subsequent panic attacks  Aggravating factors include familial stress, poor finances, and "being out in society"   Alleviating factors include mindfulness and self-help books about guided imagery  Her PCP prescribed Xanax 1mg PO BID with limited benefit  In addition to anxiousness, Heaven Iglesias endorses several neurovegetative symptoms of depression  Her sleep is poor and her appetite is limited  She endorses a 30lb weight gain over the last year without significant dietary intake  Her energy is "nonexhistent" and her ability to concentrate is quite limited  Her PCP prescribed Adderall 5mg PO BID given her history of ADHD with fair benefit  Heaven Iglesias endorses anhedonia, limited interaction with grandchildren, and episodic hopelessness and guilt  In the past, Heaven Iglesias was treated by Dr Cami Mccall with Zoloft which was optimized to 200mg  She endorsed limited improvement in mood and severe nightmares so this agent was discontinued  Jeri denies acute or chronic symptomatology suggestive of tiffany or hypomania and she denies perceptual disturbances such as A/V hallucinations, paranoia, ideas of reference or delusional thoughts  She denies recent ETOH or illicit substance abuse but does smoke cigarretes      Psychopharmacologically, extensive psychoeducation was provided to Heaven Iglesias about medication options available and future plans of cross-tapering  She was receptive  At this juncture, will transition from XR Adderall to IR formulation for assistance with more immediate focus and less impact on difficulties with initiating sleep  Will discontinue Xanax to limit worsening of respiratory mechanics and rebound anxiety  Will titrate Trazodone to 150mg QHS for sleep assistance and mood  She was encouraged to adhere to April 14th sleep study   Risks/benefits/alternativies to treatment discussed, including a myriad of potential adverse medication side effects, to which Heaven Iglesias voiced understanding and consented fully to treatment          DSM-V Diagnoses:      1 ) Generalized anxiety disorder with panic attacks  2 ) Major Depressive Disorder, recurrent episode, moderate  3 ) ADHD, adult type  4 ) Insomnia - New        Treatment Recommendations/Precautions:     1 ) Generalized anxiety disorder with panic attacks   - Hx of Zoloft trial with limited benefit  - Continue Desvenlafaxine (Pristiq) 100mg PO Daily - educated on options/future plans to cross taper to another agent   - Discontinue Xanax 1mg PO BID (Rx by PCP)  - Start Hydroxyzine 25mg Q8H PRN for breakthrough anxiety  - Psychoeducation provided regarding the importance of exercise and healthy dietary choices and their impact on mood, energy, and motivation  - Counseled to avoid ETOH, illict substances, and nicotine secondary to the detrimental effects of these substances on mental and physical health  - Encouraged to engage in non-verbal forms of therapy such as art therapy, music therapy, and mindfulness     2 ) Major Depressive Disorder, recurrent episode, moderate  - Hx of Zoloft trial with limited benefit  - Continue Desvenlafaxine (Pristiq) 100mg PO Daily - educated on options/future plans to cross taper to another agent   - Discontinue Xanax 1mg PO BID (Rx by PCP)  - Start Hydroxyzine 25mg Q8H PRN for breakthrough anxiety  - Psychoeducation provided regarding the importance of exercise and healthy dietary choices and their impact on mood, energy, and motivation  - Counseled to avoid ETOH, illict substances, and nicotine secondary to the detrimental effects of these substances on mental and physical health  - Encouraged to engage in non-verbal forms of therapy such as art therapy, music therapy, and mindfulness  - Discussed the bio-psycho-social model to treatment and therapeutic exercises/interventions were attempted to cognitively restructure thoughts     3 ) ADHD, adult type  - Originally prescribed by PCP - Adderall 5mg PO BID  - Discontinue Adderall XR 20mg Daily   - Start Adderall IR 10mg BID to limit difficulties with sleep initiation and to assist with early AM focus  - Monitor for adverse medication reactions     4 ) Insomnia  - Currently seeing sleep medicine  - Sleep Study for NICA on 4/14/2021  - Increase Trazodone 100mg QHS to 150mg QHS to assist with sleep and mood         Medication management every 8 weeks  Aware of need to follow up with family physician for medical issues  Aware of 24 hour and weekend coverage for urgent situations accessed by calling [facility] main practice number    Medications Risks/Benefits      Risks, Benefits And Possible Side Effects Of Medications:    Risks, benefits, and possible side effects of medications explained to Karena Valdivia including risk of suicidality and serotonin syndrome related to treatment with antidepressants, risks of misuse, abuse or dependence, sedation and respiratory depression related to treatment with benzodiazepine medications, risks of cardiovascular side effects including elevated blood pressure, risk of misuse, abuse or dependence and risk of increased anxiety related to treatment with stimulant medications and risk of impaired next-day mental alertness, complex sleep-related behavior and dependence related to treatment with hypnotic medications  She verbalizes understanding and agreement for treatment  Controlled Medication Discussion:     Karena Valdivia has been filling controlled prescriptions on time as prescribed according to [facility]  Discussed with Karena Valdivia the risks of sedation, respiratory depression, impairment of ability to drive and potential for abuse and addiction related to treatment with benzodiazepine medications  She understands risk of treatment with benzodiazepine medications, agrees to not drive if feels impaired and agrees to take medications as prescribed    Psychotherapy Provided:     Individual psychotherapy provided: Yes  Counseling was provided during the session today for 20 minutes  Medication changes discussed with Karena Valdivia  Medication education provided to Karena Valdivia    Goals discussed during in session: alleviate anxiety, alleviate depression, improve memory and improve sleep  Recent stressor including COVID-19 issues, family conflict, medical illness in family, job stress, medical problems, legal problems, recent medication change, social difficulties, everyday stressors and ongoing anxiety discussed with Jessie Breen  Educated on importance of medication and treatment compliance  Importance of follow up with family physician for medical issues reviewed with Jessie Breen  Reassurance and supportive therapy provided  Note Share Disclaimer:  This note was not shared with the patient due to reasonable likelihood of causing patient harm    Treatment Plan:    Completed and signed during the session: Not applicable - Treatment Plan not due at this session    Susanna Villar MD 03/16/21

## 2021-03-17 ENCOUNTER — OFFICE VISIT (OUTPATIENT)
Dept: GASTROENTEROLOGY | Facility: AMBULARY SURGERY CENTER | Age: 61
End: 2021-03-17
Payer: COMMERCIAL

## 2021-03-17 ENCOUNTER — TELEPHONE (OUTPATIENT)
Dept: PSYCHIATRY | Facility: CLINIC | Age: 61
End: 2021-03-17

## 2021-03-17 VITALS — HEIGHT: 62 IN | RESPIRATION RATE: 16 BRPM | BODY MASS INDEX: 36.8 KG/M2 | WEIGHT: 200 LBS

## 2021-03-17 DIAGNOSIS — K59.04 CHRONIC IDIOPATHIC CONSTIPATION: Primary | ICD-10-CM

## 2021-03-17 PROCEDURE — 99213 OFFICE O/P EST LOW 20 MIN: CPT | Performed by: PHYSICIAN ASSISTANT

## 2021-03-17 RX ORDER — LEVOTHYROXINE SODIUM 0.05 MG/1
50 TABLET ORAL DAILY
Qty: 30 TABLET | Refills: 0 | Status: SHIPPED | OUTPATIENT
Start: 2021-03-17 | End: 2021-04-16

## 2021-03-17 RX ORDER — LINACLOTIDE 145 UG/1
145 CAPSULE, GELATIN COATED ORAL DAILY
Qty: 30 CAPSULE | Refills: 5 | Status: SHIPPED | OUTPATIENT
Start: 2021-03-17 | End: 2022-02-22 | Stop reason: SDUPTHER

## 2021-03-17 NOTE — ASSESSMENT & PLAN NOTE
Patient had 11 very small non adenomatous polyps on recent colonoscopy; symptomatology appears functional   Does have hypothyroidism which appears to play a role although she has been receiving treatment for this and reports persisting constipation nonetheless    She reports her constipation was well controlled with Linzess the past, also reports that she had samples from her primary care provider recently which did provide significant relief of constipation while she was on it    - will prescribe Linzess at 145 mcg daily instead of 72 mcg daily, it appears her insurance may approve this    - advised patient about regular fluid intake, physical activity, fiber intake     -advised she can use MiraLax on an as needed basis, can continue Dulcolax as well    -advised patient she can follow-up as needed, she reports that her last colonoscopy prior to her most recent one did show colon polyps, so would recommend she have colonoscopy in 5 years on that basis

## 2021-03-17 NOTE — PROGRESS NOTES
Follow-up Note -  [facility]  Emily Mandujano 1960 56 y o  female         Reason:  Follow-up; chronic constipation    HPI:  58-year-old female with history of hypothyroidism who presents for follow-up, she was last seen in our office with myself in November for follow-up of chronic constipation, also regarding history of colon polyps  At that time she had only recently started treatment for hypothyroidism, which was thought to be related to some weight gain that she had reported recently  She was scheduled for colonoscopy which was performed 01/04/2021 with Dr Ruthy Pendleton; this noted a total of 11 polyps removed from various areas of the colon, all measuring smaller than 5 mm, but since the pathologies all revealed benign colonic tissue or hyperplastic polyp) no adenomas), she was recommended for repeat in 10 years  She had mentioned having been controlled well with her constipation on Linzess in the past, but her insurance stopped covering it  Amitiza was prescribed as an alternative  At this time, she tells me that her insurance denied Amitiza as well, so she has been trying to manage with Dulcolax on an as needed basis  She actually shows me a letter of denial from her insurance company, which seems to suggest that her prescription for 72 mcg Linzess daily was not covered because it was not the approved dose (145 mcg) for chronic idiopathic constipation  Currently having bowel movements about once every 4-5 days, with variable consistency  She also is noting some hemorrhoids which intermittently cause some discomfort, but she is not having any rectal bleeding  No unexpected weight loss, no nausea or vomiting, no abdominal pain, no nausea or vomiting, no acid reflux or heartburn  She said she tried MiraLax in the past which did not seem to do much  She has been seeing an endocrinologist for her hypothyroidism, and her levothyroxine dose was increased about a month ago  REVIEW OF SYSTEMS:      CONSTITUTIONAL: Denies any fever, chills, or rigors  Good appetite, and no recent weight loss  HEENT: No earache or tinnitus  Denies hearing loss or visual disturbances  CARDIOVASCULAR: No chest pain or palpitations  RESPIRATORY: Denies any cough, hemoptysis, shortness of breath or dyspnea on exertion  GASTROINTESTINAL: As noted in the History of Present Illness  GENITOURINARY: No problems with urination  Denies any hematuria or dysuria  NEUROLOGIC: No dizziness or vertigo, denies headaches  MUSCULOSKELETAL: Denies any muscle or joint pain  SKIN: Denies skin rashes or itching  ENDOCRINE: Denies excessive thirst  Denies intolerance to heat or cold  PSYCHOSOCIAL: Denies depression or anxiety  Denies any recent memory loss       Past Medical History:   Diagnosis Date    Anxiety     Anxiety     Colon polyp     Panic attacks     Thyroid disease       Past Surgical History:   Procedure Laterality Date     SECTION      COLONOSCOPY      HYSTERECTOMY       Social History     Socioeconomic History    Marital status: /Civil Union     Spouse name: Not on file    Number of children: Not on file    Years of education: Not on file    Highest education level: Not on file   Occupational History    Not on file   Social Needs    Financial resource strain: Not on file    Food insecurity     Worry: Not on file     Inability: Not on file   [employer] needs     Medical: Not on file     Non-medical: Not on file   Tobacco Use    Smoking status: Current Every Day Smoker     Packs/day: 1 00     Years: 40 00     Pack years: 40 00     Types: Cigarettes     Start date: 1980    Smokeless tobacco: Never Used    Tobacco comment: since age 23   Substance and Sexual Activity    Alcohol use: Yes     Comment: Rare- 1 every few weeks     Drug use: No     Types: Cocaine     Comment: coccaine use  as per allsclaura    Sexual activity: Not on file   Lifestyle  Physical activity     Days per week: Not on file     Minutes per session: Not on file    Stress: Not on file   Relationships    Social connections     Talks on phone: Not on file     Gets together: Not on file     Attends Confucianist service: Not on file     Active member of club or organization: Not on file     Attends meetings of clubs or organizations: Not on file     Relationship status: Not on file    Intimate partner violence     Fear of current or ex partner: Not on file     Emotionally abused: Not on file     Physically abused: Not on file     Forced sexual activity: Not on file   Other Topics Concern    Not on file   Social History Narrative        Has a son and a daughter in [place] - 3 grandchildren    Working - at [employer] in [employer] while  incarcerated almost 2 yrs        Drinks Coffee: 3-4 cups     Family History   Problem Relation Age of Onset    Lung cancer Mother     Ovarian cancer Maternal Grandmother     Glaucoma Paternal Grandmother     Asthma Son     Hypothyroidism Daughter     Thyroid disease Daughter     Alcohol abuse Neg Hx     Depression Neg Hx     Drug abuse Neg Hx     Substance Abuse Neg Hx     Diabetes Neg Hx     Hypertension Neg Hx     Heart disease Neg Hx     Stroke Neg Hx      Patient has no known allergies    Current Outpatient Medications   Medication Sig Dispense Refill    amphetamine-dextroamphetamine (ADDERALL) 10 mg tablet Take 1 tablet (10 mg total) by mouth 2 (two) times a dayMax Daily Amount: 20 mg 60 tablet 0    desvenlafaxine (PRISTIQ) 100 mg 24 hr tablet Take 1 tablet (100 mg total) by mouth daily 30 tablet 2    dexamethasone (DECADRON) 1 mg tablet Take at 11pm the night before fasting blood work at 8am 1 tablet 0    hydrOXYzine HCL (ATARAX) 25 mg tablet Take 1 tablet (25 mg total) by mouth every 8 (eight) hours as needed for anxiety 90 tablet 0    levothyroxine 50 mcg tablet Take 1 tablet (50 mcg total) by mouth daily 90 tablet 0    Multiple Vitamins-Minerals (multivitamin with minerals) tablet Take 1 tablet by mouth daily      traZODone (DESYREL) 150 mg tablet Take 1 tablet (150 mg total) by mouth daily at bedtime 30 tablet 1    ALPRAZolam (XANAX) 1 mg tablet Take 1/2 to 1 tab PO bid prn anxiety (Patient not taking: Reported on 3/17/2021) 60 tablet 0    linaCLOtide (Linzess) 145 MCG CAPS Take 1 capsule (145 mcg total) by mouth daily 30 capsule 5     No current facility-administered medications for this visit  Resp  rate 16, height 5' 1 7" (1 567 m), weight 90 7 kg (200 lb)  PHYSICAL EXAM:      General Appearance:   Alert, cooperative, no distress, appears stated age    HEENT:   Normocephalic, atraumatic, anicteric      Neck:  Supple, symmetrical, trachea midline, no adenopathy;    thyroid: no enlargement/tenderness/nodules; no carotid  bruit or JVD    Lungs:   Clear to auscultation bilaterally; no rales, rhonchi or wheezing; respirations unlabored    Heart[de-identified]   S1 and S2 normal; regular rate and rhythm; no murmur, rub, or gallop  Abdomen:   Soft, non-tender, non-distended; normal bowel sounds; no masses, no organomegaly    Extremities: No edema, erythema, wounds, rashes   Rectal:   Deferred                      Lab Results   Component Value Date    WBC 5 60 01/29/2021    HGB 14 0 01/29/2021    HCT 41 9 01/29/2021    MCV 84 01/29/2021     01/29/2021     Lab Results   Component Value Date    GLUCOSE 84 08/04/2014    CALCIUM 8 5 01/29/2021     08/04/2014    K 4 2 01/29/2021    CO2 24 01/29/2021     01/29/2021    BUN 22 01/29/2021    CREATININE 0 76 01/29/2021     Lab Results   Component Value Date    ALT 41 01/29/2021    AST 24 01/29/2021    ALKPHOS 71 01/29/2021    BILITOT 0 2 08/04/2014     Lab Results   Component Value Date    INR 1 00 08/04/2014    PROTIME 12 6 08/04/2014       Xr Chest 1 View Portable    Result Date: 1/29/2021  Impression: No acute cardiopulmonary disease   Workstation performed: [id number]       ASSESSMENT & PLAN:    Chronic idiopathic constipation  Patient had 11 very small non adenomatous polyps on recent colonoscopy; symptomatology appears functional   Does have hypothyroidism which appears to play a role although she has been receiving treatment for this and reports persisting constipation nonetheless    She reports her constipation was well controlled with Linzess the past, also reports that she had samples from her primary care provider recently which did provide significant relief of constipation while she was on it    - will prescribe Linzess at 145 mcg daily instead of 72 mcg daily, it appears her insurance may approve this    - advised patient about regular fluid intake, physical activity, fiber intake     -advised she can use MiraLax on an as needed basis, can continue Dulcolax as well    -advised patient she can follow-up as needed, she reports that her last colonoscopy prior to her most recent one did show colon polyps, so would recommend she have colonoscopy in 5 years on that basis

## 2021-03-22 NOTE — PROGRESS NOTES
Initial RD session for VLCD completed  Components of diet discussed including ketosis, hydration, possible side effects  2 weeks of product ordered and received  Will f/u 3/29 via email

## 2021-03-24 ENCOUNTER — TELEPHONE (OUTPATIENT)
Dept: BARIATRICS | Facility: CLINIC | Age: 61
End: 2021-03-24

## 2021-03-24 NOTE — TELEPHONE ENCOUNTER
Spoke with antony from Dr Daniel Hernandez office who notes patient is approved to start vlcd with no cardiac contraindications          ----- Message from Denver Donald, RD sent at 3/23/2021  3:21 PM EDT -----  Regarding: VLCD  Candido Noble,    You saw Pricila Mancilla 2/25 for VLCD and in your note you have that she would need approval from cardiology to do VLCD  She has appeared on my schedule for Thursday but I don't see anything from cardiology  Can you check if I missed something and if not how do you want to proceed? Thanks!  Candida

## 2021-03-25 ENCOUNTER — OFFICE VISIT (OUTPATIENT)
Dept: BARIATRICS | Facility: CLINIC | Age: 61
End: 2021-03-25

## 2021-03-25 VITALS — HEIGHT: 62 IN | BODY MASS INDEX: 36.8 KG/M2 | WEIGHT: 200 LBS

## 2021-03-25 DIAGNOSIS — R63.5 ABNORMAL WEIGHT GAIN: ICD-10-CM

## 2021-03-25 PROCEDURE — RECHECK

## 2021-03-25 PROCEDURE — VLCD

## 2021-03-30 ENCOUNTER — TELEPHONE (OUTPATIENT)
Dept: BARIATRICS | Facility: CLINIC | Age: 61
End: 2021-03-30

## 2021-03-30 NOTE — TELEPHONE ENCOUNTER
Contacted patient to follow-up on tolerance of VLCD Diet  Patient states they are doing well  Tolerating meal replacements without difficulty  Consuming at least 80oz of water in addition to water used to mix replacements  Patient not experiencing constipation  Will follow up in 2 weeks

## 2021-04-05 NOTE — PROGRESS NOTES
Weight Management Medical Nutrition Assessment   Is here for VLCD f/u  Current wt: 187 3 lbs  Loss of 12 7 lbs x 2 weeks  No adverse effects noted  Hydration inadequate  Admits she is only drinking 2 bottles of water/day  Reinforced importance of hydration to maintain kidney health and overall hydration  Having some difficulty not chewing at times  Discussed using snacks as needed  Will continue VLCD at this time  Patient seen by Medical Provider in past 6 months:  yes  Requested to schedule appointment with Medical Provider: No    Anthropometric Measurements  Start Weight (#): 200 lbs 3/25/2021  Current Weight (#):  187 3 lbs  TBW % Change from start weight: 6 4%  Ideal Body Weight (#): 107 5 lbs  Goal Weight (#):  lbs    Weight Loss History  Previous weight loss attempts: Counseling with  MD  Exercise  Self Created Diets (Portion Control, Healthy Food Choices, etc )    Food and Nutrition Related History  Wake up:  5   Bed Time: 10    Food Recall  Breakfast:9-10 replacement     Snack:   Lunch: 1-2 replacement  Snack:  Dinner:6:00 replacement  Snack: 8-9 bar    Beverages: water  Volume of beverage intake: 32 oz    Weekends: Same  Cravings: n/a  Trouble area of day: when cooking for     Frequency of Eating out: none currently  Food restrictions: carbs <50 gm while on VLCD   Cooking: self   Food Shopping: self    Physical Activity Intake  Activity:none currently  Frequency:n/a  Physical limitations/barriers to exercise: no intense exercise while on VLCD     Estimated Needs  Energy  [employer] Energy Needs: BMR :8582   2# loss weekly sedentary: 630             2# loss weekly lightly active: 867  Maintenance calories for sedentary activity level: 1630  Protein: 59-73 gm     (1 2-1 5g/kg IBW)  Fluid: 57 oz    (35mL/kg IBW)    Nutrition Diagnosis  Yes; Overweight/obesity  related to Excess energy intake as evidenced by  BMI more than normative standard for age and sex (obesity-grade I 26-30  9) Nutrition Intervention    Nutrition Prescription  Calories:760  Protein: 96 gm  Fluid: 80 oz    Meal Plan (Seth/Pro/Carb)  Breakfast: 200, 27, 10  Snack:  Lunch: 200, 27, 10  Snack:  Dinner: 200, 27, 10  Snack: 160, 15, 13    Nutrition Education:    VLCD  Low carb snack options  Hydration  Physical activity    Nutrition Counseling:  Strategies:as above      Monitoring and Evaluation:  Evaluation criteria:  Energy Intake  Meet protein needs  Maintain adequate hydration  Monitor weekly weight  Meal planning/preparation  Food journal   Decreased portions at mealtimes and snacks  Physical activity     Barriers to learning:none  Readiness to change: Action:  (Changing behavior)  Comprehension: good  Expected Compliance: good

## 2021-04-07 ENCOUNTER — TELEMEDICINE (OUTPATIENT)
Dept: INTERNAL MEDICINE CLINIC | Facility: CLINIC | Age: 61
End: 2021-04-07
Payer: COMMERCIAL

## 2021-04-07 DIAGNOSIS — E78.00 PURE HYPERCHOLESTEROLEMIA: ICD-10-CM

## 2021-04-07 DIAGNOSIS — F33.1 MAJOR DEPRESSIVE DISORDER, RECURRENT EPISODE, MODERATE (HCC): ICD-10-CM

## 2021-04-07 DIAGNOSIS — U07.1 COVID-19: ICD-10-CM

## 2021-04-07 DIAGNOSIS — E66.9 OBESITY, CLASS II, BMI 35-39.9: ICD-10-CM

## 2021-04-07 DIAGNOSIS — F51.01 PRIMARY INSOMNIA: ICD-10-CM

## 2021-04-07 DIAGNOSIS — F41.1 GENERALIZED ANXIETY DISORDER WITH PANIC ATTACKS: ICD-10-CM

## 2021-04-07 DIAGNOSIS — M85.80 OSTEOPENIA, UNSPECIFIED LOCATION: ICD-10-CM

## 2021-04-07 DIAGNOSIS — E03.9 ACQUIRED HYPOTHYROIDISM: Primary | ICD-10-CM

## 2021-04-07 DIAGNOSIS — F90.2 ADHD (ATTENTION DEFICIT HYPERACTIVITY DISORDER), COMBINED TYPE: ICD-10-CM

## 2021-04-07 DIAGNOSIS — F17.200 SMOKING: ICD-10-CM

## 2021-04-07 DIAGNOSIS — R53.83 OTHER FATIGUE: ICD-10-CM

## 2021-04-07 DIAGNOSIS — F41.0 GENERALIZED ANXIETY DISORDER WITH PANIC ATTACKS: ICD-10-CM

## 2021-04-07 DIAGNOSIS — Z71.9 HEALTH COUNSELING: ICD-10-CM

## 2021-04-07 DIAGNOSIS — K59.04 CHRONIC IDIOPATHIC CONSTIPATION: ICD-10-CM

## 2021-04-07 DIAGNOSIS — I77.810 MILD DILATION OF ASCENDING AORTA (HCC): ICD-10-CM

## 2021-04-07 PROBLEM — K59.09 OTHER CONSTIPATION: Status: RESOLVED | Noted: 2021-01-05 | Resolved: 2021-04-07

## 2021-04-07 PROCEDURE — 99214 OFFICE O/P EST MOD 30 MIN: CPT | Performed by: INTERNAL MEDICINE

## 2021-04-07 NOTE — PROGRESS NOTES
Virtual Regular Visit      Assessment/Plan:    Problem List Items Addressed This Visit        Digestive    Chronic idiopathic constipation     Taking Dulcolax daily  Will give Linzess samples  Endocrine    Acquired hypothyroidism - Primary     Seeing Endo, TFTs due  Cardiovascular and Mediastinum    Mild dilation of ascending aorta (HCC)     Will be followed by cardiology  Musculoskeletal and Integument    Osteopenia     Bone density due  Relevant Orders    DXA bone density spine hip and pelvis       Other    ADHD (attention deficit hyperactivity disorder), combined type     On Adderall, per Psych  COVID-19     Intermittent dyspnea, mild  Generalized anxiety disorder with panic attacks     Improved  On Pristiq  Off alprazolam   Per psych  Insomnia     Still on trazodone  Major depressive disorder, recurrent episode, moderate ([address] )     As above  Obesity, Class II, BMI 35-39 9     Seeing bariatrics, on low calorie diet  Other fatigue     Sleep study scheduled  Pure hypercholesterolemia     Lipids due  Smoking     Smoking <1 ppd  Other Visit Diagnoses     Health counseling        Recommend COVID vaccine  Mammogram, PAPs due  Follow up in 4 months or as needed  Reason for visit is f/u  Chief Complaint   Patient presents with    Follow-up    Virtual Regular Visit        Encounter provider Kathie Tyler MD    Provider located at [address] [place] [id number]      Recent Visits  No visits were found meeting these conditions     Showing recent visits within past 7 days and meeting all other requirements     Today's Visits  Date Type Provider Dept   04/07/21 Telemedicine Kathie Tyler, [facility]   Showing today's visits and meeting all other requirements     Future Appointments  No visits were found meeting these conditions  Showing future appointments within next 150 days and meeting all other requirements        The patient was identified by name and date of birth  Tre Phan was informed that this is a telemedicine visit and that the visit is being conducted through [facility] and patient was informed that this is a secure, HIPAA-compliant platform  She agrees to proceed     My office door was closed  No one else was in the room  She acknowledged consent and understanding of privacy and security of the video platform  The patient has agreed to participate and understands they can discontinue the visit at any time  Patient is aware this is a billable service  Subjective  Tre Phan is a 56 y o  female f/u   She reports feeling well  She saw Endocrinology, her thyroid medication was adjusted  She also started seeing bariatric, currently on a low-calorie diet  She has been trying to walk daily  She has her sleep study scheduled later this month  She reports experiencing occasional shortness of breath with exertion, this does not occur  Regularly  She attributes this to weight gain  She reports no other leg ring symptoms from COVID She remains constipated, [place] is not covered by her insurance  She has been taking Dulcolax daily  She continues to smoke, says she is smoking less than a pack of cigarettes daily  She reports anxiety and depression has been good         Past Medical History:   Diagnosis Date    Anxiety     Anxiety     Colon polyp     Panic attacks     Thyroid disease        Past Surgical History:   Procedure Laterality Date     SECTION      COLONOSCOPY      HYSTERECTOMY         Current Outpatient Medications   Medication Sig Dispense Refill    amphetamine-dextroamphetamine (ADDERALL) 10 mg tablet Take 1 tablet (10 mg total) by mouth 2 (two) times a dayMax Daily Amount: 20 mg 60 tablet 0    desvenlafaxine (PRISTIQ) 100 mg 24 hr tablet Take 1 tablet (100 mg total) by mouth daily 30 tablet 2    dexamethasone (DECADRON) 1 mg tablet Take at 11pm the night before fasting blood work at 8am 1 tablet 0    hydrOXYzine HCL (ATARAX) 25 mg tablet Take 1 tablet (25 mg total) by mouth every 8 (eight) hours as needed for anxiety 90 tablet 0    levothyroxine 50 mcg tablet Take 1 tablet (50 mcg total) by mouth daily 30 tablet 0    linaCLOtide (Linzess) 145 MCG CAPS Take 1 capsule (145 mcg total) by mouth daily 30 capsule 5    Multiple Vitamins-Minerals (multivitamin with minerals) tablet Take 1 tablet by mouth daily      traZODone (DESYREL) 150 mg tablet Take 1 tablet (150 mg total) by mouth daily at bedtime 30 tablet 1     No current facility-administered medications for this visit  No Known Allergies    Review of Systems   Constitutional: Positive for activity change and appetite change  Negative for fatigue  HENT: Negative for congestion, ear pain and postnasal drip  Eyes: Negative for visual disturbance  Respiratory: Negative for cough and shortness of breath  Cardiovascular: Negative for chest pain and leg swelling  Gastrointestinal: Negative for abdominal pain, constipation and diarrhea  Genitourinary: Negative for dysuria, frequency and urgency  Musculoskeletal: Negative for arthralgias and myalgias  Skin: Negative for rash and wound  Neurological: Negative for dizziness, numbness and headaches  Psychiatric/Behavioral: Negative for confusion and dysphoric mood  The patient is not nervous/anxious  Video Exam    There were no vitals filed for this visit  Physical Exam  Constitutional:       General: She is not in acute distress  Appearance: Normal appearance  She is not ill-appearing  HENT:      Head: Normocephalic and atraumatic  Eyes:      Pupils: Pupils are equal, round, and reactive to light  Pulmonary:      Effort: Pulmonary effort is normal  No respiratory distress  Neurological:      General: No focal deficit present  Mental Status: She is alert and oriented to person, place, and time  Psychiatric:         Mood and Affect: Mood normal          Behavior: Behavior normal           I spent 16 minutes directly with the patient during this visit      VIRTUAL VISIT DISCLAIMER    Racheal Sahh acknowledges that she has consented to an online visit or consultation  She understands that the online visit is based solely on information provided by her, and that, in the absence of a face-to-face physical evaluation by the physician, the diagnosis she receives is both limited and provisional in terms of accuracy and completeness  This is not intended to replace a full medical face-to-face evaluation by the physician  Racheal Shah understands and accepts these terms  Labs & imaging results reviewed with patient

## 2021-04-09 ENCOUNTER — OFFICE VISIT (OUTPATIENT)
Dept: BARIATRICS | Facility: CLINIC | Age: 61
End: 2021-04-09

## 2021-04-09 VITALS
BODY MASS INDEX: 34.47 KG/M2 | HEIGHT: 62 IN | DIASTOLIC BLOOD PRESSURE: 74 MMHG | SYSTOLIC BLOOD PRESSURE: 102 MMHG | WEIGHT: 187.3 LBS

## 2021-04-09 DIAGNOSIS — K21.9 GASTROESOPHAGEAL REFLUX DISEASE WITHOUT ESOPHAGITIS: ICD-10-CM

## 2021-04-09 DIAGNOSIS — K50.10 CROHN'S COLITIS (HCC): ICD-10-CM

## 2021-04-09 DIAGNOSIS — E66.9 OBESITY, CLASS II, BMI 35-39.9: ICD-10-CM

## 2021-04-09 DIAGNOSIS — R63.5 ABNORMAL WEIGHT GAIN: Primary | ICD-10-CM

## 2021-04-09 PROCEDURE — VLCD

## 2021-04-09 PROCEDURE — RECHECK

## 2021-04-15 DIAGNOSIS — E03.9 ACQUIRED HYPOTHYROIDISM: ICD-10-CM

## 2021-04-16 DIAGNOSIS — F90.2 ADHD (ATTENTION DEFICIT HYPERACTIVITY DISORDER), COMBINED TYPE: ICD-10-CM

## 2021-04-16 RX ORDER — DEXTROAMPHETAMINE SACCHARATE, AMPHETAMINE ASPARTATE, DEXTROAMPHETAMINE SULFATE AND AMPHETAMINE SULFATE 2.5; 2.5; 2.5; 2.5 MG/1; MG/1; MG/1; MG/1
10 TABLET ORAL
Qty: 60 TABLET | Refills: 0 | Status: SHIPPED | OUTPATIENT
Start: 2021-04-16 | End: 2021-06-02 | Stop reason: SDUPTHER

## 2021-04-16 RX ORDER — LEVOTHYROXINE SODIUM 0.05 MG/1
TABLET ORAL
Qty: 30 TABLET | Refills: 2 | Status: SHIPPED | OUTPATIENT
Start: 2021-04-16 | End: 2021-07-19

## 2021-04-16 NOTE — TELEPHONE ENCOUNTER
All documentation, nursing notes, and PDMP website reviewed  Caron Javier called the clinic today and appropriately requested refill of psychotropic medications (Adderall)  Jen Alves was last evaluated on 3/16/21 and plan during that visit was to continue this agent given efficacy and tolerability  Jen Alves did not endorse adverse medication side effects at that time  Jen Alves is currently taking Adderall and tolerating it well  Will refill script today for 30 days  Jen Alves has a scheduled follow-up visit for medication management on 5/17/2021

## 2021-04-19 ENCOUNTER — TRANSCRIBE ORDERS (OUTPATIENT)
Dept: LAB | Facility: CLINIC | Age: 61
End: 2021-04-19

## 2021-04-19 ENCOUNTER — LAB (OUTPATIENT)
Dept: LAB | Facility: CLINIC | Age: 61
End: 2021-04-19
Payer: COMMERCIAL

## 2021-04-19 DIAGNOSIS — E66.9 OBESITY, CLASS II, BMI 35-39.9: ICD-10-CM

## 2021-04-19 DIAGNOSIS — Z79.899 ENCOUNTER FOR LONG-TERM CURRENT USE OF MEDICATION: ICD-10-CM

## 2021-04-19 DIAGNOSIS — E78.00 PURE HYPERCHOLESTEROLEMIA: ICD-10-CM

## 2021-04-19 DIAGNOSIS — R63.5 ABNORMAL WEIGHT GAIN: ICD-10-CM

## 2021-04-19 DIAGNOSIS — E55.9 VITAMIN D DEFICIENCY: ICD-10-CM

## 2021-04-19 DIAGNOSIS — K50.10 CROHN'S COLITIS (HCC): ICD-10-CM

## 2021-04-19 DIAGNOSIS — K21.9 GASTROESOPHAGEAL REFLUX DISEASE WITHOUT ESOPHAGITIS: ICD-10-CM

## 2021-04-19 LAB
25(OH)D3 SERPL-MCNC: 35.9 NG/ML (ref 30–100)
ALBUMIN SERPL BCP-MCNC: 3.9 G/DL (ref 3.5–5)
ALP SERPL-CCNC: 59 U/L (ref 46–116)
ALT SERPL W P-5'-P-CCNC: 25 U/L (ref 12–78)
ANION GAP SERPL CALCULATED.3IONS-SCNC: 10 MMOL/L (ref 4–13)
AST SERPL W P-5'-P-CCNC: 18 U/L (ref 5–45)
BILIRUB SERPL-MCNC: 0.39 MG/DL (ref 0.2–1)
BUN SERPL-MCNC: 17 MG/DL (ref 5–25)
CALCIUM SERPL-MCNC: 8.8 MG/DL (ref 8.3–10.1)
CHLORIDE SERPL-SCNC: 104 MMOL/L (ref 100–108)
CHOLEST SERPL-MCNC: 186 MG/DL (ref 50–200)
CO2 SERPL-SCNC: 26 MMOL/L (ref 21–32)
CREAT SERPL-MCNC: 0.85 MG/DL (ref 0.6–1.3)
GFR SERPL CREATININE-BSD FRML MDRD: 74 ML/MIN/1.73SQ M
GLUCOSE P FAST SERPL-MCNC: 83 MG/DL (ref 65–99)
HDLC SERPL-MCNC: 57 MG/DL
LDLC SERPL CALC-MCNC: 114 MG/DL (ref 0–100)
MAGNESIUM SERPL-MCNC: 2 MG/DL (ref 1.6–2.6)
NONHDLC SERPL-MCNC: 129 MG/DL
POTASSIUM SERPL-SCNC: 4.1 MMOL/L (ref 3.5–5.3)
PROT SERPL-MCNC: 7.9 G/DL (ref 6.4–8.2)
SODIUM SERPL-SCNC: 140 MMOL/L (ref 136–145)
TRIGL SERPL-MCNC: 74 MG/DL
VIT B12 SERPL-MCNC: 493 PG/ML (ref 100–900)

## 2021-04-19 PROCEDURE — 82306 VITAMIN D 25 HYDROXY: CPT

## 2021-04-19 PROCEDURE — 80053 COMPREHEN METABOLIC PANEL: CPT

## 2021-04-19 PROCEDURE — 80061 LIPID PANEL: CPT

## 2021-04-19 PROCEDURE — 82607 VITAMIN B-12: CPT

## 2021-04-19 PROCEDURE — 83735 ASSAY OF MAGNESIUM: CPT

## 2021-04-19 PROCEDURE — 36415 COLL VENOUS BLD VENIPUNCTURE: CPT

## 2021-04-21 NOTE — PROGRESS NOTES
Weight Management Medical Nutrition Assessment   Is here for VLCD f/u  Current wt: 183 8   lbs  Loss of 3 5  lbs x 2 weeks with overall loss of 16 2 lbs x 4 wks (avg 4 lbs/wk)  Reports that she had difficulty over the last week with baking for her granddaughters communion  She reports tasting the batter/products  Likely came out of ketosis  Does admit she had more fatigue and hunger which may have been her body trying to go back into ketosis  She is still struggling with hydration however labs WNL  She also at times has missed a meal replacement  Cautioned her not to do this due to low calories and potential for side effects, complications  Will continue VLCD at this time but likely transition at next visit with PA  Patient seen by Medical Provider in past 6 months:  yes  Requested to schedule appointment with Medical Provider: No    Anthropometric Measurements  Start Weight (#): 200 lbs 3/25/2021  Current Weight (#):  183 8 lbs  TBW % Change from start weight:  8 1%  Ideal Body Weight (#): 107 5 lbs  Goal Weight (#):  lbs    Weight Loss History  Previous weight loss attempts: Counseling with  MD  Exercise  Self Created Diets (Portion Control, Healthy Food Choices, etc )    Food and Nutrition Related History  Wake up:  5   Bed Time: 10    Food Recall  Breakfast:9-10 replacement     Snack:   Lunch: 1-2 replacement  Snack:  Dinner:6:00 replacement  Snack: 8-9 bar    Beverages: water  Volume of beverage intake: 32 oz    Weekends: Same  Cravings: n/a  Trouble area of day: when cooking for     Frequency of Eating out: none currently  Food restrictions: carbs <50 gm while on VLCD   Cooking: self   Food Shopping: self    Physical Activity Intake  Activity:none currently  Frequency:n/a  Physical limitations/barriers to exercise: no intense exercise while on VLCD     Estimated Needs  Energy  [employer] 1900 Energy Needs:  BMR :  8220  2# loss weekly sedentary:  611             2# loss weekly lightly active:  846  Maintenance calories for sedentary activity level:  1611  Protein: 59-73 gm     (1 2-1 5g/kg IBW)  Fluid: 57 oz    (35mL/kg IBW)    Nutrition Diagnosis  Yes; Overweight/obesity  related to Excess energy intake as evidenced by  BMI more than normative standard for age and sex (obesity-grade I 26-30  9)       Nutrition Intervention    Nutrition Prescription  Calories:760  Protein: 96 gm  Fluid: 80 oz    Meal Plan (Seth/Pro/Carb)  Breakfast: 200, 27, 10  Snack:  Lunch: 200, 27, 10  Snack:  Dinner: 200, 27, 10  Snack: 160, 15, 13    Nutrition Education:    VLCD  Low carb snack options  Hydration  Physical activity    Nutrition Counseling:  Strategies:as above      Monitoring and Evaluation:  Evaluation criteria:  Energy Intake  Meet protein needs  Maintain adequate hydration  Monitor weekly weight  Meal planning/preparation  Food journal   Decreased portions at mealtimes and snacks  Physical activity     Barriers to learning:none  Readiness to change: Action:  (Changing behavior)  Comprehension: good  Expected Compliance: good

## 2021-04-23 ENCOUNTER — OFFICE VISIT (OUTPATIENT)
Dept: BARIATRICS | Facility: CLINIC | Age: 61
End: 2021-04-23

## 2021-04-23 VITALS
WEIGHT: 183.8 LBS | HEIGHT: 62 IN | SYSTOLIC BLOOD PRESSURE: 100 MMHG | BODY MASS INDEX: 33.82 KG/M2 | DIASTOLIC BLOOD PRESSURE: 72 MMHG

## 2021-04-23 DIAGNOSIS — R63.5 ABNORMAL WEIGHT GAIN: ICD-10-CM

## 2021-04-23 PROCEDURE — VLCD

## 2021-04-23 PROCEDURE — 3008F BODY MASS INDEX DOCD: CPT | Performed by: INTERNAL MEDICINE

## 2021-04-23 PROCEDURE — RECHECK

## 2021-04-30 ENCOUNTER — TELEPHONE (OUTPATIENT)
Dept: PSYCHIATRY | Facility: CLINIC | Age: 61
End: 2021-04-30

## 2021-04-30 DIAGNOSIS — F51.01 PRIMARY INSOMNIA: Primary | ICD-10-CM

## 2021-04-30 RX ORDER — TRAZODONE HYDROCHLORIDE 100 MG/1
200 TABLET ORAL
Qty: 60 TABLET | Refills: 1 | Status: SHIPPED | OUTPATIENT
Start: 2021-04-30 | End: 2021-07-06 | Stop reason: SDUPTHER

## 2021-04-30 NOTE — TELEPHONE ENCOUNTER
Spoke with Paul Rowe  She reports increased life stressors and had not been sleeping  She has been compliant with Trazadone but it is not effective  She is big with self help but even this is unhelpful  She reports she had an initial appointment with the sleep doctor 4/14/21 but became sick and had to cancel  She has never been to the sleep specialist as of yet  She reports she "has not taken the Adderall because of her sleeping problems" for at least 3 days  She did not think it was prudent to take if she was having trouble sleeping       Please review

## 2021-04-30 NOTE — TELEPHONE ENCOUNTER
Pt called requesting a quick call from you  She has been having trouble sleeping the past 3 days and it is finally catching up with her  Her chest if very tight  Looking for advice on what she can do

## 2021-04-30 NOTE — TELEPHONE ENCOUNTER
Donna Hinojosa aware Trazadone increased to 200 mg  Trazadone 100 mg tablets sent to pharmacy and she is to take 2 tablets totaling 200 mg  She verbalized understanding

## 2021-04-30 NOTE — TELEPHONE ENCOUNTER
Hi all,    Can you please contact this patient? She is linked with a sleep specialist, so if she is having insomnia, I would encourage her to contact them for further advice  She should also be encouraged to limited evening Adderal IR as this could worsen anxiety  Thanks

## 2021-05-06 ENCOUNTER — TELEPHONE (OUTPATIENT)
Dept: PSYCHIATRY | Facility: CLINIC | Age: 61
End: 2021-05-06

## 2021-05-10 ENCOUNTER — SOCIAL WORK (OUTPATIENT)
Dept: BEHAVIORAL/MENTAL HEALTH CLINIC | Facility: CLINIC | Age: 61
End: 2021-05-10
Payer: COMMERCIAL

## 2021-05-10 DIAGNOSIS — F39 MOOD DISORDER (HCC): ICD-10-CM

## 2021-05-10 DIAGNOSIS — F41.0 GENERALIZED ANXIETY DISORDER WITH PANIC ATTACKS: ICD-10-CM

## 2021-05-10 DIAGNOSIS — F90.2 ADHD (ATTENTION DEFICIT HYPERACTIVITY DISORDER), COMBINED TYPE: Primary | ICD-10-CM

## 2021-05-10 DIAGNOSIS — F41.1 GENERALIZED ANXIETY DISORDER WITH PANIC ATTACKS: ICD-10-CM

## 2021-05-10 PROCEDURE — 90834 PSYTX W PT 45 MINUTES: CPT | Performed by: COUNSELOR

## 2021-05-10 NOTE — PSYCH
This note was not shared with the patient due to this is a psychotherapy note    Assessment/Plan:      Diagnoses and all orders for this visit:    ADHD (attention deficit hyperactivity disorder), combined type    Generalized anxiety disorder with panic attacks    Mood disorder ([facility] )          Subjective:      Patient ID: Tammy Tolbert is a 56 y o  female  HPI: Linn Cheatham reports her  was incarcerated for 3 years and has been released at least during this time she lost possessions and her home  She would like to work on processing this experience and the impact it had on her life and her children  After  went to senior living she live in hotels for about 2 years  She reports trying to put things back together" now that her  is out of senior living  Pre-morbid level of function and History of Present Illness: Linn Cheatham reports a history of depression and anxiety and has utilized both medication management and therapy  Previous Psychiatric/psychological treatment/year: Linn Cheatham has been seeking MM services through [facility] [place][note 2] for 6 months  Current Psychiatrist/Therapist: Linn Cheatham is currently seeking MM services with Dr Jose Payne at [facility] [place][note 2]  Problem Assessment:     SOCIAL/VOCATION:  Family Constellation (include parents, relationship with each and pertinent Psych/Medical History):     Family History   Problem Relation Age of Onset    Lung cancer Mother     Ovarian cancer Maternal Grandmother     Glaucoma Paternal Grandmother     Asthma Son     Hypothyroidism Daughter     Thyroid disease Daughter     Alcohol abuse Neg Hx     Depression Neg Hx     Drug abuse Neg Hx     Substance Abuse Neg Hx     Diabetes Neg Hx     Hypertension Neg Hx     Heart disease Neg Hx     Stroke Neg Hx        Mother: Mother has passed  She had an "okay" relationship with mother   Spouse:  41 years  Reports relationship good, close, supportive relationship,  Father: Good relationship  Still living    Children: Two adult children  Excellent relationship with her daughter  Some strain with adult son due to his environmental stressors  Other: Alexandria Ceballos reports friends and siblings that are very supportive  She reports she isolated during time when  was incarcerated  Alexandria Ceballos relates best to   she lives with   she does not live alone  Domestic Violence: No past history of domestic violence    Additional Comments related to family/relationships/peer support: Alexandria Ceballos reports close family bonds but reports struggling to manage symptoms of depression    School or Work History (strengths/limitations/needs): Not currently working  Was previously working at [employer] in [facility] until pandemic  She was a stay a home until  went to long term   is currently looking for employement    Her highest grade level achieved was high school diploma      LEISURE ASSESSMENT (Include past and present hobbies/interests and level of involvement (Ex: Group/Club Affiliations): Alexandria Ceballos reports she enjoys crafting flower making, decorating, painting and doing things around the house    her primary language is [place]  Preferred language is [place]  Religions affiliations and level of involvement [employer]  Recently became a member of a new L[employer] and is trying to attend 1-2 times a month   Does spirituality help you cope? Yes     FUNCTIONAL STATUS: There has been a recent change in Alexandria Ceballos ability to do the following: Evelyne Wright learns best by  demonstration and picture    SUBSTANCE ABUSE ASSESSMENT: no substance abuse    HEALTH ASSESSMENT: Alexandria Ceballos reports issues with thyroid and that she gain 65 pounds 1 5 years that was believed to be thyroid related along with stress      LEGAL: No Mental Health Advance Directive or Power of  on file    Risk Assessment:   The following ratings are based on my review of records    Risk of Harm to Self:   Demographic risk factors include denies  Historical Risk Factors include chronic psychiatric problems  Recent Specific Risk Factors include worries about finances or work and diagnosis of depression      Risk of Harm to Others:   Demographic Risk Factors include n/a  Historical Risk Factors include n/a  Recent Specific Risk Factors include n/a    Access to Weapons:   Delgado Begum has access to the following weapons: none  The following steps have been taken to ensure weapons are properly secured: n/a    Based on the above information, the client presents the following risk of harm to self or others:  low    The following interventions are recommended:   no intervention changes    Notes regarding this Risk Assessment: Delgado Begum presents with a low risk of suicide due to no past history of suiciual ideation, intent or plan  She has past history of hospitalized  She reports good protective factors in friends and family          Review Of Systems:     Mood Normal   Behavior Normal    Thought Content Normal   General Normal    Personality Normal   Other Psych Symptoms Normal   Constitutional Normal   ENT Normal   Cardiovascular Normal    Respiratory Normal    Gastrointestinal Normal   Genitourinary Normal    Musculoskeletal Negative   Integumentary Normal    Neurological Normal    Endocrine Normal          Mental status:  Appearance calm and cooperative , adequate hygiene and grooming and good eye contact    Mood mood appropriate   Affect affect appropriate    Speech a normal rate   Thought Processes normal thought processes   Hallucinations no hallucinations present    Thought Content no delusions   Abnormal Thoughts no suicidal thoughts  and no homicidal thoughts    Orientation  oriented to person and place and time   Remote Memory short term memory intact and long term memory intact   Attention Span concentration intact   Intellect Appears to be of Average Intelligence   Fund of Knowledge displays adequate knowledge of current events   Insight Insight intact   Judgement judgment was intact   Muscle Strength Muscle strength and tone were normal   Language no difficulty naming common objects, no difficulty repeating a phrase  and no difficulty writing a sentence    Pain none   Pain Scale 0

## 2021-05-10 NOTE — BH TREATMENT PLAN
Leah Shelley  1960       Date of Initial Treatment Plan: 11/6/21   Date of Current Treatment Plan: 05/10/21    Treatment Plan Number 1     Strengths/Personal Resources for Self Care: Nasir Felix enjoys doing arts and crafts, spending time with family and espcially her grandkids  Nasir Felix is motivated and willing to put in the work to make her life better  n     Diagnosis:   1  ADHD (attention deficit hyperactivity disorder), combined type     2  Generalized anxiety disorder with panic attacks     3  Mood disorder ([facility] )         Area of Needs:  "I need to get through my procrastination," increase use of healthy coping skills     Long Term Goal 1: increase use of time management and schedules to increase motivation    Target Date: 11/6/21  Completion Date: 10/19/21         Short Term Objectives for Goal 1: ATeach and practice time management skills and scheduling to increase motivation and productivity and BProcess through feelings of failure to clarifying and work on greater acceptance and self esteem  Long Term Goal 2: Increase use of healhty coping skill    Target Date: 11/6/21  Completion Date: 10/19/21    Short Term Objectives for Goal 2: Ateach and practice relaxation/meditation techniques to be used in times of need and BIncrease use of self care to at least one self care activity per week  GOAL 1: Modality: Individual 2x per month   Completion Date 11/6/21, Medication Management and The person(s) responsible for carrying out the plan is  client, Leah Shelley, therapist, Meme Rodgers, and Dr Bernard Brito  GOAL 2: Modality:Individual 2x per month   Completion Date 11/6/21, Medication Management and The person(s) responsible for carrying out the plan is  client, Leah Shelley, therapist, Meme Rodgers and Dr Bernard Brito  Behavioral Health Treatment Plan [facility]: Diagnosis and Treatment Plan explained to Sowmya Sers relates understanding diagnosis and is agreeable to Treatment Plan         Client Comments : Please share your thoughts, feelings, need and/or experiences regarding your treatment plan

## 2021-05-17 ENCOUNTER — TELEPHONE (OUTPATIENT)
Dept: OTHER | Facility: OTHER | Age: 61
End: 2021-05-17

## 2021-05-17 ENCOUNTER — IMMUNIZATIONS (OUTPATIENT)
Dept: FAMILY MEDICINE CLINIC | Facility: HOSPITAL | Age: 61
End: 2021-05-17

## 2021-05-17 DIAGNOSIS — Z23 ENCOUNTER FOR IMMUNIZATION: Primary | ICD-10-CM

## 2021-05-17 PROCEDURE — 91300 SARS-COV-2 / COVID-19 MRNA VACCINE (PFIZER-BIONTECH) 30 MCG: CPT

## 2021-05-17 PROCEDURE — 0001A SARS-COV-2 / COVID-19 MRNA VACCINE (PFIZER-BIONTECH) 30 MCG: CPT

## 2021-05-17 NOTE — TELEPHONE ENCOUNTER
Patient calling to cancel her appointment stating that she has to go  her grandchildren this morning  She is available after 3pm today or another day  Please call her back to reschedule  Thank you

## 2021-05-24 ENCOUNTER — SOCIAL WORK (OUTPATIENT)
Dept: BEHAVIORAL/MENTAL HEALTH CLINIC | Facility: CLINIC | Age: 61
End: 2021-05-24
Payer: COMMERCIAL

## 2021-05-24 DIAGNOSIS — F41.0 GENERALIZED ANXIETY DISORDER WITH PANIC ATTACKS: ICD-10-CM

## 2021-05-24 DIAGNOSIS — F33.1 MAJOR DEPRESSIVE DISORDER, RECURRENT EPISODE, MODERATE (HCC): Primary | ICD-10-CM

## 2021-05-24 DIAGNOSIS — F41.1 GENERALIZED ANXIETY DISORDER WITH PANIC ATTACKS: ICD-10-CM

## 2021-05-24 PROCEDURE — 90834 PSYTX W PT 45 MINUTES: CPT | Performed by: COUNSELOR

## 2021-05-24 NOTE — PSYCH
This note was not shared with the patient due to this is a psychotherapy note     Psychotherapy Provided: Individual Psychotherapy 50 minutes     Length of time in session: 50 minutes, follow up in 2 weeks    Encounter Diagnosis     ICD-10-CM    1  Major depressive disorder, recurrent episode, moderate (HCC)  F33 1    2  Generalized anxiety disorder with panic attacks  F41 1     F41 0        Goals addressed in session: Goal 1 and Goal 2     Pain:      none    0    Current suicide risk : Low     D: Clinician met with Alexandria Lin in person for individual therapy  Alexandria Chenisabelsharon reports increase issues with sleep  She stated that some nights she will be up over 2 hours tossing and turning before finally falling asleep for about a hour and then waking back up  Clinician explored Jeri's sleep hygiene and made some suggestions of changes she could make to increase sleep  Alexandria Chenisabelsharon reports that she is taking her medication as prescribed but does not always feel it is helpful but instead may be keeping her up at night  Clinician discussed other possible relaxation strategies to assist with falling asleep such as medication, reading or taking a bath before bed all of which Alexandria Lin is willing to try  A: Alexandria Chenisabelsharon was open and engaged in the session and is willing to try new strategies to get more quality sleep on a more regular basis  P: Continue bi monthly individual therapy  Behavioral Health Treatment Plan [facility]: Diagnosis and Treatment Plan explained to Betito Every relates understanding diagnosis and is agreeable to Treatment Plan   Yes

## 2021-06-02 DIAGNOSIS — F33.1 MAJOR DEPRESSIVE DISORDER, RECURRENT EPISODE, MODERATE (HCC): ICD-10-CM

## 2021-06-02 DIAGNOSIS — F90.2 ADHD (ATTENTION DEFICIT HYPERACTIVITY DISORDER), COMBINED TYPE: ICD-10-CM

## 2021-06-03 RX ORDER — DEXTROAMPHETAMINE SACCHARATE, AMPHETAMINE ASPARTATE, DEXTROAMPHETAMINE SULFATE AND AMPHETAMINE SULFATE 2.5; 2.5; 2.5; 2.5 MG/1; MG/1; MG/1; MG/1
10 TABLET ORAL
Qty: 60 TABLET | Refills: 0 | Status: SHIPPED | OUTPATIENT
Start: 2021-06-03 | End: 2021-07-06 | Stop reason: SDUPTHER

## 2021-06-03 RX ORDER — DESVENLAFAXINE 100 MG/1
100 TABLET, EXTENDED RELEASE ORAL DAILY
Qty: 30 TABLET | Refills: 0 | Status: SHIPPED | OUTPATIENT
Start: 2021-06-03 | End: 2021-07-06 | Stop reason: SDUPTHER

## 2021-06-03 NOTE — TELEPHONE ENCOUNTER
Will provide 30-day refill covering for Dr Cesar ClaytonPA PDMP system checked- filling scripts appropriately

## 2021-06-08 ENCOUNTER — TELEPHONE (OUTPATIENT)
Dept: PSYCHIATRY | Facility: CLINIC | Age: 61
End: 2021-06-08

## 2021-06-14 ENCOUNTER — TELEPHONE (OUTPATIENT)
Dept: PSYCHIATRY | Facility: CLINIC | Age: 61
End: 2021-06-14

## 2021-06-14 NOTE — TELEPHONE ENCOUNTER
----- Message from Yg Navas sent at 6/14/2021  5:17 PM EDT -----  Regarding: No Show  NS: Patient marked as No Show for therapy appointment today  Cancelled appointment out of provider's schedule  Patient l/m to cancel at time of appointment  Please notify me high priority if you would like this appointment added back into schedule to patrica them as No Show (Late Cancellation)

## 2021-06-15 NOTE — TELEPHONE ENCOUNTER
NO-SHOW LETTER MAILED TO Catalino Calderon    ADDRESS: [address] [place] [address]  [id number] [id number]

## 2021-06-28 ENCOUNTER — TELEPHONE (OUTPATIENT)
Dept: PSYCHIATRY | Facility: CLINIC | Age: 61
End: 2021-06-28

## 2021-06-28 NOTE — TELEPHONE ENCOUNTER
----- Message from Jael Rangel sent at 6/28/2021  5:37 PM EDT -----  Regarding: Patient No Show  Isidro Friday [[id number]]  No Showed 06/28/21  for Lizz Carty, [address]  and did not call with proper notice to Cx appt   They are marked as a No Show for today's visit

## 2021-06-28 NOTE — LETTER
21     Zaina Poll   : 1960   [address]  [address]       It is the policy of [facility] to monitor and manage appointments that have been no-showed or cancelled with less than 48-hour notice  This is necessary to ensure that we are able to provide timely access for all patients to our providers  Undue numbers of unutilized appointments delays necessary medical care for all patients  Dear Zaina [place] : We are sorry that you missed your appointment with Dayana Ruiz MA, LPC on 2021  Your health and follow-up care are important to us  We want to make you aware that you do not have another appointment with Dayana Ruiz MA, LPC scheduled  If you have already rescheduled this appointment, please disregard  Please be aware that our office policy states that if you 'no show' or cancel an appointment without providing 48-hours notice, you may be charged a $53 00 fee  Additionally, if you miss two Therapy appointments in a row without prior notice of cancellation, or three or more in a calendar year, you may be discharged from Therapy treatment  We want to bring this to your attention now to prevent an interruption of your care  If you have any questions about this policy, please call us at the number above  If we do not hear from you within 10 business days to make a follow up appointment, we will assume you are no longer interested in care here  Thank you in advance for your cooperation and assistance         Sincerely,      [address] [facility] [address] Support Staff

## 2021-06-30 NOTE — TELEPHONE ENCOUNTER
NO-SHOW LETTER MAILED TO Chin Hickman    ADDRESS: [address] [place] [address]  [address] [facility] [id number]

## 2021-07-06 DIAGNOSIS — F90.2 ADHD (ATTENTION DEFICIT HYPERACTIVITY DISORDER), COMBINED TYPE: ICD-10-CM

## 2021-07-06 DIAGNOSIS — F51.01 PRIMARY INSOMNIA: ICD-10-CM

## 2021-07-06 DIAGNOSIS — F33.1 MAJOR DEPRESSIVE DISORDER, RECURRENT EPISODE, MODERATE (HCC): ICD-10-CM

## 2021-07-06 RX ORDER — DESVENLAFAXINE 100 MG/1
100 TABLET, EXTENDED RELEASE ORAL DAILY
Qty: 30 TABLET | Refills: 0 | Status: SHIPPED | OUTPATIENT
Start: 2021-07-06 | End: 2021-07-16 | Stop reason: SDUPTHER

## 2021-07-06 RX ORDER — TRAZODONE HYDROCHLORIDE 100 MG/1
200 TABLET ORAL
Qty: 60 TABLET | Refills: 1 | Status: SHIPPED | OUTPATIENT
Start: 2021-07-06 | End: 2021-08-10

## 2021-07-06 RX ORDER — DEXTROAMPHETAMINE SACCHARATE, AMPHETAMINE ASPARTATE, DEXTROAMPHETAMINE SULFATE AND AMPHETAMINE SULFATE 2.5; 2.5; 2.5; 2.5 MG/1; MG/1; MG/1; MG/1
10 TABLET ORAL
Qty: 60 TABLET | Refills: 0 | Status: SHIPPED | OUTPATIENT
Start: 2021-07-06 | End: 2021-07-16 | Stop reason: SDUPTHER

## 2021-07-06 NOTE — TELEPHONE ENCOUNTER
PDMP website reviewed  Adrianna Essex has been appropriately adherent to controlled psychotropic medications without evidence of abuse or misuse  As such, will send 30-day refill to pharmacy of choice and follow up as necessary

## 2021-07-16 ENCOUNTER — OFFICE VISIT (OUTPATIENT)
Dept: PSYCHIATRY | Facility: CLINIC | Age: 61
End: 2021-07-16
Payer: COMMERCIAL

## 2021-07-16 DIAGNOSIS — F33.1 MAJOR DEPRESSIVE DISORDER, RECURRENT EPISODE, MODERATE (HCC): Primary | ICD-10-CM

## 2021-07-16 DIAGNOSIS — F41.1 GENERALIZED ANXIETY DISORDER WITH PANIC ATTACKS: ICD-10-CM

## 2021-07-16 DIAGNOSIS — F41.0 GENERALIZED ANXIETY DISORDER WITH PANIC ATTACKS: ICD-10-CM

## 2021-07-16 DIAGNOSIS — F90.2 ADHD (ATTENTION DEFICIT HYPERACTIVITY DISORDER), COMBINED TYPE: ICD-10-CM

## 2021-07-16 DIAGNOSIS — F51.01 PRIMARY INSOMNIA: ICD-10-CM

## 2021-07-16 PROCEDURE — 99213 OFFICE O/P EST LOW 20 MIN: CPT | Performed by: STUDENT IN AN ORGANIZED HEALTH CARE EDUCATION/TRAINING PROGRAM

## 2021-07-16 RX ORDER — DEXTROAMPHETAMINE SACCHARATE, AMPHETAMINE ASPARTATE, DEXTROAMPHETAMINE SULFATE AND AMPHETAMINE SULFATE 2.5; 2.5; 2.5; 2.5 MG/1; MG/1; MG/1; MG/1
10 TABLET ORAL
Qty: 60 TABLET | Refills: 0 | Status: SHIPPED | OUTPATIENT
Start: 2021-07-16 | End: 2021-08-16

## 2021-07-16 RX ORDER — DESVENLAFAXINE 100 MG/1
100 TABLET, EXTENDED RELEASE ORAL DAILY
Qty: 30 TABLET | Refills: 2 | Status: SHIPPED | OUTPATIENT
Start: 2021-07-16 | End: 2021-08-16

## 2021-07-16 NOTE — BH TREATMENT PLAN
TREATMENT PLAN (Medication Management Only)        [facility]    Name and Date of Birth:  Terrell Craig 56 y o  1960  Date of Treatment Plan: July 16, 2021  Diagnosis/Diagnoses:    1  Major depressive disorder, recurrent episode, moderate ([address] )    2  Generalized anxiety disorder with panic attacks    3  ADHD (attention deficit hyperactivity disorder), combined type    4  Primary insomnia      Strengths/Personal Resources for Self-Care: supportive family, supportive friends, taking medications as prescribed, ability to adapt to life changes, ability to communicate needs, ability to communicate well, ability to listen, ability to reason, ability to understand psychiatric illness, average or above intelligence, general fund of knowledge, good physical health, good understanding of illness, independence, motivation for treatment, ability to negotiate basic needs, being resoureceful, sense of humor, special hobby/interest, stable employment, strong latoya, willingness to work on problems  Area/Areas of need (in own words): anxiety symptoms, depressive symptoms, sleep problems, attention and concentration problems, ADHD symptoms  1  Long Term Goal: improvesleep, maintain control of depressive and anxiety, improve concentration and productivity   Target Date:6 months - 1/16/2022  Person/Persons responsible for completion of goal: Liliana Hobbs  2  Short Term Objective (s) - How will we reach this goal?:   A  Provider new recommended medication/dosage changes and/or continue medication(s): continue current medications as prescribed  B  Attend medication management appointments regularly  C  Take psychiatric medications responsibly  D  Start psychotherapy by October 2021  E  Complete sleep study by September 2021  F  Start employment at [employer]  G   Prioritize sleep   Target Date:6 months - 1/16/2022  Person/Persons Responsible for Completion of Goal: Liliana Orozco Towards Goals: stable, continuing treatment  Treatment Modality: medication management every 3 months, continue psychotherapy with [facility] therapist  Review due 180 days from date of this plan: 6 months - 1/16/2022  Expected length of service: ongoing treatment  My Physician/PA/NP and I have developed this plan together and I agree to work on the goals and objectives  I understand the treatment goals that were developed for my treatment  Treatment Plan completed with assistance and input from patient and verbal consent provided  Treatment plan was not signed at time of office visit secondary to COVID-19 social distancing guidelines

## 2021-07-16 NOTE — PSYCH
MEDICATION MANAGEMENT NOTE        [facility]      Name and Date of Birth:  Timur Gonzales 56 y o  1960 MRN: [id number]    Date of Visit: July 16, 2021    Reason for Visit: Follow-up visit for medication management     SUBJECTIVE:    Timur Gonzales is a 56 y o  female with past psychiatric history significant for major depressive disorder, generalized anxiety with panic attacks, ADHD (inattentive type), and insomnia who was personally seen and evaluated today at the [address] [place] [address] outpatient clinic for follow-up and medication management  Simba Reynoso presents as calm, pleasant, and cooperative  She appears brighter in affect compared to previous visits  Her thoughts are organized, linear, and goal-directed and she completes assessment without difficulty  Simba Reynoso endorses ongoing compliance with psychotropic medication regimen consisting of Pristiq, Adderall, PRN Hydroxyzine, and Trazodone for sleep  She denies adverse medication side effects  Simba Reynoso is please to report that she is "starting to get into a groove" and is now pursing employment outside of her [employer] business  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  Simba Reynoso endorses excitement and optimism about this experience  She is fearful that she'll be "too slow" with technology and joint problem solving techniques were utilized  Simba Reynoso currently denies most neurovegetative symptoms of depression, aside from chronically poor sleep  With further optimization of Trazodone last session, she does report improvement in sleep but continues to struggle with sleep maintainance  After lengthy discussion, there appears to be underlying suspicion for NICA  Simba Reynoso was set for a sleep study in April 2021 but did not follow-up and this, no data was collected   She was educated extensively regarding NICA and risk of untreated NICA leading to adverse cardiac outcomes and stroke risk  She was receptive  Prior to next appointment in October, Michelle Sánchez was encouraged to follow-up with sleep medicine as fragmented sleep is likely to impair memory, energy, focus, concentration, and motivation  At the moment, Michelle Sánchez reports adequate energy  She continues to struggle with organizational skills and attentiveness  She vehemently denies lethality concern  She is without SI/HI and has no plans to harm herself or others  She demonstrates self-preservation as evidenced by her commitment to medical and mental health treatment  However, it is disappointing to see that she has no-show to her therapy appointments  Michelle Sánchez is taking Adderall appropriately which assist with functionality  PDMP website revealed, no concerns for abuse or misuse  Michelle Sánchez is without overwhelming anxiety  She is not overtly manic/hypomanic or psychotic  She was agreeable to further optimization of Trazodone for sleep and educated regarding risks of serotonin syndrome  She was agreeable to follow-up in 3 months  She offered no further concerns  Current Rating Scores:     None completed today      Review Of Systems:      Constitutional fluctuating energy level and as noted in HPI   ENT negative   Cardiovascular negative   Respiratory negative   Gastrointestinal negative   Genitourinary negative   Musculoskeletal negative   Integumentary negative   Neurological negative   Endocrine negative   Other Symptoms none, all other systems are negative       Past Psychiatric History: (unchanged information from previous note copied and italicized) - Information that is bolded has been updated       Inpatient psychiatric admissions: Denies  Prior outpatient psychiatric linkage: Previously linked with Dr Deena Obrien via [facility]  Past/current psychotherapy: Denies  History of suicidal attempts/gestures: Denies  History of violence/aggressive behaviors: Denies  Psychotropic medication trials: Seroquel (sleep - too sedating), Zoloft (minimal benefit, gave her nightmares), Adderall, Xanax, Trazodone, Pristiq, Hydroxyzine (now)  Substance abuse inpatient/outpatient rehabilitation: Denies     Substance Abuse History: (unchanged information from previous note copied and italicized) - Information that is bolded has been updated       Denies a history of ETOH or illicit substance abuse  No prior DWIs or DUIs  No prior involvement with law enforcement secondary to substance use      Social History: (unchanged information from previous note copied and italicized) - Information that is bolded has been updated       Developmental: Denies a history of milestone/developmental delay  Denies a history of in-utero exposure to toxins/illicit substances   There is no documented history of IEP or need for special education  Education: high school diploma/GED  Marital history:   Living arrangement, social support:  and extended family, has 2 children (son 28 y/o, daughter 45 y/o)  Occupational History: Recently terminated from employment at [employer] secondary to COVID-23 - 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  Access to firearms: Denies direct access to weapons/firearms  Jeri Acosta has no history of arrests or violence with a deadly weapon       Traumatic History: (unchanged information from previous note copied and italicized) - Information that is bolded has been updated        Abuse:none is reported  Other Traumatic Events:  being incarcerated was difficult         Past Medical History:    Past Medical History:   Diagnosis Date    Anxiety     Anxiety     Colon polyp     Panic attacks     Thyroid disease      Past Medical History Pertinent Negatives:   Diagnosis Date Noted    Colon cancer ([facility] [address] ) 2021    Osteoporosis 2018     Past Surgical History:   Procedure Laterality Date     SECTION  1984    COLONOSCOPY      HYSTERECTOMY       No Known Allergies    Substance Abuse History:    Social History     Substance and Sexual Activity   Alcohol Use Yes    Comment: Rare- 1 every few weeks      Social History     Substance and Sexual Activity   Drug Use No    Types: Cocaine    Comment: coccaine use 1978 as per allscripts       Social History:    Social History     Socioeconomic History    Marital status: /Civil Union     Spouse name: Not on file    Number of children: Not on file    Years of education: Not on file    Highest education level: Not on file   Occupational History    Not on file   Tobacco Use    Smoking status: Current Every Day Smoker     Packs/day: 1 00     Years: 40 00     Pack years: 40 00     Types: Cigarettes     Start date: 1/4/1980    Smokeless tobacco: Never Used    Tobacco comment: since age 23   Vaping Use    Vaping Use: Never used   Substance and Sexual Activity    Alcohol use: Yes     Comment: Rare- 1 every few weeks     Drug use: No     Types: Cocaine     Comment: coccaine use 1978 as per [employer]scri[employer]foolery    Sexual activity: Not on file   Other Topics Concern    Not on file   Social History Narrative        Has a son and a daughter in [place] - 3 grandchildren    Working - at [employer] in a [employer] while  incarcerated almost 2 yrs        Drinks Coffee: 3-4 cups     Social Determinants of Health     Financial Resource Strain:     Difficulty of Paying Living Expenses:    Food Insecurity:     Worried About Running Out of Food in the Last Year:     [address] in the Last Year:    Transportation Needs:     Lack of Transportation (Medical):      Lack of Transportation (Non-Medical):    Physical Activity:     Days of Exercise per Week:     Minutes of Exercise per Session:    Stress:     Feeling of Stress :    Social Connections:     Frequency of Communication with Friends and Family:     Frequency of Social Gatherings with Friends and Family:     Attends Taoist Services:     Active Member of Clubs or Organizations:  Attends Club or Organization Meetings:     Marital Status:    Intimate Partner Violence:     Fear of Current or Ex-Partner:     Emotionally Abused:     Physically Abused:     Sexually Abused:        Family Psychiatric History:     Family History   Problem Relation Age of Onset    Lung cancer Mother     Ovarian cancer Maternal Grandmother     Glaucoma Paternal [de-identified]     Asthma Son     Hypothyroidism Daughter     Thyroid disease Daughter     Alcohol abuse Neg Hx     Depression Neg Hx     Drug abuse Neg Hx     Substance Abuse Neg Hx     Diabetes Neg Hx     Hypertension Neg Hx     Heart disease Neg Hx     Stroke Neg Hx        History Review: The following portions of the patient's history were reviewed and updated as appropriate: allergies, current medications, past family history, past medical history, past social history, past surgical history and problem list          OBJECTIVE:     Vital signs in last 24 hours: There were no vitals filed for this visit      Mental Status Evaluation:    Appearance age appropriate, casually dressed, dressed appropriately, looks stated age   Behavior pleasant, cooperative, calm, fair eye contact   Speech normal rate, normal volume, normal pitch, fluent, clear   Mood normal, euthymic   Affect normal range and intensity, appropriate   Thought Processes organized, logical, goal directed, normal rate of thoughts, normal abstract reasoning   Associations intact associations   Thought Content no overt delusions   Perceptual Disturbances: no auditory hallucinations, no visual hallucinations   Abnormal Thoughts  Risk Potential Suicidal ideation - None  Homicidal ideation - None  Potential for aggression - No   Orientation oriented to person, place, time/date and situation   Memory recent and remote memory grossly intact   Consciousness alert and awake   Attention Span Concentration Span attention span and concentration are age appropriate   Intellect appears to be of average intelligence   Insight intact and good   Judgement intact and good   Muscle Strength and  Gait normal gait and normal balance   Motor activity no abnormal movements   Language no difficulty naming common objects, no difficulty repeating a phrase   Fund of Knowledge adequate knowledge of current events  adequate fund of knowledge regarding past history  adequate fund of knowledge regarding vocabulary    Pain none   Pain Scale 0       Laboratory Results: I have personally reviewed all pertinent laboratory/tests results    Recent Labs (last 2 months):   No visits with results within 2 Month(s) from this visit     Latest known visit with results is:   Lab on 04/19/2021   Component Date Value    Sodium 04/19/2021 140     Potassium 04/19/2021 4 1     Chloride 04/19/2021 104     CO2 04/19/2021 26     ANION GAP 04/19/2021 10     BUN 04/19/2021 17     Creatinine 04/19/2021 0 85     Glucose, Fasting 04/19/2021 83     Calcium 04/19/2021 8 8     AST 04/19/2021 18     ALT 04/19/2021 25     Alkaline Phosphatase 04/19/2021 59     Total Protein 04/19/2021 7 9     Albumin 04/19/2021 3 9     Total Bilirubin 04/19/2021 0 39     eGFR 04/19/2021 74     Cholesterol 04/19/2021 186     Triglycerides 04/19/2021 74     HDL, Direct 04/19/2021 57     LDL Calculated 04/19/2021 114*    Non-HDL-Chol (CHOL-HDL) 04/19/2021 129     Vitamin B-12 04/19/2021 493     Vit D, 25-Hydroxy 04/19/2021 35 9     Magnesium 04/19/2021 2 0        Suicide/Homicide Risk Assessment:    Risk of Harm to Self:  The following ratings are based on assessment at the time of the interview, observation over the last 6 months and review of records  Demographic risk factors include: , age: over 48 or older  Historical Risk Factors include: history of depression, history of anxiety  Recent Specific Risk Factors include: none  Protective Factors: no current suicidal ideation, ability to adapt to change, able to manage anger well, access to mental health treatment, being a parent, being , compliant with medications, compliant with mental health treatment, connection to community, connection to own children, effective decision-making skills, effective problem solving skills, good health, good self-esteem, having a desire to be alive, having a sense of purpose or meaning in life, healthy fear of risky behaviors and pain, impulse control, medical compliance, no substance use problems, opportunities to contribute to community, opportunities to participate in community, responsibilities and duties to others, restricted access to lethal means, stable living environment, stable job, sense of determination, sense of importance of health and wellness, sense of personal control, strong relationships, supportive family, supportive friends  Weapons: none  The following steps have been taken to ensure weapons are properly secured: not applicable  Based on today's assessment, Angela Schreiber presents the following risk of harm to self: low    Risk of Harm to Others:   The following ratings are based on assessment at the time of the interview and observation over the last 6 months   Protective Factors: no current homicidal ideation    The following interventions are recommended: no intervention changes needed      Lethality Statement:    Unchanged from previous assessment      Assessment/Plan:     Sury Clemens a 56 y  o  female with past psychiatric history significant for major depressive disorder, generalized anxiety with panic attacks, ADHD (inattentive type), and insomnia who was personally seen and evaluated today at the [address] [place] [address] E outpatient clinic for follow-up and medication management  Jeri endorses worsening anxiety, "panic attacks", and intermittent depressive symptomatology over the past 10 years in the context of numerous psychosocial stressors including financial instability, familial discord, and homelessness  Poncho Ríos states that her life was "normal" until her  was arrested and incarcerated  As such, she has been increasingly more depressed and notices a profound increase in debilitating anxiety and subsequent panic attacks  Aggravating factors include familial stress, poor finances, and "being out in society"  Alleviating factors include mindfulness and self-help books about guided imagery  Her PCP prescribed Xanax 1mg PO BID with limited benefit  In addition to anxiousness, Poncho Ríos endorses several neurovegetative symptoms of depression  Her sleep is poor and her appetite is limited  She endorses a 30lb weight gain over the last year without significant dietary intake  Her energy is "nonexhistent" and her ability to concentrate is quite limited  Her PCP prescribed Adderall 5mg PO BID given her history of ADHD with fair benefit  Poncho Ríos endorses anhedonia, limited interaction with grandchildren, and episodic hopelessness and guilt  In the past, Poncho Ríos was treated by Dr Atanacio Sicard with Zoloft which was optimized to 200mg  She endorsed limited improvement in mood and severe nightmares so this agent was discontinued  Jeri denies acute or chronic symptomatology suggestive of tiffany or hypomania and she denies perceptual disturbances such as A/V hallucinations, paranoia, ideas of reference or delusional thoughts  She denies recent ETOH or illicit substance abuse but does smoke cigarretes      Psychopharmacologically, Poncho Ríos endorses ongoing mood stability and appropriate control of anxiety  She is without lethality concern  Her main concern is fragmented sleep  Given tolerability and efficacy associated with Trazodone, will titrate to 250mg QHS  She was HIGHLY educated and encouraged to complete sleep study by next visit given concern regarding underling NICA   Risks/benefits/alternativies to treatment discussed, including a myriad of potential adverse medication side effects, to which Poncho Ríos voiced understanding and consented fully to treatment          DSM-V Diagnoses:      1 ) Generalized anxiety disorder with panic attacks  2 ) Major Depressive Disorder, recurrent episode, moderate  3 ) ADHD, adult type  4 ) Insomnia        Treatment Recommendations/Precautions:     1 ) Generalized anxiety disorder with panic attacks   - Hx of Zoloft trial with limited benefit  - Continue Desvenlafaxine (Pristiq) 100mg PO Daily   - Continue Hydroxyzine 25mg Q8H PRN for breakthrough anxiety  - Psychoeducation provided regarding the importance of exercise and healthy dietary choices and their impact on mood, energy, and motivation  - Counseled to avoid ETOH, illict substances, and nicotine secondary to the detrimental effects of these substances on mental and physical health  - Encouraged to engage in non-verbal forms of therapy such as art therapy, music therapy, and mindfulness     2 ) Major Depressive Disorder, recurrent episode, moderate  - Hx of Zoloft trial with limited benefit  - Continue Desvenlafaxine (Pristiq) 100mg PO Daily   - Continue Hydroxyzine 25mg Q8H PRN for breakthrough anxiety     3 ) ADHD, adult type  - Originally prescribed by PCP - Adderall 5mg PO BID  - Continue Adderall IR 10mg BID to limit difficulties with sleep initiation and to assist with early AM focus  - Monitor for adverse medication reactions      4 ) Insomnia  - Currently seeing sleep medicine  - Sleep Study for NICA on 4/14/2021 - missed appointment volitionally    - Needs to see sleep medicine by next visit  - Increase Trazodone 200mg QHS to 250mg QHS to assist with sleep and mood           Medication management every 3 months  Continue psychotherapy with [facility] therapist [address] [facility] [address] aquino need to follow up with family physician for medical issues  Aware of 24 hour and weekend coverage for urgent situations accessed by calling [facility] main practice number    Medications Risks/Benefits      Risks, Benefits And Possible Side Effects Of Medications:    Risks, benefits, and possible side effects of medications explained to Gregor Alvarez including risk of suicidality and serotonin syndrome related to treatment with antidepressants and risks of cardiovascular side effects including elevated blood pressure, risk of misuse, abuse or dependence and risk of increased anxiety related to treatment with stimulant medications  She verbalizes understanding and agreement for treatment  Controlled Medication Discussion:     Gregor Hernandezlexx has been filling controlled prescriptions on time as prescribed according to [address] [place] [address] Drive Monitoring Program    Psychotherapy Provided:     Individual psychotherapy provided: Yes  Counseling was provided during the session today for 17 minutes  Medications, treatment progress and treatment plan reviewed with Gregor Alvarez  Medication changes discussed with Gregor Alvarez  Medication education provided to Gregor Alvarez  Recent stressor including family conflict, job stress, medical problems, recent medication change, everyday stressors and occasional anxiety discussed with Gregor Alvarez  Coping strategies including abstaining from substance use, compliance with medications, contacting a therapist, getting into a good routine, maintain healthy diet, maintain heathy sleeping hygiene, stress reduction, spending time with family and spending time with friends reviewed with Gregor Alvarez  Educated on importance of medication and treatment compliance  Importance of follow up with family physician for medical issues reviewed with Gregor Alvarez  Supportive therapy provided  Treatment Plan:    Completed and signed during the session: Yes - Treatment Plan done but not signed at time of office visit due to:  Plan reviewed in person and verbal consent given due to Juan social distancing    Note Share Disclaimer:      This note was not shared with the patient due to reasonable likelihood of causing patient harm    Juliet Cassidy MD 07/16/21

## 2021-07-19 ENCOUNTER — TELEPHONE (OUTPATIENT)
Dept: ENDOCRINOLOGY | Facility: CLINIC | Age: 61
End: 2021-07-19

## 2021-07-19 ENCOUNTER — TELEPHONE (OUTPATIENT)
Dept: PSYCHIATRY | Facility: CLINIC | Age: 61
End: 2021-07-19

## 2021-07-19 NOTE — TELEPHONE ENCOUNTER
----- Message from Prince Martinez MD sent at 7/19/2021  7:32 AM EDT -----  Regarding: needs appointment  She missed an appointment with me in the Spring  She will need a follow-up  I can be with me or an RYAN   Thanks

## 2021-08-10 DIAGNOSIS — F51.01 PRIMARY INSOMNIA: ICD-10-CM

## 2021-08-10 DIAGNOSIS — F33.1 MAJOR DEPRESSIVE DISORDER, RECURRENT EPISODE, MODERATE (HCC): ICD-10-CM

## 2021-08-10 DIAGNOSIS — F90.2 ADHD (ATTENTION DEFICIT HYPERACTIVITY DISORDER), COMBINED TYPE: ICD-10-CM

## 2021-08-10 RX ORDER — DESVENLAFAXINE 100 MG/1
100 TABLET, EXTENDED RELEASE ORAL DAILY
Qty: 30 TABLET | Refills: 2 | Status: CANCELLED | OUTPATIENT
Start: 2021-08-10

## 2021-08-10 RX ORDER — TRAZODONE HYDROCHLORIDE 100 MG/1
200 TABLET ORAL
Qty: 60 TABLET | Refills: 1 | Status: CANCELLED | OUTPATIENT
Start: 2021-08-10

## 2021-08-10 RX ORDER — DEXTROAMPHETAMINE SACCHARATE, AMPHETAMINE ASPARTATE, DEXTROAMPHETAMINE SULFATE AND AMPHETAMINE SULFATE 2.5; 2.5; 2.5; 2.5 MG/1; MG/1; MG/1; MG/1
10 TABLET ORAL
Qty: 60 TABLET | Refills: 0 | Status: CANCELLED | OUTPATIENT
Start: 2021-08-10

## 2021-08-10 NOTE — TELEPHONE ENCOUNTER
New script for Trazodone sent to pharmacy  Scripts for [employer] and Adderall not sent as these currently have refills

## 2021-08-13 DIAGNOSIS — F90.2 ADHD (ATTENTION DEFICIT HYPERACTIVITY DISORDER), COMBINED TYPE: ICD-10-CM

## 2021-08-13 DIAGNOSIS — F33.1 MAJOR DEPRESSIVE DISORDER, RECURRENT EPISODE, MODERATE (HCC): ICD-10-CM

## 2021-08-16 RX ORDER — DEXTROAMPHETAMINE SACCHARATE, AMPHETAMINE ASPARTATE, DEXTROAMPHETAMINE SULFATE AND AMPHETAMINE SULFATE 2.5; 2.5; 2.5; 2.5 MG/1; MG/1; MG/1; MG/1
TABLET ORAL
Qty: 60 TABLET | Refills: 0 | Status: SHIPPED | OUTPATIENT
Start: 2021-08-16 | End: 2021-09-14 | Stop reason: SDUPTHER

## 2021-08-16 RX ORDER — DESVENLAFAXINE 100 MG/1
TABLET, EXTENDED RELEASE ORAL
Qty: 30 TABLET | Refills: 2 | Status: SHIPPED | OUTPATIENT
Start: 2021-08-16 | End: 2021-11-16 | Stop reason: SDUPTHER

## 2021-08-17 ENCOUNTER — DOCUMENTATION (OUTPATIENT)
Dept: BEHAVIORAL/MENTAL HEALTH CLINIC | Facility: CLINIC | Age: 61
End: 2021-08-17

## 2021-08-17 DIAGNOSIS — F41.0 GENERALIZED ANXIETY DISORDER WITH PANIC ATTACKS: ICD-10-CM

## 2021-08-17 DIAGNOSIS — F90.2 ADHD (ATTENTION DEFICIT HYPERACTIVITY DISORDER), COMBINED TYPE: Primary | ICD-10-CM

## 2021-08-17 DIAGNOSIS — F33.1 MAJOR DEPRESSIVE DISORDER, RECURRENT EPISODE, MODERATE (HCC): ICD-10-CM

## 2021-08-17 DIAGNOSIS — F41.1 GENERALIZED ANXIETY DISORDER WITH PANIC ATTACKS: ICD-10-CM

## 2021-08-17 NOTE — PROGRESS NOTES
Assessment/Plan:      Diagnoses and all orders for this visit:    ADHD (attention deficit hyperactivity disorder), combined type    Generalized anxiety disorder with panic attacks    Major depressive disorder, recurrent episode, moderate ([facility])          Subjective:     Patient ID: Wayne Cary is a 59 y o  female  Outpatient Discharge Summary: Je Aleman was seen for her initial assessment and one follow up appointment before no showing the next two scheduled appointments  Admission Date: 5/10/21  Je Aleman was referred by self and psychiatrist  Discharge Date: 8/17/21    Discharge Diagnosis:    1  ADHD (attention deficit hyperactivity disorder), combined type     2  Generalized anxiety disorder with panic attacks     3  Major depressive disorder, recurrent episode, moderate ([facility]         Treating Physician: Dr Concetta Singh  Treatment Complications: none  Presenting Problem: Je Aleman presented with symptoms of anxiety and depression  She reported that her  had been incarcerated for over three years and since being release she was having trouble transitioning and getting her life back on track  Je Aleman reported desire to build better coping skills, work on time management and process past feelings in order to resolve negative emotions  Course of treatment includes:    medication management, psychoeducation, psychiatric evaluation and individual therapy   Treatment Progress: fair  Criteria for Discharge: two or more unexcused absences for services  Aftercare recommendations include Continue MM services and reengage in IT in the future if needed    Discharge Medications include:  Current Outpatient Medications:     amphetamine-dextroamphetamine (ADDERALL) 10 mg tablet, take 1 tablet by mouth twice a day, Disp: 60 tablet, Rfl: 0    desvenlafaxine (PRISTIQ) 100 mg 24 hr tablet, take 1 tablet by mouth once daily, Disp: 30 tablet, Rfl: 2    dexamethasone (DECADRON) 1 mg tablet, Take at 11pm the [facility], Rfl: 0    hydrOXYzine HCL (ATARAX) 25 mg tablet, Take 1 tablet (25 mg total) by mouth every 8 (eight) hours as needed for anxiety, Disp: 90 tablet, Rfl: 0    levothyroxine 50 mcg tablet, take 1 tablet by mouth once daily, Disp: 30 tablet, Rfl: 2    linaCLOtide (Linzess) 145 MCG CAPS, Take 1 capsule (145 mcg total) by mouth daily, Disp: 30 capsule, Rfl: 5    Multiple Vitamins-Minerals (multivitamin with minerals) tablet, Take 1 tablet by mouth daily, Disp: , Rfl:     traZODone (DESYREL) 100 mg tablet, Take 2 5 tablets (250 mg total) by mouth daily at bedtime, Disp: 75 tablet, Rfl: 2    Prognosis: unknown

## 2021-09-13 DIAGNOSIS — G47.00 INSOMNIA: ICD-10-CM

## 2021-09-13 RX ORDER — TRAZODONE HYDROCHLORIDE 100 MG/1
250 TABLET ORAL
Qty: 75 TABLET | Refills: 2 | Status: SHIPPED | OUTPATIENT
Start: 2021-09-13 | End: 2021-10-21 | Stop reason: SDUPTHER

## 2021-09-14 DIAGNOSIS — F90.2 ADHD (ATTENTION DEFICIT HYPERACTIVITY DISORDER), COMBINED TYPE: ICD-10-CM

## 2021-09-14 RX ORDER — DEXTROAMPHETAMINE SACCHARATE, AMPHETAMINE ASPARTATE, DEXTROAMPHETAMINE SULFATE AND AMPHETAMINE SULFATE 2.5; 2.5; 2.5; 2.5 MG/1; MG/1; MG/1; MG/1
1 TABLET ORAL 2 TIMES DAILY
Qty: 60 TABLET | Refills: 0 | Status: SHIPPED | OUTPATIENT
Start: 2021-09-14 | End: 2021-10-21 | Stop reason: SDUPTHER

## 2021-09-14 NOTE — TELEPHONE ENCOUNTER
PDMP website reviewed  Gregor Alvarez has been appropriately adherent to controlled psychotropic medications without evidence of abuse or misuse  As such, will send 30-day refill to pharmacy of choice and follow up as necessary

## 2021-09-14 NOTE — TELEPHONE ENCOUNTER
9/14 @ 11:16  Requesting refill be sent to pharmacy    Informed patient on message that there is a script on hold for the [address] [place] [address]

## 2021-09-15 ENCOUNTER — TELEPHONE (OUTPATIENT)
Dept: PSYCHIATRY | Facility: CLINIC | Age: 61
End: 2021-09-15

## 2021-09-15 NOTE — TELEPHONE ENCOUNTER
Received fax from pharmacy requesting a P  A  for Amphetamine-Dextroamphetamine 10 mg tabs  Initiated and sent P A  for AMphet-Dextroamphet 10 mg tabs via fax to [facility]

## 2021-09-15 NOTE — TELEPHONE ENCOUNTER
BRANDON Wilcox-Prior authorization process reviewed with expected turn around time of 24-72 hours as per their insurance  Will call when a decision is received

## 2021-09-15 NOTE — TELEPHONE ENCOUNTER
Received fax from [phone]  notifying of APPROVAL of Amphetamine-Dextroamphetamine 10 mg tab effective 9/10/21-9/10/22      Pharmacy made aware      LM for [address]  A  was approved for one year  Expires 9/15/2022   Pharmacy was made aware

## 2021-10-21 DIAGNOSIS — F90.2 ADHD (ATTENTION DEFICIT HYPERACTIVITY DISORDER), COMBINED TYPE: ICD-10-CM

## 2021-10-21 DIAGNOSIS — G47.00 INSOMNIA: ICD-10-CM

## 2021-10-21 RX ORDER — DEXTROAMPHETAMINE SACCHARATE, AMPHETAMINE ASPARTATE, DEXTROAMPHETAMINE SULFATE AND AMPHETAMINE SULFATE 2.5; 2.5; 2.5; 2.5 MG/1; MG/1; MG/1; MG/1
1 TABLET ORAL 2 TIMES DAILY
Qty: 60 TABLET | Refills: 0 | Status: SHIPPED | OUTPATIENT
Start: 2021-10-21 | End: 2021-11-16 | Stop reason: SDUPTHER

## 2021-10-21 RX ORDER — TRAZODONE HYDROCHLORIDE 100 MG/1
250 TABLET ORAL
Qty: 75 TABLET | Refills: 2 | Status: SHIPPED | OUTPATIENT
Start: 2021-10-21 | End: 2021-11-16 | Stop reason: SDUPTHER

## 2021-11-02 DIAGNOSIS — E03.9 ACQUIRED HYPOTHYROIDISM: ICD-10-CM

## 2021-11-02 RX ORDER — LEVOTHYROXINE SODIUM 0.05 MG/1
50 TABLET ORAL DAILY
Qty: 30 TABLET | Refills: 0 | Status: SHIPPED | OUTPATIENT
Start: 2021-11-02 | End: 2022-01-19 | Stop reason: SDUPTHER

## 2021-11-16 ENCOUNTER — TELEPHONE (OUTPATIENT)
Dept: OTHER | Facility: OTHER | Age: 61
End: 2021-11-16

## 2021-11-16 ENCOUNTER — TELEMEDICINE (OUTPATIENT)
Dept: PSYCHIATRY | Facility: CLINIC | Age: 61
End: 2021-11-16
Payer: COMMERCIAL

## 2021-11-16 DIAGNOSIS — F51.01 PRIMARY INSOMNIA: ICD-10-CM

## 2021-11-16 DIAGNOSIS — G47.00 INSOMNIA: ICD-10-CM

## 2021-11-16 DIAGNOSIS — F41.1 GENERALIZED ANXIETY DISORDER WITH PANIC ATTACKS: Primary | ICD-10-CM

## 2021-11-16 DIAGNOSIS — F33.1 MAJOR DEPRESSIVE DISORDER, RECURRENT EPISODE, MODERATE (HCC): ICD-10-CM

## 2021-11-16 DIAGNOSIS — F41.0 GENERALIZED ANXIETY DISORDER WITH PANIC ATTACKS: Primary | ICD-10-CM

## 2021-11-16 DIAGNOSIS — F90.2 ADHD (ATTENTION DEFICIT HYPERACTIVITY DISORDER), COMBINED TYPE: ICD-10-CM

## 2021-11-16 PROCEDURE — 99214 OFFICE O/P EST MOD 30 MIN: CPT | Performed by: STUDENT IN AN ORGANIZED HEALTH CARE EDUCATION/TRAINING PROGRAM

## 2021-11-16 RX ORDER — HYDROXYZINE HYDROCHLORIDE 25 MG/1
25 TABLET, FILM COATED ORAL 2 TIMES DAILY PRN
Qty: 60 TABLET | Refills: 0 | Status: SHIPPED | OUTPATIENT
Start: 2021-11-16 | End: 2022-02-01 | Stop reason: SDUPTHER

## 2021-11-16 RX ORDER — DESVENLAFAXINE 100 MG/1
100 TABLET, EXTENDED RELEASE ORAL DAILY
Qty: 30 TABLET | Refills: 2 | Status: SHIPPED | OUTPATIENT
Start: 2021-11-16 | End: 2021-12-21 | Stop reason: SDUPTHER

## 2021-11-16 RX ORDER — DEXTROAMPHETAMINE SACCHARATE, AMPHETAMINE ASPARTATE, DEXTROAMPHETAMINE SULFATE AND AMPHETAMINE SULFATE 2.5; 2.5; 2.5; 2.5 MG/1; MG/1; MG/1; MG/1
1 TABLET ORAL 2 TIMES DAILY
Qty: 60 TABLET | Refills: 0 | Status: SHIPPED | OUTPATIENT
Start: 2021-11-16 | End: 2021-12-21 | Stop reason: SDUPTHER

## 2021-11-16 RX ORDER — TRAZODONE HYDROCHLORIDE 100 MG/1
250 TABLET ORAL
Qty: 75 TABLET | Refills: 2 | Status: SHIPPED | OUTPATIENT
Start: 2021-11-16 | End: 2021-12-21 | Stop reason: SDUPTHER

## 2021-11-22 DIAGNOSIS — F90.2 ADHD (ATTENTION DEFICIT HYPERACTIVITY DISORDER), COMBINED TYPE: ICD-10-CM

## 2021-11-22 DIAGNOSIS — F33.1 MAJOR DEPRESSIVE DISORDER, RECURRENT EPISODE, MODERATE (HCC): ICD-10-CM

## 2021-11-22 DIAGNOSIS — G47.00 INSOMNIA: ICD-10-CM

## 2021-11-22 RX ORDER — TRAZODONE HYDROCHLORIDE 100 MG/1
250 TABLET ORAL
Qty: 75 TABLET | Refills: 2 | OUTPATIENT
Start: 2021-11-22

## 2021-11-22 RX ORDER — DEXTROAMPHETAMINE SACCHARATE, AMPHETAMINE ASPARTATE, DEXTROAMPHETAMINE SULFATE AND AMPHETAMINE SULFATE 2.5; 2.5; 2.5; 2.5 MG/1; MG/1; MG/1; MG/1
1 TABLET ORAL 2 TIMES DAILY
Qty: 60 TABLET | Refills: 0 | OUTPATIENT
Start: 2021-11-22

## 2021-11-22 RX ORDER — DESVENLAFAXINE 100 MG/1
100 TABLET, EXTENDED RELEASE ORAL DAILY
Qty: 30 TABLET | Refills: 2 | OUTPATIENT
Start: 2021-11-22

## 2021-12-13 ENCOUNTER — TELEPHONE (OUTPATIENT)
Dept: BARIATRICS | Facility: CLINIC | Age: 61
End: 2021-12-13

## 2021-12-21 DIAGNOSIS — G47.00 INSOMNIA: ICD-10-CM

## 2021-12-21 DIAGNOSIS — F90.2 ADHD (ATTENTION DEFICIT HYPERACTIVITY DISORDER), COMBINED TYPE: ICD-10-CM

## 2021-12-21 DIAGNOSIS — F33.1 MAJOR DEPRESSIVE DISORDER, RECURRENT EPISODE, MODERATE (HCC): ICD-10-CM

## 2021-12-21 RX ORDER — DESVENLAFAXINE 100 MG/1
100 TABLET, EXTENDED RELEASE ORAL DAILY
Qty: 30 TABLET | Refills: 2 | Status: SHIPPED | OUTPATIENT
Start: 2021-12-21 | End: 2022-02-01 | Stop reason: SDUPTHER

## 2021-12-21 RX ORDER — DEXTROAMPHETAMINE SACCHARATE, AMPHETAMINE ASPARTATE, DEXTROAMPHETAMINE SULFATE AND AMPHETAMINE SULFATE 2.5; 2.5; 2.5; 2.5 MG/1; MG/1; MG/1; MG/1
1 TABLET ORAL 2 TIMES DAILY
Qty: 60 TABLET | Refills: 0 | Status: SHIPPED | OUTPATIENT
Start: 2021-12-21 | End: 2022-02-01 | Stop reason: SDUPTHER

## 2021-12-21 RX ORDER — TRAZODONE HYDROCHLORIDE 100 MG/1
250 TABLET ORAL
Qty: 75 TABLET | Refills: 2 | Status: SHIPPED | OUTPATIENT
Start: 2021-12-21 | End: 2022-02-01 | Stop reason: SDUPTHER

## 2021-12-28 ENCOUNTER — IMMUNIZATIONS (OUTPATIENT)
Dept: FAMILY MEDICINE CLINIC | Facility: HOSPITAL | Age: 61
End: 2021-12-28

## 2021-12-28 DIAGNOSIS — Z23 ENCOUNTER FOR IMMUNIZATION: Primary | ICD-10-CM

## 2021-12-28 PROCEDURE — 91300 COVID-19 PFIZER VACC 0.3 ML: CPT

## 2021-12-28 PROCEDURE — 0002A COVID-19 PFIZER VACC 0.3 ML: CPT

## 2022-01-19 ENCOUNTER — TELEMEDICINE (OUTPATIENT)
Dept: ENDOCRINOLOGY | Facility: CLINIC | Age: 62
End: 2022-01-19
Payer: COMMERCIAL

## 2022-01-19 DIAGNOSIS — E03.9 ACQUIRED HYPOTHYROIDISM: Primary | ICD-10-CM

## 2022-01-19 DIAGNOSIS — R63.5 WEIGHT GAIN: ICD-10-CM

## 2022-01-19 DIAGNOSIS — R53.83 OTHER FATIGUE: ICD-10-CM

## 2022-01-19 PROCEDURE — 99214 OFFICE O/P EST MOD 30 MIN: CPT | Performed by: INTERNAL MEDICINE

## 2022-01-19 RX ORDER — LEVOTHYROXINE SODIUM 0.05 MG/1
50 TABLET ORAL DAILY
Qty: 30 TABLET | Refills: 4 | Status: SHIPPED | OUTPATIENT
Start: 2022-01-19 | End: 2022-06-22 | Stop reason: SDUPTHER

## 2022-01-19 RX ORDER — DEXAMETHASONE 1 MG
TABLET ORAL
Qty: 1 TABLET | Refills: 0 | Status: SHIPPED | OUTPATIENT
Start: 2022-01-19

## 2022-01-19 NOTE — PROGRESS NOTES
Virtual Regular Visit    Verification of patient location:    Patient is located in the following state in which I hold an active license [place]      Assessment/Plan:    Problem List Items Addressed This Visit        Endocrine    Acquired hypothyroidism - Primary    Relevant Medications    levothyroxine 50 mcg tablet    dexamethasone (DECADRON) 1 mg tablet    Other Relevant Orders    TSH, 3rd generation Lab Collect       Other    Other fatigue    Relevant Medications    dexamethasone (DECADRON) 1 mg tablet    Other Relevant Orders    Cortisol- Lab Collect    Weight gain    Relevant Orders    Cortisol- Lab Collect               Reason for visit is   Chief Complaint   Patient presents with    Virtual Regular Visit        Encounter provider Chey Chapman MD    Provider located at [address] [place] [address] [id number] [place] [phone]      Recent Visits  No visits were found meeting these conditions  Showing recent visits within past 7 days and meeting all other requirements  Today's Visits  Date Type Provider Dept   01/19/22 Telemedicine Chey Chapman MD [facility] [address] [place] [address] today's visits and meeting all other requirements  Future Appointments  No visits were found meeting these conditions  Showing future appointments within next 150 days and meeting all other requirements       The patient was identified by name and date of birth  Keagan Licha was informed that this is a telemedicine visit and that the visit is being conducted through [facility] and patient was informed this is a secure, HIPAA-complaint platform  She agrees to proceed     My office door was closed  No one else was in the room  She acknowledged consent and understanding of privacy and security of the video platform   The patient has agreed to participate and understands they can discontinue the visit at any time  Patient is aware this is a billable service  Subjective  Deric Mike is a 59 y o  female   Seen in follow-up as a virtual as she tested positive for COVID today  She reports having COVID 3x, currently  1st- suspected in 2020, 2- 2021, and the current one  We were unable to connect with both video and audio  HPI   She was seen for hypothyroidism and concern about weight gain    She has complained about foggy mind, weight regain, and fatigue  She also has constipation, hair loss, coarse hair  She saw weight management and lost 20#, but found the diet difficult to follow and she regained the weight  She did not complete the 1mg dex suppression test  For the past 3 weeks, she has not taken her levothyroxine 50mcg daily  She has some "lump" in the throat        Past Medical History:   Diagnosis Date    Anxiety     Anxiety     Colon polyp     Panic attacks     Thyroid disease        Past Surgical History:   Procedure Laterality Date     SECTION      COLONOSCOPY      HYSTERECTOMY         Current Outpatient Medications   Medication Sig Dispense Refill    amphetamine-dextroamphetamine (ADDERALL) 10 mg tablet Take 1 tablet (10 mg total) by mouth 2 (two) times a day 60 tablet 0    desvenlafaxine (PRISTIQ) 100 mg 24 hr tablet Take 1 tablet (100 mg total) by mouth daily 30 tablet 2    hydrOXYzine HCL (ATARAX) 25 mg tablet Take 1 tablet (25 mg total) by mouth 2 (two) times a day as needed for anxiety 60 tablet 0    linaCLOtide (Linzess) 145 MCG CAPS Take 1 capsule (145 mcg total) by mouth daily 30 capsule 5    Multiple Vitamins-Minerals (multivitamin with minerals) tablet Take 1 tablet by mouth daily      traZODone (DESYREL) 100 mg tablet Take 2 5 tablets (250 mg total) by mouth daily at bedtime 75 tablet 2    dexamethasone (DECADRON) 1 mg tablet Take at 11pm the night before fasting blood work at 8am 1 tablet 0    levothyroxine 50 mcg tablet Take 1 tablet (50 mcg total) by mouth daily 30 tablet 4     No current facility-administered medications for this visit  No Known Allergies    Review of Systems   Constitutional: Positive for fatigue and unexpected weight change  HENT: Positive for trouble swallowing  Negative for hearing loss and voice change  Respiratory: Negative for shortness of breath  Cardiovascular: Negative for palpitations  Gastrointestinal: Positive for constipation  Psychiatric/Behavioral: Positive for decreased concentration and sleep disturbance  Video Exam    There were no vitals filed for this visit  Physical Exam   It was my intent to perform this visit via video technology but the patient was not able to do a video connection so the visit was completed via audio telephone only  DATA  Lab Results   Component Value Date    ZHA9REPYFXMT 8 216 (H) 02/09/2021    TSH 3 760 12/02/2020     Lab Results   Component Value Date     08/04/2014    SODIUM 140 04/19/2021    K 4 1 04/19/2021     04/19/2021    CO2 26 04/19/2021    ANIONGAP 3 (L) 08/04/2014    AGAP 10 04/19/2021    BUN 17 04/19/2021    CREATININE 0 85 04/19/2021    GLUC 97 01/29/2021    GLUF 83 04/19/2021    CALCIUM 8 8 04/19/2021    AST 18 04/19/2021    ALT 25 04/19/2021    ALKPHOS 59 04/19/2021    PROT 7 8 08/04/2014    TP 7 9 04/19/2021    BILITOT 0 2 08/04/2014    TBILI 0 39 04/19/2021    EGFR 74 04/19/2021       A/p  1  Hypothyroidism: resume levothyroxine mcg daily  Check TSH in 6 weeks    2  Weight gain: Likely multifactorial  She had success with the diet from Weight Management but found it diffclt to maintain  I will reorder a 1mg overnight dexamethasone suppression test to verify her cortisol status, but I have a low suspicion for this  3  Possible sleep apnea: Recommend sleep med eval as she has long standing sleep disruptions  4  Fatigue: Additional cause of symptoms may be long COVID, as she expects this is the 3rd time she has had the infection  I spent 25 minutes with patient today in which greater than 50% of the time was spent in counseling/coordination of care regarding hypothyroidism and weight gain    VIRTUAL VISIT DISCLAIMER      Ruel Person verbally agrees to participate in [employer]  Pt is aware that [employer] could be limited without vital signs or the ability to perform a full hands-on physical Pink Sue understands she or the provider may request at any time to terminate the video visit and request the patient to seek care or treatment in person

## 2022-01-19 NOTE — PATIENT INSTRUCTIONS
Please get your labs in 6 weeks for TSH  You take the dexamethsone 1mg at 11pm and then get your blood work done fasting at 8am for cortisol

## 2022-02-01 DIAGNOSIS — F90.2 ADHD (ATTENTION DEFICIT HYPERACTIVITY DISORDER), COMBINED TYPE: ICD-10-CM

## 2022-02-01 DIAGNOSIS — G47.00 INSOMNIA: ICD-10-CM

## 2022-02-01 DIAGNOSIS — F41.0 GENERALIZED ANXIETY DISORDER WITH PANIC ATTACKS: ICD-10-CM

## 2022-02-01 DIAGNOSIS — F33.1 MAJOR DEPRESSIVE DISORDER, RECURRENT EPISODE, MODERATE (HCC): ICD-10-CM

## 2022-02-01 DIAGNOSIS — F41.1 GENERALIZED ANXIETY DISORDER WITH PANIC ATTACKS: ICD-10-CM

## 2022-02-01 RX ORDER — HYDROXYZINE HYDROCHLORIDE 25 MG/1
25 TABLET, FILM COATED ORAL 2 TIMES DAILY PRN
Qty: 60 TABLET | Refills: 0 | Status: SHIPPED | OUTPATIENT
Start: 2022-02-01 | End: 2022-03-07 | Stop reason: SDUPTHER

## 2022-02-01 RX ORDER — TRAZODONE HYDROCHLORIDE 100 MG/1
250 TABLET ORAL
Qty: 75 TABLET | Refills: 2 | Status: SHIPPED | OUTPATIENT
Start: 2022-02-01 | End: 2022-04-11 | Stop reason: SDUPTHER

## 2022-02-01 RX ORDER — DESVENLAFAXINE 100 MG/1
100 TABLET, EXTENDED RELEASE ORAL DAILY
Qty: 30 TABLET | Refills: 2 | Status: SHIPPED | OUTPATIENT
Start: 2022-02-01 | End: 2022-03-07 | Stop reason: SDUPTHER

## 2022-02-01 RX ORDER — DEXTROAMPHETAMINE SACCHARATE, AMPHETAMINE ASPARTATE, DEXTROAMPHETAMINE SULFATE AND AMPHETAMINE SULFATE 2.5; 2.5; 2.5; 2.5 MG/1; MG/1; MG/1; MG/1
1 TABLET ORAL 2 TIMES DAILY
Qty: 60 TABLET | Refills: 0 | Status: SHIPPED | OUTPATIENT
Start: 2022-02-01 | End: 2022-03-07 | Stop reason: SDUPTHER

## 2022-02-01 NOTE — TELEPHONE ENCOUNTER
PDMP website reviewed  Janice Garay has been appropriately adherent to controlled psychotropic medications without evidence of abuse or misuse  As such, will send 30-day refill to pharmacy of choice and follow up as necessary

## 2022-02-04 ENCOUNTER — TELEPHONE (OUTPATIENT)
Dept: INTERNAL MEDICINE CLINIC | Facility: CLINIC | Age: 62
End: 2022-02-04

## 2022-02-04 NOTE — TELEPHONE ENCOUNTER
I left a voicemail and [employer] message for pt  to call back and let us know if she has had a mammo done within the past year and also to schedule a follow up appt  With Dr Shalom Arenas

## 2022-02-15 ENCOUNTER — TELEPHONE (OUTPATIENT)
Dept: PSYCHIATRY | Facility: CLINIC | Age: 62
End: 2022-02-15

## 2022-02-22 ENCOUNTER — TELEPHONE (OUTPATIENT)
Dept: GASTROENTEROLOGY | Facility: CLINIC | Age: 62
End: 2022-02-22

## 2022-02-22 DIAGNOSIS — K59.04 CHRONIC IDIOPATHIC CONSTIPATION: ICD-10-CM

## 2022-02-22 RX ORDER — LINACLOTIDE 145 UG/1
145 CAPSULE, GELATIN COATED ORAL DAILY
Qty: 30 CAPSULE | Refills: 5 | Status: SHIPPED | OUTPATIENT
Start: 2022-02-22 | End: 2022-08-21

## 2022-02-22 NOTE — TELEPHONE ENCOUNTER
Patients GI provider:  Dr Castro Ann    Number to return call: [phone]    Reason for call: Pt calling stating she has been experiencing constipation and rectal bleeding  Scheduled procedure/appointment date if applicable: Appt   3/8/22

## 2022-02-22 NOTE — TELEPHONE ENCOUNTER
OV 3/17/21 Stephania Langston  Hx: Chronic Constipation  Colonoscopy: 1/2021    Please Advise  Pt stated since last OV has been unable to obtain linzess due to medication cost of $400/insurance not approving medication  I do not see any documentation prior auth was done for linzess? Pt stated she has been "winging it" for treating chronic constipation however concerned after having BRBPR with wiping and small amount of blood on outside of stool today  Last time pt had BRBPR she had "rectal ulcers"  BM is usually every 5 days but occasionally longer, abdominal bloating, small hard stool, does not feel completely emptied  Recs:  1  Provider please order linzess  2  Since pt has failed multiple PO OTC medications advised to trial suppository or enema to tx constipation  3  Are there any samples available in office?

## 2022-02-22 NOTE — TELEPHONE ENCOUNTER
Called [facility] and informed linzess needs a prior auth, clinical team please start prior auth     Called pt and reviewed provider recs  Offered [facility] to come to [facility] office for 145mcg linzess sample, pt agreeable to picking up samples at [facility]  Reviewed medication admin instructions per provider instructions  Pt verbalized understanding and had no further questions at this time

## 2022-02-23 ENCOUNTER — TELEPHONE (OUTPATIENT)
Dept: GASTROENTEROLOGY | Facility: AMBULARY SURGERY CENTER | Age: 62
End: 2022-02-23

## 2022-03-07 DIAGNOSIS — F41.1 GENERALIZED ANXIETY DISORDER WITH PANIC ATTACKS: ICD-10-CM

## 2022-03-07 DIAGNOSIS — F90.2 ADHD (ATTENTION DEFICIT HYPERACTIVITY DISORDER), COMBINED TYPE: ICD-10-CM

## 2022-03-07 DIAGNOSIS — F41.0 GENERALIZED ANXIETY DISORDER WITH PANIC ATTACKS: ICD-10-CM

## 2022-03-07 DIAGNOSIS — F33.1 MAJOR DEPRESSIVE DISORDER, RECURRENT EPISODE, MODERATE (HCC): ICD-10-CM

## 2022-03-07 RX ORDER — DESVENLAFAXINE 100 MG/1
100 TABLET, EXTENDED RELEASE ORAL DAILY
Qty: 30 TABLET | Refills: 2 | Status: SHIPPED | OUTPATIENT
Start: 2022-03-07 | End: 2022-04-11 | Stop reason: SDUPTHER

## 2022-03-07 RX ORDER — DEXTROAMPHETAMINE SACCHARATE, AMPHETAMINE ASPARTATE, DEXTROAMPHETAMINE SULFATE AND AMPHETAMINE SULFATE 2.5; 2.5; 2.5; 2.5 MG/1; MG/1; MG/1; MG/1
1 TABLET ORAL 2 TIMES DAILY
Qty: 60 TABLET | Refills: 0 | Status: SHIPPED | OUTPATIENT
Start: 2022-03-07 | End: 2022-04-11 | Stop reason: SDUPTHER

## 2022-03-07 RX ORDER — HYDROXYZINE HYDROCHLORIDE 25 MG/1
25 TABLET, FILM COATED ORAL 2 TIMES DAILY PRN
Qty: 60 TABLET | Refills: 0 | Status: SHIPPED | OUTPATIENT
Start: 2022-03-07 | End: 2022-05-17 | Stop reason: SDUPTHER

## 2022-03-07 NOTE — TELEPHONE ENCOUNTER
PDMP website reviewed  Je Grant has been appropriately adherent to controlled psychotropic medications without evidence of abuse or misuse  As such, will send 30-day refill to pharmacy of choice and follow up as necessary

## 2022-03-24 ENCOUNTER — VBI (OUTPATIENT)
Dept: ADMINISTRATIVE | Facility: OTHER | Age: 62
End: 2022-03-24

## 2022-04-11 DIAGNOSIS — F90.2 ADHD (ATTENTION DEFICIT HYPERACTIVITY DISORDER), COMBINED TYPE: ICD-10-CM

## 2022-04-11 DIAGNOSIS — F33.1 MAJOR DEPRESSIVE DISORDER, RECURRENT EPISODE, MODERATE (HCC): ICD-10-CM

## 2022-04-11 DIAGNOSIS — R53.83 OTHER FATIGUE: ICD-10-CM

## 2022-04-11 DIAGNOSIS — G47.00 INSOMNIA: ICD-10-CM

## 2022-04-11 RX ORDER — DEXAMETHASONE 1 MG
TABLET ORAL
Qty: 1 TABLET | Refills: 0 | OUTPATIENT
Start: 2022-04-11

## 2022-04-11 RX ORDER — DESVENLAFAXINE 100 MG/1
100 TABLET, EXTENDED RELEASE ORAL DAILY
Qty: 30 TABLET | Refills: 2 | Status: SHIPPED | OUTPATIENT
Start: 2022-04-11 | End: 2022-07-27 | Stop reason: SDUPTHER

## 2022-04-11 RX ORDER — TRAZODONE HYDROCHLORIDE 100 MG/1
250 TABLET ORAL
Qty: 75 TABLET | Refills: 2 | Status: SHIPPED | OUTPATIENT
Start: 2022-04-11 | End: 2022-07-27 | Stop reason: SDUPTHER

## 2022-04-11 RX ORDER — DEXTROAMPHETAMINE SACCHARATE, AMPHETAMINE ASPARTATE, DEXTROAMPHETAMINE SULFATE AND AMPHETAMINE SULFATE 2.5; 2.5; 2.5; 2.5 MG/1; MG/1; MG/1; MG/1
1 TABLET ORAL 2 TIMES DAILY
Qty: 60 TABLET | Refills: 0 | Status: SHIPPED | OUTPATIENT
Start: 2022-04-11 | End: 2022-05-17 | Stop reason: SDUPTHER

## 2022-05-17 DIAGNOSIS — F41.0 GENERALIZED ANXIETY DISORDER WITH PANIC ATTACKS: ICD-10-CM

## 2022-05-17 DIAGNOSIS — F41.1 GENERALIZED ANXIETY DISORDER WITH PANIC ATTACKS: ICD-10-CM

## 2022-05-17 DIAGNOSIS — F90.2 ADHD (ATTENTION DEFICIT HYPERACTIVITY DISORDER), COMBINED TYPE: ICD-10-CM

## 2022-05-17 RX ORDER — DEXTROAMPHETAMINE SACCHARATE, AMPHETAMINE ASPARTATE, DEXTROAMPHETAMINE SULFATE AND AMPHETAMINE SULFATE 2.5; 2.5; 2.5; 2.5 MG/1; MG/1; MG/1; MG/1
1 TABLET ORAL 2 TIMES DAILY
Qty: 60 TABLET | Refills: 0 | Status: SHIPPED | OUTPATIENT
Start: 2022-05-17 | End: 2022-06-23 | Stop reason: SDUPTHER

## 2022-05-17 RX ORDER — HYDROXYZINE HYDROCHLORIDE 25 MG/1
25 TABLET, FILM COATED ORAL 2 TIMES DAILY PRN
Qty: 60 TABLET | Refills: 0 | Status: SHIPPED | OUTPATIENT
Start: 2022-05-17 | End: 2022-06-23 | Stop reason: SDUPTHER

## 2022-05-17 NOTE — TELEPHONE ENCOUNTER
PDMP website reviewed  Charla Callaway has been appropriately adherent to controlled psychotropic medications without evidence of abuse or misuse  As such, will send 30-day refill to pharmacy of choice and follow up as necessary

## 2022-06-15 ENCOUNTER — VBI (OUTPATIENT)
Dept: ADMINISTRATIVE | Facility: OTHER | Age: 62
End: 2022-06-15

## 2022-06-20 NOTE — TELEPHONE ENCOUNTER
Patient called the office and lvm requesting a call back to refill medications  Writer called patient and lvm requesting them to call back to confirm information on refills and pharmacy

## 2022-06-21 ENCOUNTER — OFFICE VISIT (OUTPATIENT)
Dept: INTERNAL MEDICINE CLINIC | Facility: CLINIC | Age: 62
End: 2022-06-21
Payer: COMMERCIAL

## 2022-06-21 ENCOUNTER — APPOINTMENT (OUTPATIENT)
Dept: LAB | Facility: CLINIC | Age: 62
End: 2022-06-21
Payer: COMMERCIAL

## 2022-06-21 VITALS
BODY MASS INDEX: 31.47 KG/M2 | HEIGHT: 63 IN | HEART RATE: 62 BPM | DIASTOLIC BLOOD PRESSURE: 74 MMHG | RESPIRATION RATE: 16 BRPM | OXYGEN SATURATION: 96 % | TEMPERATURE: 97.5 F | WEIGHT: 177.6 LBS | SYSTOLIC BLOOD PRESSURE: 120 MMHG

## 2022-06-21 DIAGNOSIS — R53.82 POST-COVID CHRONIC FATIGUE: ICD-10-CM

## 2022-06-21 DIAGNOSIS — U09.9 POST-COVID CHRONIC FATIGUE: ICD-10-CM

## 2022-06-21 DIAGNOSIS — K59.04 CHRONIC IDIOPATHIC CONSTIPATION: ICD-10-CM

## 2022-06-21 DIAGNOSIS — R53.82 POST-COVID CHRONIC FATIGUE: Primary | ICD-10-CM

## 2022-06-21 DIAGNOSIS — E03.9 ACQUIRED HYPOTHYROIDISM: ICD-10-CM

## 2022-06-21 DIAGNOSIS — U09.9 POST-COVID CHRONIC FATIGUE: Primary | ICD-10-CM

## 2022-06-21 PROBLEM — G93.32 POST-COVID CHRONIC FATIGUE: Status: ACTIVE | Noted: 2022-06-21

## 2022-06-21 LAB
25(OH)D3 SERPL-MCNC: 36.6 NG/ML (ref 30–100)
ALBUMIN SERPL BCP-MCNC: 4.3 G/DL (ref 3.5–5)
ALP SERPL-CCNC: 56 U/L (ref 34–104)
ALT SERPL W P-5'-P-CCNC: 12 U/L (ref 7–52)
ANION GAP SERPL CALCULATED.3IONS-SCNC: 6 MMOL/L (ref 4–13)
AST SERPL W P-5'-P-CCNC: 15 U/L (ref 13–39)
BASOPHILS # BLD AUTO: 0.03 THOUSANDS/ΜL (ref 0–0.1)
BASOPHILS NFR BLD AUTO: 0 % (ref 0–1)
BILIRUB SERPL-MCNC: 0.4 MG/DL (ref 0.2–1)
BUN SERPL-MCNC: 18 MG/DL (ref 5–25)
CALCIUM SERPL-MCNC: 9.3 MG/DL (ref 8.4–10.2)
CHLORIDE SERPL-SCNC: 106 MMOL/L (ref 96–108)
CO2 SERPL-SCNC: 27 MMOL/L (ref 21–32)
CREAT SERPL-MCNC: 0.75 MG/DL (ref 0.6–1.3)
EOSINOPHIL # BLD AUTO: 0.18 THOUSAND/ΜL (ref 0–0.61)
EOSINOPHIL NFR BLD AUTO: 2 % (ref 0–6)
ERYTHROCYTE [DISTWIDTH] IN BLOOD BY AUTOMATED COUNT: 15.7 % (ref 11.6–15.1)
GFR SERPL CREATININE-BSD FRML MDRD: 85 ML/MIN/1.73SQ M
GLUCOSE P FAST SERPL-MCNC: 84 MG/DL (ref 65–99)
HCT VFR BLD AUTO: 41.8 % (ref 34.8–46.1)
HGB BLD-MCNC: 13.9 G/DL (ref 11.5–15.4)
IMM GRANULOCYTES # BLD AUTO: 0.02 THOUSAND/UL (ref 0–0.2)
IMM GRANULOCYTES NFR BLD AUTO: 0 % (ref 0–2)
LYMPHOCYTES # BLD AUTO: 2.9 THOUSANDS/ΜL (ref 0.6–4.47)
LYMPHOCYTES NFR BLD AUTO: 38 % (ref 14–44)
MCH RBC QN AUTO: 28 PG (ref 26.8–34.3)
MCHC RBC AUTO-ENTMCNC: 33.3 G/DL (ref 31.4–37.4)
MCV RBC AUTO: 84 FL (ref 82–98)
MONOCYTES # BLD AUTO: 0.49 THOUSAND/ΜL (ref 0.17–1.22)
MONOCYTES NFR BLD AUTO: 6 % (ref 4–12)
NEUTROPHILS # BLD AUTO: 4.07 THOUSANDS/ΜL (ref 1.85–7.62)
NEUTS SEG NFR BLD AUTO: 54 % (ref 43–75)
NRBC BLD AUTO-RTO: 0 /100 WBCS
PLATELET # BLD AUTO: 273 THOUSANDS/UL (ref 149–390)
PMV BLD AUTO: 9.4 FL (ref 8.9–12.7)
POTASSIUM SERPL-SCNC: 4.1 MMOL/L (ref 3.5–5.3)
PROT SERPL-MCNC: 7.3 G/DL (ref 6.4–8.4)
RBC # BLD AUTO: 4.97 MILLION/UL (ref 3.81–5.12)
SODIUM SERPL-SCNC: 139 MMOL/L (ref 135–147)
T4 FREE SERPL-MCNC: 1.17 NG/DL (ref 0.76–1.46)
TSH SERPL DL<=0.05 MIU/L-ACNC: 5.92 UIU/ML (ref 0.45–4.5)
WBC # BLD AUTO: 7.69 THOUSAND/UL (ref 4.31–10.16)

## 2022-06-21 PROCEDURE — 36415 COLL VENOUS BLD VENIPUNCTURE: CPT

## 2022-06-21 PROCEDURE — 84443 ASSAY THYROID STIM HORMONE: CPT

## 2022-06-21 PROCEDURE — 85025 COMPLETE CBC W/AUTO DIFF WBC: CPT

## 2022-06-21 PROCEDURE — 84439 ASSAY OF FREE THYROXINE: CPT

## 2022-06-21 PROCEDURE — 99214 OFFICE O/P EST MOD 30 MIN: CPT | Performed by: NURSE PRACTITIONER

## 2022-06-21 PROCEDURE — 80053 COMPREHEN METABOLIC PANEL: CPT

## 2022-06-21 PROCEDURE — 82306 VITAMIN D 25 HYDROXY: CPT

## 2022-06-21 RX ORDER — LINACLOTIDE 290 UG/1
290 CAPSULE, GELATIN COATED ORAL DAILY PRN
COMMUNITY

## 2022-06-21 NOTE — PROGRESS NOTES
Assessment/Plan:    Post-COVID chronic fatigue  Check labs  Will refer to the post covid recovery program for PT/OT/speech  Chronic idiopathic constipation  Recommend taking linzess daily  Follow a high fiber diet  Schedule with GI if not improving on current medication  Acquired hypothyroidism  Overdue for labs  Sees endo  On levothyroxine  Diagnoses and all orders for this visit:    Post-COVID chronic fatigue  -     Comprehensive metabolic panel; Future  -     CBC and differential; Future  -     TSH, 3rd generation with Free T4 reflex; Future  -     Vitamin D 25 hydroxy; Future  -     Ambulatory referral to Physical Therapy; Future  -     Ambulatory referral to Occupational Therapy; Future  -     Ambulatory referral to Speech Therapy; Future    Acquired hypothyroidism    Chronic idiopathic constipation    Other orders  -     linaCLOtide (Linzess) 290 MCG CAPS; Take 290 mcg by mouth daily as needed          Subjective:      Patient ID: Mariah Hernandez is a 59 y o  female  Laverda San is here today with post covid symptoms  She has had covid 3 times  Her last positive test was in February  Since then she has not felt herself  She feels very fatigued  She has upper and lower extremity weakness  She has lightheadedness and brain fog  She is eating healthy but not exercising  She has been able to work without issues  She denies any cough or shortness of breath  She reports continued constipation despite taking linzess  She does admit she just restarted it a few days and and does not take it daily  She has seen GI in the past              The following portions of the patient's history were reviewed and updated as appropriate: allergies, current medications, past family history, past medical history, past social history, past surgical history and problem list     Review of Systems   Constitutional: Positive for activity change and fatigue   Negative for appetite change and fever    Respiratory: Negative for cough and shortness of breath  Cardiovascular: Negative for chest pain, palpitations and leg swelling  Gastrointestinal: Positive for constipation  Negative for abdominal pain, diarrhea, nausea and vomiting  Genitourinary: Negative for difficulty urinating  Neurological: Positive for weakness  Negative for dizziness, light-headedness and headaches  Objective:      /74   Pulse 62   Temp 97 5 °F (36 4 °C) (Temporal)   Resp 16   Ht 5' 3" (1 6 m)   Wt 80 6 kg (177 lb 9 6 oz)   SpO2 96%   BMI 31 46 kg/m²          Physical Exam  Vitals reviewed  Constitutional:       Appearance: Normal appearance  HENT:      Head: Normocephalic and atraumatic  Right Ear: Tympanic membrane, ear canal and external ear normal       Left Ear: Tympanic membrane, ear canal and external ear normal    Eyes:      Conjunctiva/sclera: Conjunctivae normal       Pupils: Pupils are equal, round, and reactive to light  Cardiovascular:      Rate and Rhythm: Normal rate and regular rhythm  Heart sounds: Normal heart sounds  Pulmonary:      Effort: Pulmonary effort is normal       Breath sounds: Normal breath sounds  Abdominal:      General: Bowel sounds are normal       Palpations: Abdomen is soft  Musculoskeletal:         General: Normal range of motion  Skin:     General: Skin is warm and dry  Neurological:      General: No focal deficit present  Mental Status: She is alert and oriented to person, place, and time     Psychiatric:         Mood and Affect: Mood normal          Behavior: Behavior normal

## 2022-06-21 NOTE — ASSESSMENT & PLAN NOTE
Recommend taking linzess daily  Follow a high fiber diet  Schedule with GI if not improving on current medication

## 2022-06-22 DIAGNOSIS — E03.9 ACQUIRED HYPOTHYROIDISM: ICD-10-CM

## 2022-06-22 RX ORDER — LEVOTHYROXINE SODIUM 0.07 MG/1
75 TABLET ORAL DAILY
Qty: 90 TABLET | Refills: 0 | Status: SHIPPED | OUTPATIENT
Start: 2022-06-22 | End: 2022-10-06

## 2022-06-23 DIAGNOSIS — F41.0 GENERALIZED ANXIETY DISORDER WITH PANIC ATTACKS: ICD-10-CM

## 2022-06-23 DIAGNOSIS — G47.00 INSOMNIA: ICD-10-CM

## 2022-06-23 DIAGNOSIS — F33.1 MAJOR DEPRESSIVE DISORDER, RECURRENT EPISODE, MODERATE (HCC): ICD-10-CM

## 2022-06-23 DIAGNOSIS — F41.1 GENERALIZED ANXIETY DISORDER WITH PANIC ATTACKS: ICD-10-CM

## 2022-06-23 DIAGNOSIS — F90.2 ADHD (ATTENTION DEFICIT HYPERACTIVITY DISORDER), COMBINED TYPE: ICD-10-CM

## 2022-06-23 RX ORDER — DESVENLAFAXINE 100 MG/1
100 TABLET, EXTENDED RELEASE ORAL DAILY
Qty: 30 TABLET | Refills: 2 | OUTPATIENT
Start: 2022-06-23

## 2022-06-23 RX ORDER — HYDROXYZINE HYDROCHLORIDE 25 MG/1
25 TABLET, FILM COATED ORAL 2 TIMES DAILY PRN
Qty: 60 TABLET | Refills: 0 | Status: SHIPPED | OUTPATIENT
Start: 2022-06-23 | End: 2022-07-27 | Stop reason: SDUPTHER

## 2022-06-23 RX ORDER — DEXTROAMPHETAMINE SACCHARATE, AMPHETAMINE ASPARTATE, DEXTROAMPHETAMINE SULFATE AND AMPHETAMINE SULFATE 2.5; 2.5; 2.5; 2.5 MG/1; MG/1; MG/1; MG/1
1 TABLET ORAL 2 TIMES DAILY
Qty: 60 TABLET | Refills: 0 | Status: SHIPPED | OUTPATIENT
Start: 2022-06-23 | End: 2022-07-27 | Stop reason: SDUPTHER

## 2022-06-23 RX ORDER — TRAZODONE HYDROCHLORIDE 100 MG/1
250 TABLET ORAL
Qty: 75 TABLET | Refills: 2 | OUTPATIENT
Start: 2022-06-23

## 2022-06-23 NOTE — TELEPHONE ENCOUNTER
PMDP and Chart reviewed  Refills for Adderal and Atarax were sent to the patient's preferred pharmacy, covering patient's primary psychiatrist, Dr Silvano Daniel

## 2022-06-23 NOTE — TELEPHONE ENCOUNTER
Spoke with pharmacist  Desvenlafaxine and Trazodone are unable to be filled until 7/3/22      Please review for refills of Adderall 10 mg and Atarax in Dr Bina sanchez   Next appointment 8/29/22

## 2022-07-13 ENCOUNTER — TELEPHONE (OUTPATIENT)
Dept: SPEECH THERAPY | Facility: CLINIC | Age: 62
End: 2022-07-13

## 2022-07-27 DIAGNOSIS — G47.00 INSOMNIA: ICD-10-CM

## 2022-07-27 DIAGNOSIS — F41.0 GENERALIZED ANXIETY DISORDER WITH PANIC ATTACKS: ICD-10-CM

## 2022-07-27 DIAGNOSIS — F90.2 ADHD (ATTENTION DEFICIT HYPERACTIVITY DISORDER), COMBINED TYPE: ICD-10-CM

## 2022-07-27 DIAGNOSIS — F33.1 MAJOR DEPRESSIVE DISORDER, RECURRENT EPISODE, MODERATE (HCC): ICD-10-CM

## 2022-07-27 DIAGNOSIS — F41.1 GENERALIZED ANXIETY DISORDER WITH PANIC ATTACKS: ICD-10-CM

## 2022-07-28 RX ORDER — DEXTROAMPHETAMINE SACCHARATE, AMPHETAMINE ASPARTATE, DEXTROAMPHETAMINE SULFATE AND AMPHETAMINE SULFATE 2.5; 2.5; 2.5; 2.5 MG/1; MG/1; MG/1; MG/1
1 TABLET ORAL 2 TIMES DAILY
Qty: 60 TABLET | Refills: 0 | Status: SHIPPED | OUTPATIENT
Start: 2022-07-28 | End: 2022-08-29 | Stop reason: SDUPTHER

## 2022-07-28 RX ORDER — DESVENLAFAXINE 100 MG/1
100 TABLET, EXTENDED RELEASE ORAL DAILY
Qty: 30 TABLET | Refills: 2 | Status: SHIPPED | OUTPATIENT
Start: 2022-07-28 | End: 2022-08-29 | Stop reason: SDUPTHER

## 2022-07-28 RX ORDER — TRAZODONE HYDROCHLORIDE 100 MG/1
100 TABLET ORAL
Qty: 30 TABLET | Refills: 2 | Status: SHIPPED | OUTPATIENT
Start: 2022-07-28 | End: 2022-08-29 | Stop reason: SDUPTHER

## 2022-07-28 RX ORDER — HYDROXYZINE HYDROCHLORIDE 25 MG/1
25 TABLET, FILM COATED ORAL 2 TIMES DAILY PRN
Qty: 60 TABLET | Refills: 0 | Status: SHIPPED | OUTPATIENT
Start: 2022-07-28 | End: 2022-08-29 | Stop reason: SDUPTHER

## 2022-07-28 NOTE — TELEPHONE ENCOUNTER
PDMP website reviewed  Samm Machado has been appropriately adherent to controlled psychotropic medications without evidence of abuse or misuse  As such, will send 30-day refill to pharmacy of choice and follow up as necessary

## 2022-08-29 ENCOUNTER — TELEPHONE (OUTPATIENT)
Dept: PSYCHIATRY | Facility: CLINIC | Age: 62
End: 2022-08-29

## 2022-08-29 ENCOUNTER — OFFICE VISIT (OUTPATIENT)
Dept: PSYCHIATRY | Facility: CLINIC | Age: 62
End: 2022-08-29
Payer: COMMERCIAL

## 2022-08-29 DIAGNOSIS — F33.1 MAJOR DEPRESSIVE DISORDER, RECURRENT EPISODE, MODERATE (HCC): Primary | ICD-10-CM

## 2022-08-29 DIAGNOSIS — F90.2 ADHD (ATTENTION DEFICIT HYPERACTIVITY DISORDER), COMBINED TYPE: ICD-10-CM

## 2022-08-29 DIAGNOSIS — F51.04 PSYCHOPHYSIOLOGICAL INSOMNIA: ICD-10-CM

## 2022-08-29 DIAGNOSIS — F41.0 GENERALIZED ANXIETY DISORDER WITH PANIC ATTACKS: ICD-10-CM

## 2022-08-29 DIAGNOSIS — G47.00 INSOMNIA: ICD-10-CM

## 2022-08-29 DIAGNOSIS — F41.1 GENERALIZED ANXIETY DISORDER WITH PANIC ATTACKS: ICD-10-CM

## 2022-08-29 PROCEDURE — 99214 OFFICE O/P EST MOD 30 MIN: CPT | Performed by: STUDENT IN AN ORGANIZED HEALTH CARE EDUCATION/TRAINING PROGRAM

## 2022-08-29 RX ORDER — DESVENLAFAXINE 100 MG/1
100 TABLET, EXTENDED RELEASE ORAL DAILY
Qty: 30 TABLET | Refills: 4 | Status: SHIPPED | OUTPATIENT
Start: 2022-08-29 | End: 2022-09-19 | Stop reason: SDUPTHER

## 2022-08-29 RX ORDER — TRAZODONE HYDROCHLORIDE 100 MG/1
100 TABLET ORAL
Qty: 30 TABLET | Refills: 3 | Status: SHIPPED | OUTPATIENT
Start: 2022-08-29 | End: 2022-09-19 | Stop reason: SDUPTHER

## 2022-08-29 RX ORDER — DEXTROAMPHETAMINE SACCHARATE, AMPHETAMINE ASPARTATE, DEXTROAMPHETAMINE SULFATE AND AMPHETAMINE SULFATE 2.5; 2.5; 2.5; 2.5 MG/1; MG/1; MG/1; MG/1
1 TABLET ORAL 2 TIMES DAILY
Qty: 60 TABLET | Refills: 0 | Status: SHIPPED | OUTPATIENT
Start: 2022-08-29 | End: 2022-09-19 | Stop reason: SDUPTHER

## 2022-08-29 RX ORDER — HYDROXYZINE HYDROCHLORIDE 25 MG/1
TABLET, FILM COATED ORAL
Qty: 90 TABLET | Refills: 3 | Status: SHIPPED | OUTPATIENT
Start: 2022-08-29 | End: 2022-09-19 | Stop reason: SDUPTHER

## 2022-08-29 NOTE — BH TREATMENT PLAN
TREATMENT PLAN (Medication Management Only)        [facility]    Name and Date of Birth:  Gabriella Reardon 57 y o  1960  Date of Treatment Plan: August 29, 2022  Diagnosis/Diagnoses:    1  Major depressive disorder, recurrent episode, moderate ([address] )    2  Generalized anxiety disorder with panic attacks    3  ADHD (attention deficit hyperactivity disorder), combined type    4  Psychophysiological insomnia      Strengths/Personal Resources for Self-Care: supportive family, supportive friends, taking medications as prescribed, ability to adapt to life changes, ability to communicate needs, ability to communicate well, ability to listen, good understanding of illness, motivation for treatment, ability to negotiate basic needs, being resoureceful, self-reliance, sense of humor, stable employment, willingness to work on problems  Area/Areas of need (in own words): anxiety symptoms, depressive symptoms, sleep problems  1  Long Term Goal: improve control ofsleep  Target Date:6 months - 2/28/2023  Person/Persons responsible for completion of goal: Chitra Rich  2  Short Term Objective (s) - How will we reach this goal?:   A  Provider new recommended medication/dosage changes and/or continue medication(s): continue current medications as prescribed  B  Attend medication management appointments regularly  C  Take psychiatric medications responsibly  D  Start individual therapy once again  E  Continue to support my son in rising my grandchild - seek custody in October  F   Improve socialization - reduce isolation   Target Date:6 months - 2/28/2023  Person/Persons Responsible for Completion of Goal: Chitra Rich  Progress Towards Goals: stable, continuing treatment  Treatment Modality: medication management every 3 months  Review due 180 days from date of this plan: 6 months - 2/28/2023  Expected length of service: ongoing treatment  My Physician/PA/NP and I have developed this plan together and I agree to work on the goals and objectives  I understand the treatment goals that were developed for my treatment  Treatment Plan completed with assistance and input from patient and verbal consent provided  Treatment plan was not signed at time of office visit secondary to COVID-19 social distancing guidelines

## 2022-08-29 NOTE — PSYCH
MEDICATION MANAGEMENT NOTE        [address]      Name and Date of Birth:  Iraida Tello 57 y o  1960 MRN: [id number]    Date of Visit: August 29, 2022    Reason for Visit: Follow-up visit for medication management     SUBJECTIVE:    Iraida Tello is a 57 y o  female with past psychiatric history significant for major depressive disorder, generalized anxiety with panic attacks, ADHD (inattentive type), and insomnia who was personally seen and evaluated today at the [address] [place] [address] outpatient clinic for follow-up and medication management  Samm Machado presents as calm, pleasant, and cooperative  Her thoughts are organized and linear  She completes assessment without difficulty  Samm Machado endorses compliance with psychotropic medication regimen consisting of Pristiq, Trazodone, Adderall, and PRN Hydroxyzine  She denies adverse medication side effects  Samm Machado has not been evaluated since November 2021  She speaks at length today regarding psychosocial stressors and supportive therapy was provided  Samm Machado continues to find purpose and meaning in her work at the [employer]  She speaks to ways routine and structure has improved her life  She often feels a sense of guilt and dissatisfaction when not working  Samm Machado continues to use Adderall appropriately  PDMP website reviewed, no acute concerns for abuse or misuse  Samm Machado reports adequate focus, concentration, and attentiveness  She denies issues with productivity  Samm Machado does highlight ongoing struggles with "fatigue" and anergia  She has been isolating more as a result  Her sleep is fragmented at times and appears to be worse since moving Pristiq to the evening  As such, Samm Machado was agreeable to start taking Pristiq each AM  Acutely, Samm Machado denies SI/HI  She has no plans to harm herself or others   She is future-oriented, discussing long-term goals of acquiring custody of her grandson (with his father) at a court date in October  She shares recent stories regarding her grandson's mother's behavior and supportive therapy/joint problem solving utilized  Aspen Guerra denies crying spells, anhedonia, or apathy  She continues to endorse worry and nervousness regarding this court case and her father's health (ie dementia)  She denies daily tension or feelings of being on-edge but does endorse panic symptoms that are consuming "about 4 times" in the last 10 months  She is utilizing PRN Hydroxyzine during peak anxiety episodes with good benefit  She was encouraged to optimize Hydroxyzine from 25mg to 50mg ONLY during episodes that she cannot manage after 30 minutes  She was agreeable  During today's examination, Aspen Guerra does not exhibit objective evidence of tiffany/hypomania or hayde psychosis  Aspen Guerra is not currently irritable, grandiose, labile, or pathologically euphoric  Aspen Guerra is without perceptual disturbances, such as A/V hallucinations, paranoia, ideas of reference, or delusional beliefs  Aspen Guerra denies recent ETOH or illicit substance abuse  She offers no further concerns  Current Rating Scores:     None completed today      Review Of Systems:      Constitutional feeling tired, low energy and as noted in HPI   ENT negative   Cardiovascular negative   Respiratory negative   Gastrointestinal negative   Genitourinary negative   Musculoskeletal negative   Integumentary negative   Neurological negative   Endocrine negative   Other Symptoms none, all other systems are negative       Past Psychiatric History: (unchanged information from previous note copied and italicized) - Information that is bolded has been updated       Inpatient psychiatric admissions: Denies  Prior outpatient psychiatric linkage: Previously linked with Dr Dwight Campos via [facility]  Past/current psychotherapy: Denies  History of suicidal attempts/gestures: Denies  History of violence/aggressive behaviors: Denies  Psychotropic medication trials: Seroquel (sleep - too sedating), Zoloft (minimal benefit, gave her nightmares), Adderall, Xanax, Trazodone, Pristiq, Hydroxyzine (now)  Substance abuse inpatient/outpatient rehabilitation: Denies     Substance Abuse History: (unchanged information from previous note copied and italicized) - Information that is bolded has been updated       Denies a history of ETOH or illicit substance abuse  No prior DWIs or DUIs  No prior involvement with law enforcement secondary to substance use      Social History: (unchanged information from previous note copied and italicized) - Information that is bolded has been updated       Developmental: Denies a history of milestone/developmental delay  Denies a history of in-utero exposure to toxins/illicit substances   There is no documented history of IEP or need for special education  Education: high school diploma/GED  Marital history:   Living arrangement, social support:  and extended family, has 2 children (son 28 y/o, daughter 45 y/o), caring for grandsonTimbo now  Occupational History: 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 and now working at a [employer] in [employer] 4x per week  Access to firearms: Denies direct access to weapons/firearms  Jeri Acosta has no history of arrests or violence with a deadly weapon       Traumatic History: (unchanged information from previous note copied and italicized) - Information that is bolded has been updated        Abuse:none is reported  Other Traumatic Events:  being incarcerated was difficult         Past Medical History:    Past Medical History:   Diagnosis Date    Anxiety     Anxiety     Colon polyp     Panic attacks     Thyroid disease     Visual impairment         Past Surgical History:   Procedure Laterality Date     SECTION      COLONOSCOPY      HYSTERECTOMY      TUBAL LIGATION  82     No Known Allergies    Substance Abuse History:    Social History     Substance and Sexual Activity   Alcohol Use Yes    Comment: Rare- 1 every few weeks      Social History     Substance and Sexual Activity   Drug Use No    Types: Cocaine    Comment: coccaine use 1978 as per allscripts       Social History:    Social History     Socioeconomic History    Marital status: /Civil Union     Spouse name: Not on file    Number of children: Not on file    Years of education: Not on file    Highest education level: Not on file   Occupational History    Not on file   Tobacco Use    Smoking status: Current Every Day Smoker     Packs/day: 1 00     Years: 40 00     Pack years: 40 00     Types: Cigarettes     Start date: 1/4/1980    Smokeless tobacco: Never Used    Tobacco comment: since age 23   Vaping Use    Vaping Use: Never used   Substance and Sexual Activity    Alcohol use: Yes     Comment: Rare- 1 every few weeks     Drug use: No     Types: Cocaine     Comment: coccaine use 1978 as per allscripts    Sexual activity: Yes     Partners: Male     Birth control/protection: None   Other Topics Concern    Not on file   Social History Narrative        Has a son and a daughter in [place] - 3 grandchildren    Working - at [employer] in a [employer] while  incarcerated almost 2 yrs        Drinks Coffee: 3-4 cups     Social Determinants of Health     Financial Resource Strain: Not on file   Food Insecurity: Not on file   Transportation Needs: Not on file   Physical Activity: Not on file   Stress: Not on file   Social Connections: Not on file   Intimate Partner Violence: Not on file   Housing Stability: Not on file       Family Psychiatric History:     Family History   Problem Relation Age of Onset    Lung cancer Mother     Cancer Mother     Ovarian cancer Maternal Grandmother     Glaucoma Paternal Grandmother     Asthma Son     Hypothyroidism Daughter     Thyroid disease Daughter     Substance Abuse Brother     Alcohol abuse Neg Hx     Depression Neg Hx     Drug abuse Neg Hx     Diabetes Neg Hx     Hypertension Neg Hx     Heart disease Neg Hx     Stroke Neg Hx        History Review: The following portions of the patient's history were reviewed and updated as appropriate: allergies, current medications, past family history, past medical history, past social history, past surgical history and problem list          OBJECTIVE:     Vital signs in last 24 hours: There were no vitals filed for this visit      Mental Status Evaluation:    Appearance age appropriate, casually dressed, dressed appropriately, looks stated age   Behavior pleasant, cooperative, calm, good eye contact   Speech normal rate, normal volume, normal pitch   Mood dysphoric, anxious   Affect constricted   Thought Processes organized, logical, goal directed   Associations intact associations   Thought Content no overt delusions   Perceptual Disturbances: no auditory hallucinations, no visual hallucinations   Abnormal Thoughts  Risk Potential Suicidal ideation - None at present  Homicidal ideation - None at present  Potential for aggression - No   Orientation oriented to person, place, time/date and situation   Memory recent and remote memory grossly intact   Consciousness alert and awake   Attention Span Concentration Span attention span and concentration are age appropriate   Intellect appears to be of average intelligence   Insight intact and good   Judgement intact and good   Muscle Strength and  Gait normal gait and normal balance   Motor activity no abnormal movements   Language no difficulty naming common objects   Fund of Knowledge adequate knowledge of current events   Pain none   Pain Scale 0       Laboratory Results: I have personally reviewed all pertinent laboratory/tests results    Recent Labs (last 4 months):   Appointment on 06/21/2022   Component Date Value    Sodium 06/21/2022 139     Potassium 06/21/2022 4 1     Chloride 06/21/2022 106     CO2 06/21/2022 27     ANION GAP 06/21/2022 6     BUN 06/21/2022 18     Creatinine 06/21/2022 0 75     Glucose, Fasting 06/21/2022 84     Calcium 06/21/2022 9 3     AST 06/21/2022 15     ALT 06/21/2022 12     Alkaline Phosphatase 06/21/2022 56     Total Protein 06/21/2022 7 3     Albumin 06/21/2022 4 3     Total Bilirubin 06/21/2022 0 40     eGFR 06/21/2022 85     WBC 06/21/2022 7 69     RBC 06/21/2022 4 97     Hemoglobin 06/21/2022 13 9     Hematocrit 06/21/2022 41 8     MCV 06/21/2022 84     MCH 06/21/2022 28 0     MCHC 06/21/2022 33 3     RDW 06/21/2022 15 7 (A)    MPV 06/21/2022 9 4     Platelets 61/57/6477 273     nRBC 06/21/2022 0     Neutrophils Relative 06/21/2022 54     Immat GRANS % 06/21/2022 0     Lymphocytes Relative 06/21/2022 38     Monocytes Relative 06/21/2022 6     Eosinophils Relative 06/21/2022 2     Basophils Relative 06/21/2022 0     Neutrophils Absolute 06/21/2022 4 07     Immature Grans Absolute 06/21/2022 0 02     Lymphocytes Absolute 06/21/2022 2 90     Monocytes Absolute 06/21/2022 0 49     Eosinophils Absolute 06/21/2022 0 18     Basophils Absolute 06/21/2022 0 03     TSH 3RD GENERATON 06/21/2022 5 916 (A)    Vit D, 25-Hydroxy 06/21/2022 36 6     Free T4 06/21/2022 1 17        Suicide/Homicide Risk Assessment:    The following interventions are recommended: no intervention changes needed      Lethality Statement:    Based on today's assessment and clinical criteria, Deo Lopez contracts for safety and is not an imminent risk of harm to self or others  Outpatient level of care is deemed appropriate at this current time  Karlo Rasmussen understands that if they can no longer contract for safety, they need to call the office or report to their nearest Emergency Room for immediate evaluation  Assessment/Plan:     Luanne Calderon a 57 y  o  female with past psychiatric history significant for major depressive disorder, generalized anxiety with panic attacks, ADHD (inattentive type), and insomnia who was personally seen and evaluated today at the [address] [place] [address] [address] outpatient clinic for follow-up and medication management  Jeri endorses worsening anxiety, "panic attacks", and intermittent depressive symptomatology over the past 10 years in the context of numerous psychosocial stressors including financial instability, familial discord, and homelessness  Pricila Mancilla states that her life was "normal" until her  was arrested and incarcerated  As such, she has been increasingly more depressed and notices a profound increase in debilitating anxiety and subsequent panic attacks  Aggravating factors include familial stress, poor finances, and "being out in society"  Alleviating factors include mindfulness and self-help books about guided imagery  Her PCP prescribed Xanax 1mg PO BID with limited benefit  In addition to anxiousness, Pricila Mancilla endorses several neurovegetative symptoms of depression  Her sleep is poor and her appetite is limited  She endorses a 30lb weight gain over the last year without significant dietary intake  Her energy is "nonexhistent" and her ability to concentrate is quite limited  Her PCP prescribed Adderall 5mg PO BID given her history of ADHD with fair benefit  Pricila Mancilla endorses anhedonia, limited interaction with grandchildren, and episodic hopelessness and guilt  In the past, Pricila Mancilla was treated by Dr Nilton Hilton with Zoloft which was optimized to 200mg  She endorsed limited improvement in mood and severe nightmares so this agent was discontinued  Jeri denies acute or chronic symptomatology suggestive of tiffany or hypomania and she denies perceptual disturbances such as A/V hallucinations, paranoia, ideas of reference or delusional thoughts  She denies recent ETOH or illicit substance abuse but does smoke cigarretes      Today's Plan:    Psychopharmacologically, Jeri reports tolerability and benefit from current medication regimen   She was agreeable to move Pristiq from QHS to Daily dosing to mitigate it's impact on her sleep, which has been problematic  Will also increase PRN Hydroxyzine dose from 25mg BID to 25-50mg BID for breakthrough anxiety   Risks/benefits/alternativies to treatment discussed, including a myriad of potential adverse medication side effects, to which Tawannacherelle Matiasreg voiced understanding and consented fully to treatment          DSM-V Diagnoses:      1 ) Generalized anxiety disorder with panic attacks  2 ) Major Depressive Disorder, recurrent episode, moderate  3 ) ADHD, adult type  4 ) Insomnia        Treatment Recommendations/Precautions:     1 ) Generalized anxiety disorder with panic attacks   - Hx of Zoloft trial with limited benefit  - Continue Desvenlafaxine (Pristiq) 100mg PO Daily - move from QHS to daily dosing secondary to disrupted sleep in the evening  - Optimize PRN Hydroxyzine 25mg BID to 25-50mg BID PRN for breakthrough anxiety  - Psychoeducation provided regarding the importance of exercise and healthy dietary choices and their impact on mood, energy, and motivation  - Counseled to avoid ETOH, illict substances, and nicotine secondary to the detrimental effects of these substances on mental and physical health  - Encouraged to engage in non-verbal forms of therapy such as art therapy, music therapy, and mindfulness     2 ) Major Depressive Disorder, recurrent episode, moderate  - Hx of Zoloft trial with limited benefit  - Continue Desvenlafaxine (Pristiq) 100mg PO Daily - move from QHS to daily dosing secondary to disrupted sleep in the evening     3 ) ADHD, adult type  - Originally prescribed by PCP - Adderall 5mg PO BID  - Continue Adderall IR 10mg BID to limit difficulties with sleep initiation and to assist with early AM focus  - Monitor for adverse medication reactions      4 ) Insomnia  - Currently seeing sleep medicine  - Sleep Study for [facility] on 4/14/2021 - missed appointment volitionally               - Needs to see sleep medicine ASAP  - Continue Trazodone 250mg QHS to assist with sleep and mood       Medication management every 3 months  Will start individual therapy with own therapist  Aware of need to follow up with family physician for medical issues  Aware of 24 hour and weekend coverage for urgent situations accessed by calling [facility] main practice number    Medications Risks/Benefits      Risks, Benefits And Possible Side Effects Of Medications:    Risks, benefits, and possible side effects of medications explained to Janice Garay including risk of suicidality and serotonin syndrome related to treatment with antidepressants and risks of cardiovascular side effects including elevated blood pressure, risk of misuse, abuse or dependence and risk of increased anxiety related to treatment with stimulant medications  She verbalizes understanding and agreement for treatment  Controlled Medication Discussion:     Janice Garay has been filling controlled prescriptions on time as prescribed according to [facility] [employer] Monitoring [facility]    Psychotherapy Provided:     Individual psychotherapy provided: Yes  Counseling was provided during the session today for 16 minutes  Medications, treatment progress and treatment plan reviewed with Janice Garay  Medication changes discussed with Janice Garay  Medication education provided to Janice Garay  Coping strategies reviewed with Janice Garay  Educated on importance of medication and treatment compliance  Importance of follow up with family physician for medical issues reviewed with Janice Garay  Supportive therapy provided  Treatment Plan:    Completed and signed during the session: Yes - Treatment Plan done but not signed at time of office visit due to:  Plan reviewed in person and verbal consent given due to Juan social distancing    Note Share Disclaimer:      This note was not shared with the patient due to reasonable likelihood of causing patient harm      Adriana Castañeda MD  Board Certified Diplomate of the American Board of Psychiatry and Neurology  08/29/22

## 2022-09-19 DIAGNOSIS — F41.1 GENERALIZED ANXIETY DISORDER WITH PANIC ATTACKS: ICD-10-CM

## 2022-09-19 DIAGNOSIS — G47.00 INSOMNIA: ICD-10-CM

## 2022-09-19 DIAGNOSIS — F90.2 ADHD (ATTENTION DEFICIT HYPERACTIVITY DISORDER), COMBINED TYPE: ICD-10-CM

## 2022-09-19 DIAGNOSIS — F33.1 MAJOR DEPRESSIVE DISORDER, RECURRENT EPISODE, MODERATE (HCC): ICD-10-CM

## 2022-09-19 DIAGNOSIS — F41.0 GENERALIZED ANXIETY DISORDER WITH PANIC ATTACKS: ICD-10-CM

## 2022-09-19 DIAGNOSIS — E03.9 ACQUIRED HYPOTHYROIDISM: ICD-10-CM

## 2022-09-19 RX ORDER — HYDROXYZINE HYDROCHLORIDE 25 MG/1
TABLET, FILM COATED ORAL
Qty: 90 TABLET | Refills: 3 | Status: SHIPPED | OUTPATIENT
Start: 2022-09-19

## 2022-09-19 RX ORDER — TRAZODONE HYDROCHLORIDE 100 MG/1
100 TABLET ORAL
Qty: 30 TABLET | Refills: 3 | Status: SHIPPED | OUTPATIENT
Start: 2022-09-19

## 2022-09-19 RX ORDER — DESVENLAFAXINE 100 MG/1
100 TABLET, EXTENDED RELEASE ORAL DAILY
Qty: 30 TABLET | Refills: 4 | Status: SHIPPED | OUTPATIENT
Start: 2022-09-19

## 2022-09-19 RX ORDER — DEXTROAMPHETAMINE SACCHARATE, AMPHETAMINE ASPARTATE, DEXTROAMPHETAMINE SULFATE AND AMPHETAMINE SULFATE 2.5; 2.5; 2.5; 2.5 MG/1; MG/1; MG/1; MG/1
1 TABLET ORAL 2 TIMES DAILY
Qty: 60 TABLET | Refills: 0 | Status: SHIPPED | OUTPATIENT
Start: 2022-09-19 | End: 2022-10-19

## 2022-09-19 NOTE — TELEPHONE ENCOUNTER
Medication Refill Request     Name of Medication Adderall   Dose/Frequency 10mg 2 x Daily   Quantity 60  Verified pharmacy   [x]  Verified ordering Provider   [x]  Does patient have enough for the next 3 days? Yes [x] No []  Does patient have a follow-up appointment scheduled? Yes [x] No []   If so when is appointment: 11/29/22    Medication Refill Request     Name of Medication Pristiq  Dose/Frequency 100 Mg 1x daily   Quantity 30  Verified pharmacy   [x]  Verified ordering Provider   [x]  Does patient have enough for the next 3 days? Yes [x] No []  Does patient have a follow-up appointment scheduled? Yes [x] No []   If so when is appointment: 11/29/22    Medication Refill Request     Name of Medication Atarax  Dose/Frequency 25 mg 3x daily as need  Quantity 90  Verified pharmacy   [x]  Verified ordering Provider   [x]  Does patient have enough for the next 3 days? Yes [x] No []  Does patient have a follow-up appointment scheduled? Yes [x] No []   If so when is appointment: 11/29/22    Medication Refill Request     Name of Medication Desyrel   Dose/Frequency 100 Mg 1 x daily   Quantity 30  Verified pharmacy   [x]  Verified ordering Provider   [x]  Does patient have enough for the next 3 days? Yes [x] No []  Does patient have a follow-up appointment scheduled?  Yes [x] No []   If so when is appointment: 11/29/22

## 2022-10-05 DIAGNOSIS — E03.9 ACQUIRED HYPOTHYROIDISM: ICD-10-CM

## 2022-10-06 RX ORDER — LEVOTHYROXINE SODIUM 0.07 MG/1
TABLET ORAL
Qty: 90 TABLET | Refills: 0 | Status: SHIPPED | OUTPATIENT
Start: 2022-10-06

## 2022-11-04 DIAGNOSIS — F41.0 GENERALIZED ANXIETY DISORDER WITH PANIC ATTACKS: ICD-10-CM

## 2022-11-04 DIAGNOSIS — G47.00 INSOMNIA: ICD-10-CM

## 2022-11-04 DIAGNOSIS — F41.1 GENERALIZED ANXIETY DISORDER WITH PANIC ATTACKS: ICD-10-CM

## 2022-11-04 DIAGNOSIS — F90.2 ADHD (ATTENTION DEFICIT HYPERACTIVITY DISORDER), COMBINED TYPE: ICD-10-CM

## 2022-11-04 RX ORDER — DEXTROAMPHETAMINE SACCHARATE, AMPHETAMINE ASPARTATE, DEXTROAMPHETAMINE SULFATE AND AMPHETAMINE SULFATE 2.5; 2.5; 2.5; 2.5 MG/1; MG/1; MG/1; MG/1
1 TABLET ORAL 2 TIMES DAILY
Qty: 60 TABLET | Refills: 0 | Status: SHIPPED | OUTPATIENT
Start: 2022-11-04 | End: 2022-12-04

## 2022-11-04 RX ORDER — TRAZODONE HYDROCHLORIDE 100 MG/1
100 TABLET ORAL
Qty: 30 TABLET | Refills: 3 | Status: SHIPPED | OUTPATIENT
Start: 2022-11-04

## 2022-11-04 RX ORDER — HYDROXYZINE HYDROCHLORIDE 25 MG/1
TABLET, FILM COATED ORAL
Qty: 90 TABLET | Refills: 3 | Status: SHIPPED | OUTPATIENT
Start: 2022-11-04

## 2022-11-04 NOTE — TELEPHONE ENCOUNTER
PDMP website reviewed  Russel Reynolds has been appropriately adherent to controlled psychotropic medications without evidence of abuse or misuse  As such, will send 30-day refill to pharmacy of choice and follow up as necessary

## 2022-11-04 NOTE — TELEPHONE ENCOUNTER
Medication Refill Request     Name of Medication Adderall   Dose/Frequency 10 mg 1 tablet   Quantity 60  Verified pharmacy   [x]  Verified ordering Provider   [x]  Does patient have enough for the next 3 days? Yes [] No [x]  Does patient have a follow-up appointment scheduled? Yes [x] No []   If so when is appointment: 11/29    Medication Refill Request     Name of Medication Atarax  Dose/Frequency 25 mg 1 tablet  Quantity 90  Verified pharmacy   [x]  Verified ordering Provider   [x]  Does patient have enough for the next 3 days? Yes [x] No []  Does patient have a follow-up appointment scheduled? Yes [x] No []   If so when is appointment: 11/29    Medication Refill Request     Name of Medication Desyrel  Dose/Frequency 100 mg 1 tablet  Quantity 30  Verified pharmacy   [x]  Verified ordering Provider   [x]  Does patient have enough for the next 3 days? Yes [x] No []  Does patient have a follow-up appointment scheduled?  Yes [x] No []   If so when is appointment: 11/29

## 2022-12-13 DIAGNOSIS — F41.0 GENERALIZED ANXIETY DISORDER WITH PANIC ATTACKS: ICD-10-CM

## 2022-12-13 DIAGNOSIS — F90.2 ADHD (ATTENTION DEFICIT HYPERACTIVITY DISORDER), COMBINED TYPE: ICD-10-CM

## 2022-12-13 DIAGNOSIS — F41.1 GENERALIZED ANXIETY DISORDER WITH PANIC ATTACKS: ICD-10-CM

## 2022-12-13 NOTE — TELEPHONE ENCOUNTER
Medication Refill Request     Name of Medication Adderal   Dose/Frequency 10 mg 2x day  Quantity 60 tabs   Verified pharmacy   [x]  Verified ordering Provider   [x]  Does patient have enough for the next 3 days? Yes [x] No []  Does patient have a follow-up appointment scheduled? Yes [] No [x]   If so when is appointment: n/a    Medication Refill Request     Name of Medication Hydroxyzine  Dose/Frequency 25 mg 3x day  Quantity 90 tabs  Verified pharmacy   [x]  Verified ordering Provider   [x]  Does patient have enough for the next 3 days? Yes [x] No []  Does patient have a follow-up appointment scheduled?  Yes [] No [x]   If so when is appointment: n/a

## 2022-12-14 RX ORDER — HYDROXYZINE HYDROCHLORIDE 25 MG/1
TABLET, FILM COATED ORAL
Qty: 90 TABLET | Refills: 3 | Status: SHIPPED | OUTPATIENT
Start: 2022-12-14

## 2022-12-14 RX ORDER — DEXTROAMPHETAMINE SACCHARATE, AMPHETAMINE ASPARTATE, DEXTROAMPHETAMINE SULFATE AND AMPHETAMINE SULFATE 2.5; 2.5; 2.5; 2.5 MG/1; MG/1; MG/1; MG/1
1 TABLET ORAL 2 TIMES DAILY
Qty: 60 TABLET | Refills: 0 | Status: SHIPPED | OUTPATIENT
Start: 2022-12-14 | End: 2023-01-13

## 2022-12-14 NOTE — TELEPHONE ENCOUNTER
PDMP website reviewed  Deepali Calloway has been appropriately adherent to controlled psychotropic medications without evidence of abuse or misuse  As such, will send 30-day refill to pharmacy of choice and follow up as necessary

## 2023-01-20 DIAGNOSIS — G47.00 INSOMNIA: ICD-10-CM

## 2023-01-20 DIAGNOSIS — F41.1 GENERALIZED ANXIETY DISORDER WITH PANIC ATTACKS: ICD-10-CM

## 2023-01-20 DIAGNOSIS — E03.9 ACQUIRED HYPOTHYROIDISM: ICD-10-CM

## 2023-01-20 DIAGNOSIS — F90.2 ADHD (ATTENTION DEFICIT HYPERACTIVITY DISORDER), COMBINED TYPE: ICD-10-CM

## 2023-01-20 DIAGNOSIS — F33.1 MAJOR DEPRESSIVE DISORDER, RECURRENT EPISODE, MODERATE (HCC): ICD-10-CM

## 2023-01-20 DIAGNOSIS — F41.0 GENERALIZED ANXIETY DISORDER WITH PANIC ATTACKS: ICD-10-CM

## 2023-01-20 RX ORDER — LEVOTHYROXINE SODIUM 0.07 MG/1
75 TABLET ORAL DAILY
Qty: 90 TABLET | Refills: 0 | Status: SHIPPED | OUTPATIENT
Start: 2023-01-20

## 2023-01-20 RX ORDER — DEXTROAMPHETAMINE SACCHARATE, AMPHETAMINE ASPARTATE, DEXTROAMPHETAMINE SULFATE AND AMPHETAMINE SULFATE 2.5; 2.5; 2.5; 2.5 MG/1; MG/1; MG/1; MG/1
1 TABLET ORAL 2 TIMES DAILY
Qty: 60 TABLET | Refills: 0 | Status: SHIPPED | OUTPATIENT
Start: 2023-01-20 | End: 2023-02-19

## 2023-01-20 NOTE — TELEPHONE ENCOUNTER
Medication Refill Request     Name of Medication adderall  Dose/Frequency 10 mg 1 tablet two times a day  Quantity 60  Verified pharmacy   [x]  Verified ordering Provider   [x]  Does patient have enough for the next 3 days? Yes [] No [x]  Does patient have a follow-up appointment scheduled? Yes [x] No []   If so when is appointment: 4/5/2023 at 11:30 a m

## 2023-01-20 NOTE — TELEPHONE ENCOUNTER
PDMP website reviewed  Johny Walsh has been appropriately adherent to controlled psychotropic medications without evidence of abuse or misuse  As such, will send 30-day refill to pharmacy of choice and follow up as necessary

## 2023-01-31 ENCOUNTER — APPOINTMENT (OUTPATIENT)
Dept: LAB | Facility: CLINIC | Age: 63
End: 2023-01-31

## 2023-01-31 ENCOUNTER — VBI (OUTPATIENT)
Dept: ADMINISTRATIVE | Facility: OTHER | Age: 63
End: 2023-01-31

## 2023-01-31 DIAGNOSIS — E03.9 ACQUIRED HYPOTHYROIDISM: ICD-10-CM

## 2023-01-31 LAB
T4 FREE SERPL-MCNC: 1.08 NG/DL (ref 0.76–1.46)
TSH SERPL DL<=0.05 MIU/L-ACNC: 6.13 UIU/ML (ref 0.45–4.5)

## 2023-02-01 ENCOUNTER — OFFICE VISIT (OUTPATIENT)
Dept: LAB | Facility: CLINIC | Age: 63
End: 2023-02-01

## 2023-02-01 ENCOUNTER — OFFICE VISIT (OUTPATIENT)
Dept: INTERNAL MEDICINE CLINIC | Facility: CLINIC | Age: 63
End: 2023-02-01

## 2023-02-01 VITALS
BODY MASS INDEX: 31.36 KG/M2 | TEMPERATURE: 97.5 F | HEIGHT: 63 IN | HEART RATE: 84 BPM | SYSTOLIC BLOOD PRESSURE: 122 MMHG | DIASTOLIC BLOOD PRESSURE: 90 MMHG | OXYGEN SATURATION: 99 % | WEIGHT: 177 LBS

## 2023-02-01 DIAGNOSIS — E78.00 PURE HYPERCHOLESTEROLEMIA: ICD-10-CM

## 2023-02-01 DIAGNOSIS — Z12.31 ENCOUNTER FOR SCREENING MAMMOGRAM FOR BREAST CANCER: ICD-10-CM

## 2023-02-01 DIAGNOSIS — Z71.9 HEALTH COUNSELING: ICD-10-CM

## 2023-02-01 DIAGNOSIS — F41.1 GENERALIZED ANXIETY DISORDER WITH PANIC ATTACKS: ICD-10-CM

## 2023-02-01 DIAGNOSIS — R07.89 OTHER CHEST PAIN: ICD-10-CM

## 2023-02-01 DIAGNOSIS — K59.04 CHRONIC IDIOPATHIC CONSTIPATION: ICD-10-CM

## 2023-02-01 DIAGNOSIS — F33.1 MAJOR DEPRESSIVE DISORDER, RECURRENT EPISODE, MODERATE (HCC): ICD-10-CM

## 2023-02-01 DIAGNOSIS — R06.02 SOB (SHORTNESS OF BREATH): Primary | ICD-10-CM

## 2023-02-01 DIAGNOSIS — F90.2 ADHD (ATTENTION DEFICIT HYPERACTIVITY DISORDER), COMBINED TYPE: ICD-10-CM

## 2023-02-01 DIAGNOSIS — M85.80 OSTEOPENIA, UNSPECIFIED LOCATION: ICD-10-CM

## 2023-02-01 DIAGNOSIS — F41.0 GENERALIZED ANXIETY DISORDER WITH PANIC ATTACKS: ICD-10-CM

## 2023-02-01 DIAGNOSIS — Z79.899 ENCOUNTER FOR LONG-TERM CURRENT USE OF MEDICATION: ICD-10-CM

## 2023-02-01 DIAGNOSIS — E66.9 OBESITY, CLASS II, BMI 35-39.9: ICD-10-CM

## 2023-02-01 DIAGNOSIS — E55.9 VITAMIN D DEFICIENCY: ICD-10-CM

## 2023-02-01 DIAGNOSIS — E03.9 ACQUIRED HYPOTHYROIDISM: ICD-10-CM

## 2023-02-01 DIAGNOSIS — I77.810 MILD DILATION OF ASCENDING AORTA (HCC): ICD-10-CM

## 2023-02-01 PROBLEM — U07.1 COVID-19: Status: RESOLVED | Noted: 2021-01-27 | Resolved: 2023-02-01

## 2023-02-01 PROBLEM — G93.32 POST-COVID CHRONIC FATIGUE: Status: RESOLVED | Noted: 2022-06-21 | Resolved: 2023-02-01

## 2023-02-01 PROBLEM — R63.5 WEIGHT GAIN: Status: RESOLVED | Noted: 2021-02-10 | Resolved: 2023-02-01

## 2023-02-01 PROBLEM — U09.9 POST-COVID CHRONIC FATIGUE: Status: RESOLVED | Noted: 2022-06-21 | Resolved: 2023-02-01

## 2023-02-01 PROBLEM — M79.89 LEG SWELLING: Status: ACTIVE | Noted: 2023-02-01

## 2023-02-01 PROBLEM — M25.531 RIGHT WRIST PAIN: Status: ACTIVE | Noted: 2023-02-01

## 2023-02-01 RX ORDER — LEVOTHYROXINE SODIUM 0.07 MG/1
TABLET ORAL
Qty: 90 TABLET | Refills: 0
Start: 2023-02-01

## 2023-02-01 RX ORDER — LINACLOTIDE 145 UG/1
145 CAPSULE, GELATIN COATED ORAL DAILY
Qty: 30 CAPSULE | Refills: 5 | Status: SHIPPED | OUTPATIENT
Start: 2023-02-01 | End: 2023-07-31

## 2023-02-01 NOTE — PROGRESS NOTES
Assessment/Plan:    Acquired hypothyroidism  Taking medication appropriately  Mild symptoms, dose adjusted  Chronic idiopathic constipation  Restart Linzess  Generalized anxiety disorder with panic attacks  Stable  On Pristiq, taking hydroxyzine  Major depressive disorder, recurrent episode, moderate ([address] )  As above  ADHD (attention deficit hyperactivity disorder), combined type  Taking Adderall prn, per psych  Mild dilation of ascending aorta (HCC)  No recent imaging  Followed by cardiology  Insomnia  On trazodone  Per psych  Obesity, Class II, BMI 35-39 9  No weight change  Previously saw bariatrics  Tobacco use  Still smoking 1 pack every few days  Other fatigue  Has not done sleep study  Pure hypercholesterolemia  Lipids due  GERD (gastroesophageal reflux disease)  No recent symptoms  Leg swelling  Recommend to start using compression stockings daily  Osteopenia  Bone density due  Right wrist pain  Suspect tendonitis due to overuse  Declined Ortho referral   Recommend to use wrist brace qHS  Diagnoses and all orders for this visit:    SOB (shortness of breath)  Comments:  Agrees to proceed with cardiac evaluation, she suspects symptoms due to anxiety  Orders:  -     XR chest pa & lateral; Future  -     Echo stress test, exercise; Future    Other chest pain  -     ECG 12 lead; Future  -     XR chest pa & lateral; Future  -     Echo stress test, exercise; Future    Pure hypercholesterolemia  -     CBC and differential  -     Comprehensive metabolic panel  -     Cancel: TSH, 3rd generation with Free T4 reflex  -     Lipid panel    Major depressive disorder, recurrent episode, moderate (HCC)  -     CBC and differential  -     Comprehensive metabolic panel    ADHD (attention deficit hyperactivity disorder), combined type    Vitamin D deficiency    Obesity, Class II, BMI 35-39 9    Chronic idiopathic constipation  -     linaCLOtide (Linzess) 145 MCG CAPS;  Take 1 capsule (145 mcg total) by mouth daily    Encounter for long-term current use of medication  -     Vitamin B12    Encounter for screening mammogram for breast cancer  -     Mammo screening bilateral w 3d & cad; Future    Mild dilation of ascending aorta (HCC)    Generalized anxiety disorder with panic attacks    Osteopenia, unspecified location  -     Vitamin D 25 hydroxy  -     DXA bone density spine hip and pelvis; Future    Acquired hypothyroidism  -     TSH, 3rd generation with Free T4 reflex; Future  -     levothyroxine 75 mcg tablet; Take 2 tabs x 1 day a week, 1 tab the rest of the week    Health counseling  Comments:  Declined flu vaccine  Mammogram due  Subjective:      Patient ID: Rachana Gallagher is a 57 y o  female here for a follow up  She is here with her  today  She has multiple complaints  First, she complains of feeling short of breath with activity especially with stair climbing  She also experiences occasional chest pains, nonradiating  Sometimes symptoms may occur together  She is also experiencing occasional fluttering of her heart  She thinks it is all due to anxiety due to increased stress at home  Her son and 3year-old grandson is living with them  Recalls having a Holter monitor done in the past which was normal     Second, she complains of constipation  Her Linzess was not refilled and she moves her bowels every few days  Third, she complains of bilateral leg swelling  Swelling is worse at the end of a workday where she is on her feet frequently  She recalls injuring her ankle several years ago, no fracture  Fourth, she complains of more frequent right hand and wrist pain  She is left-handed but she does all her work with her right hand such as slicing and chopping  She reports no injury or falls, no does it is swollen frequently  She has not applied or taken any medication for this  Fifth, she continues to see her psychiatrist regularly    She only takes Adderall as needed  Lastly, she is concerned about her thyroid  Does take her medication regularly  The following portions of the patient's history were reviewed and updated as appropriate: allergies, current medications, past medical history, past social history and problem list     Review of Systems   Constitutional: Negative for activity change, appetite change and fatigue  HENT: Negative for congestion, ear pain and postnasal drip  Eyes: Negative for visual disturbance  Respiratory: Positive for chest tightness and shortness of breath  Negative for cough  Cardiovascular: Positive for chest pain and leg swelling  Gastrointestinal: Positive for constipation  Negative for abdominal pain and diarrhea  Genitourinary: Negative for dysuria and frequency  Musculoskeletal: Positive for arthralgias  Negative for joint swelling and myalgias  Skin: Negative for rash and wound  Neurological: Negative for dizziness, numbness and headaches  Hematological: Does not bruise/bleed easily  Psychiatric/Behavioral: Negative for confusion  The patient is nervous/anxious  Objective:      /90   Pulse 84   Temp 97 5 °F (36 4 °C)   Ht 5' 3" (1 6 m)   Wt 80 3 kg (177 lb)   SpO2 99%   BMI 31 35 kg/m²          Physical Exam  Vitals and nursing note reviewed  Constitutional:       General: She is not in acute distress  Appearance: She is well-developed  HENT:      Head: Normocephalic and atraumatic  Eyes:      Pupils: Pupils are equal, round, and reactive to light  Cardiovascular:      Rate and Rhythm: Normal rate and regular rhythm  Heart sounds: Normal heart sounds  Pulmonary:      Effort: Pulmonary effort is normal       Breath sounds: Normal breath sounds  No wheezing  Abdominal:      General: Bowel sounds are normal       Palpations: Abdomen is soft  Musculoskeletal:         General: No swelling  Right lower le+ Edema present        Left lower leg: 1+ Edema present  Skin:     General: Skin is warm  Findings: No rash  Neurological:      General: No focal deficit present  Mental Status: She is alert and oriented to person, place, and time  Psychiatric:         Mood and Affect: Mood and affect normal  Mood is not anxious or depressed  Behavior: Behavior normal          Labs & imaging results reviewed with patient  BMI Counseling: Body mass index is 31 35 kg/m²  The BMI is above normal  Nutrition recommendations include decreasing overall calorie intake, 3-5 servings of fruits/vegetables daily and consuming healthier snacks  Exercise recommendations include joining a gym and strength training exercises

## 2023-02-02 LAB
ATRIAL RATE: 58 BPM
P AXIS: 49 DEGREES
PR INTERVAL: 160 MS
QRS AXIS: 80 DEGREES
QRSD INTERVAL: 98 MS
QT INTERVAL: 406 MS
QTC INTERVAL: 398 MS
T WAVE AXIS: 45 DEGREES
VENTRICULAR RATE: 58 BPM

## 2023-02-07 ENCOUNTER — APPOINTMENT (OUTPATIENT)
Dept: LAB | Facility: CLINIC | Age: 63
End: 2023-02-07

## 2023-02-07 ENCOUNTER — HOSPITAL ENCOUNTER (OUTPATIENT)
Dept: RADIOLOGY | Facility: HOSPITAL | Age: 63
Discharge: HOME | End: 2023-02-07

## 2023-02-07 DIAGNOSIS — R07.89 OTHER CHEST PAIN: ICD-10-CM

## 2023-02-07 DIAGNOSIS — R06.02 SOB (SHORTNESS OF BREATH): ICD-10-CM

## 2023-02-07 LAB
25(OH)D3 SERPL-MCNC: 23.8 NG/ML (ref 30–100)
ALBUMIN SERPL BCP-MCNC: 4 G/DL (ref 3.5–5)
ALP SERPL-CCNC: 55 U/L (ref 34–104)
ALT SERPL W P-5'-P-CCNC: 11 U/L (ref 7–52)
ANION GAP SERPL CALCULATED.3IONS-SCNC: 4 MMOL/L (ref 4–13)
AST SERPL W P-5'-P-CCNC: 13 U/L (ref 13–39)
BASOPHILS # BLD AUTO: 0.03 THOUSANDS/ÂΜL (ref 0–0.1)
BASOPHILS NFR BLD AUTO: 1 % (ref 0–1)
BILIRUB SERPL-MCNC: 0.37 MG/DL (ref 0.2–1)
BUN SERPL-MCNC: 21 MG/DL (ref 5–25)
CALCIUM SERPL-MCNC: 9.2 MG/DL (ref 8.4–10.2)
CHLORIDE SERPL-SCNC: 106 MMOL/L (ref 96–108)
CHOLEST SERPL-MCNC: 193 MG/DL
CO2 SERPL-SCNC: 29 MMOL/L (ref 21–32)
CREAT SERPL-MCNC: 0.82 MG/DL (ref 0.6–1.3)
EOSINOPHIL # BLD AUTO: 0.18 THOUSAND/ÂΜL (ref 0–0.61)
EOSINOPHIL NFR BLD AUTO: 3 % (ref 0–6)
ERYTHROCYTE [DISTWIDTH] IN BLOOD BY AUTOMATED COUNT: 15.3 % (ref 11.6–15.1)
GFR SERPL CREATININE-BSD FRML MDRD: 75 ML/MIN/1.73SQ M
GLUCOSE P FAST SERPL-MCNC: 73 MG/DL (ref 65–99)
HCT VFR BLD AUTO: 39.9 % (ref 34.8–46.1)
HDLC SERPL-MCNC: 64 MG/DL
HGB BLD-MCNC: 13 G/DL (ref 11.5–15.4)
IMM GRANULOCYTES # BLD AUTO: 0.02 THOUSAND/UL (ref 0–0.2)
IMM GRANULOCYTES NFR BLD AUTO: 0 % (ref 0–2)
LDLC SERPL CALC-MCNC: 118 MG/DL (ref 0–100)
LYMPHOCYTES # BLD AUTO: 2.72 THOUSANDS/ÂΜL (ref 0.6–4.47)
LYMPHOCYTES NFR BLD AUTO: 46 % (ref 14–44)
MCH RBC QN AUTO: 27.6 PG (ref 26.8–34.3)
MCHC RBC AUTO-ENTMCNC: 32.6 G/DL (ref 31.4–37.4)
MCV RBC AUTO: 85 FL (ref 82–98)
MONOCYTES # BLD AUTO: 0.44 THOUSAND/ÂΜL (ref 0.17–1.22)
MONOCYTES NFR BLD AUTO: 8 % (ref 4–12)
NEUTROPHILS # BLD AUTO: 2.47 THOUSANDS/ÂΜL (ref 1.85–7.62)
NEUTS SEG NFR BLD AUTO: 42 % (ref 43–75)
NONHDLC SERPL-MCNC: 129 MG/DL
NRBC BLD AUTO-RTO: 0 /100 WBCS
PLATELET # BLD AUTO: 250 THOUSANDS/UL (ref 149–390)
PMV BLD AUTO: 9.3 FL (ref 8.9–12.7)
POTASSIUM SERPL-SCNC: 4.5 MMOL/L (ref 3.5–5.3)
PROT SERPL-MCNC: 6.9 G/DL (ref 6.4–8.4)
RBC # BLD AUTO: 4.71 MILLION/UL (ref 3.81–5.12)
SODIUM SERPL-SCNC: 139 MMOL/L (ref 135–147)
TRIGL SERPL-MCNC: 56 MG/DL
VIT B12 SERPL-MCNC: 342 PG/ML (ref 100–900)
WBC # BLD AUTO: 5.86 THOUSAND/UL (ref 4.31–10.16)

## 2023-02-08 ENCOUNTER — TELEPHONE (OUTPATIENT)
Dept: INTERNAL MEDICINE CLINIC | Facility: CLINIC | Age: 63
End: 2023-02-08

## 2023-02-08 DIAGNOSIS — M25.572 CHRONIC PAIN OF LEFT ANKLE: ICD-10-CM

## 2023-02-08 DIAGNOSIS — E78.00 PURE HYPERCHOLESTEROLEMIA: Primary | ICD-10-CM

## 2023-02-08 DIAGNOSIS — G89.29 CHRONIC PAIN OF LEFT ANKLE: ICD-10-CM

## 2023-02-10 RX ORDER — ATORVASTATIN CALCIUM 10 MG/1
10 TABLET, FILM COATED ORAL DAILY
Qty: 90 TABLET | Refills: 1 | Status: SHIPPED | OUTPATIENT
Start: 2023-02-10

## 2023-02-10 NOTE — TELEPHONE ENCOUNTER
Lipitor, please  [facility] [address]  Patients  states that she is wearing the stockings  It helps for swelling but not the ankle pain  If it is arthritis, how do we determine or dx it as this? Asking drs advise on this

## 2023-02-10 NOTE — TELEPHONE ENCOUNTER
We can start Lipitor or Crestor  If you prefer Lipitor, that is fine  Confirm pharmacy  She should be wearing compression stockings daily, this will help with the pain and swelling, if there any  If she had a fracture in the past, she would know it because she will be unable to put weight on it and walk  It it was a fracture, then more prone to arthritis which may be causing her pain

## 2023-02-10 NOTE — TELEPHONE ENCOUNTER
Spoke with Carlos Wesley and advised  He is taking Lipitor and said researched has shown Lipitor reverses heart disease  Is there a difference between Crestor and Lipitor? Her left ankles continues to hurt especially after being on her feet 6-7 hours a day  Should she be wearing a brace or some sort of wrap to help? She injured her ankle 10 years ago  If she had a hairline fracture then that was too small to be detected, could it have gotten worse over the years?

## 2023-02-20 ENCOUNTER — HOSPITAL ENCOUNTER (OUTPATIENT)
Dept: RADIOLOGY | Facility: HOSPITAL | Age: 63
Discharge: HOME | End: 2023-02-20

## 2023-02-20 DIAGNOSIS — M25.572 CHRONIC PAIN OF LEFT ANKLE: ICD-10-CM

## 2023-02-20 DIAGNOSIS — G89.29 CHRONIC PAIN OF LEFT ANKLE: ICD-10-CM

## 2023-02-22 DIAGNOSIS — F33.1 MAJOR DEPRESSIVE DISORDER, RECURRENT EPISODE, MODERATE (HCC): ICD-10-CM

## 2023-02-22 RX ORDER — DESVENLAFAXINE 100 MG/1
TABLET, EXTENDED RELEASE ORAL
Qty: 30 TABLET | Refills: 4 | Status: SHIPPED | OUTPATIENT
Start: 2023-02-22

## 2023-03-07 ENCOUNTER — APPOINTMENT (OUTPATIENT)
Dept: LAB | Facility: CLINIC | Age: 63
End: 2023-03-07

## 2023-03-07 DIAGNOSIS — E03.9 ACQUIRED HYPOTHYROIDISM: ICD-10-CM

## 2023-03-07 LAB
T4 FREE SERPL-MCNC: 1.12 NG/DL (ref 0.76–1.46)
TSH SERPL DL<=0.05 MIU/L-ACNC: 9.43 UIU/ML (ref 0.45–4.5)

## 2023-03-08 ENCOUNTER — TELEPHONE (OUTPATIENT)
Dept: INTERNAL MEDICINE CLINIC | Facility: CLINIC | Age: 63
End: 2023-03-08

## 2023-03-08 DIAGNOSIS — E03.9 ACQUIRED HYPOTHYROIDISM: ICD-10-CM

## 2023-03-08 NOTE — TELEPHONE ENCOUNTER
Your thyroid test is very abnormal   Have you been taking your thyroid medication on an empty stomach every day? You are not supposed to eat or take other modifications for at least half an hour  If you are, we will need to adjust your dose  Let me know

## 2023-03-09 ENCOUNTER — PATIENT MESSAGE (OUTPATIENT)
Dept: INTERNAL MEDICINE CLINIC | Facility: CLINIC | Age: 63
End: 2023-03-09

## 2023-03-09 RX ORDER — LEVOTHYROXINE SODIUM 0.1 MG/1
100 TABLET ORAL
Qty: 90 TABLET | Refills: 0 | Status: SHIPPED | OUTPATIENT
Start: 2023-03-09

## 2023-03-09 NOTE — TELEPHONE ENCOUNTER
Patient does take her thyroid meds first thing in the morning on an empty stomach  Pt states it is  30 minutes plus before eating after taking medication

## 2023-03-09 NOTE — TELEPHONE ENCOUNTER
Increase thyroid medication to 100 mcg daily  New prescription sent to the pharmacy  If you still have a lot of the 75 mcg tablets, take 2 tablets 2 days a week, 1 tablet the rest of the week until gone  Repeat labs in a few weeks  Ordered, printed  Please mail to patient

## 2023-03-24 DIAGNOSIS — G47.00 INSOMNIA: ICD-10-CM

## 2023-03-24 DIAGNOSIS — F90.2 ADHD (ATTENTION DEFICIT HYPERACTIVITY DISORDER), COMBINED TYPE: ICD-10-CM

## 2023-03-24 RX ORDER — TRAZODONE HYDROCHLORIDE 100 MG/1
100 TABLET ORAL
Qty: 30 TABLET | Refills: 1 | Status: SHIPPED | OUTPATIENT
Start: 2023-03-24

## 2023-03-24 RX ORDER — DEXTROAMPHETAMINE SACCHARATE, AMPHETAMINE ASPARTATE, DEXTROAMPHETAMINE SULFATE AND AMPHETAMINE SULFATE 2.5; 2.5; 2.5; 2.5 MG/1; MG/1; MG/1; MG/1
1 TABLET ORAL 2 TIMES DAILY
Qty: 60 TABLET | Refills: 0 | Status: SHIPPED | OUTPATIENT
Start: 2023-03-24 | End: 2023-04-23

## 2023-03-24 NOTE — TELEPHONE ENCOUNTER
PDMP website reviewed  Chintan Parchman has been appropriately adherent to controlled psychotropic medications without evidence of abuse or misuse  As such, will send 30-day refill to pharmacy of choice and follow up as necessary

## 2023-03-24 NOTE — TELEPHONE ENCOUNTER
Confirmed with pharmacy patient has 1 refill for Atarax 25mg and 4 refill Pristiq 100mg  Patient has enough for the next 3 days of all medications

## 2023-04-05 ENCOUNTER — TELEPHONE (OUTPATIENT)
Dept: PSYCHIATRY | Facility: CLINIC | Age: 63
End: 2023-04-05

## 2023-04-05 NOTE — TELEPHONE ENCOUNTER
Patient is calling regarding cancelling an appointment      Date/Time: 4/5/2023 11:30am    Reason: cannot leave work    Patient was rescheduled: YES [x] NO []  If yes, when was Patient reschedule for: 7/24/2023 2:30pm    Patient requesting call back to reschedule: YES [] NO [x]

## 2023-04-05 NOTE — TELEPHONE ENCOUNTER
Patient was calling to switch her 11:30am appt from in person to virtual, writer did send msg to provider, and told patient she would be contacted as soon as a response is received

## 2023-04-12 NOTE — TELEPHONE ENCOUNTER
Patient was mistakenly scheduled for a new patient slot on 7/24/2023, appt needed to be changed, Aspen Gallardo did attempt to contact patient to reschedule, writer lvm for patient with a tentative date and time of 8/2/2023 at 2pm  and to call the office if that date and time does not work for her

## 2023-04-25 ENCOUNTER — TELEPHONE (OUTPATIENT)
Dept: PSYCHIATRY | Facility: CLINIC | Age: 63
End: 2023-04-25

## 2023-04-25 NOTE — TELEPHONE ENCOUNTER
LVM  I r/sd pt's Wednesday 8/2 appt to 8/15  Pt has not been seen since August 2022  If pt cannot do 8/15 and her appt is significantly beyond her last seen date, please check with Dr Burton Williamson before rescheduling her

## 2023-05-12 DIAGNOSIS — F41.0 GENERALIZED ANXIETY DISORDER WITH PANIC ATTACKS: ICD-10-CM

## 2023-05-12 DIAGNOSIS — F90.2 ADHD (ATTENTION DEFICIT HYPERACTIVITY DISORDER), COMBINED TYPE: ICD-10-CM

## 2023-05-12 DIAGNOSIS — F41.1 GENERALIZED ANXIETY DISORDER WITH PANIC ATTACKS: ICD-10-CM

## 2023-05-12 RX ORDER — DEXTROAMPHETAMINE SACCHARATE, AMPHETAMINE ASPARTATE, DEXTROAMPHETAMINE SULFATE AND AMPHETAMINE SULFATE 2.5; 2.5; 2.5; 2.5 MG/1; MG/1; MG/1; MG/1
1 TABLET ORAL 2 TIMES DAILY
Qty: 60 TABLET | Refills: 0 | Status: SHIPPED | OUTPATIENT
Start: 2023-05-12 | End: 2023-06-11

## 2023-05-12 RX ORDER — HYDROXYZINE HYDROCHLORIDE 25 MG/1
TABLET, FILM COATED ORAL
Qty: 90 TABLET | Refills: 3 | Status: SHIPPED | OUTPATIENT
Start: 2023-05-12

## 2023-05-12 NOTE — TELEPHONE ENCOUNTER
Medication Refill Request     Name of Medication Adderall  Dose/Frequency 10mg take 1 tab 2 times a day  Quantity 60  Verified pharmacy   [x]  Verified ordering Provider   [x]  Does patient have enough for the next 3 days? Yes [] No [x]  Does patient have a follow-up appointment scheduled? Yes [x] No []   If so when is appointment: 8/15/2023 2pm    Medication Refill Request     Name of Medication Atarax  Dose/Frequency 25mg take 1 tab up to 3 times daily  Quantity 90  Verified pharmacy   [x]  Verified ordering Provider   [x]  Does patient have enough for the next 3 days? Yes [x] No []  Does patient have a follow-up appointment scheduled?  Yes [x] No []   If so when is appointment: 8/15/2023 2pm

## 2023-05-12 NOTE — TELEPHONE ENCOUNTER
PDMP website reviewed  [place] has been appropriately adherent to controlled psychotropic medications without evidence of abuse or misuse  As such, will send 30-day refill to pharmacy of choice and follow up as necessary

## 2023-05-22 ENCOUNTER — TELEPHONE (OUTPATIENT)
Dept: PODIATRY | Facility: CLINIC | Age: 63
End: 2023-05-22

## 2023-05-22 DIAGNOSIS — M76.62 ACHILLES TENDINITIS OF LEFT LOWER EXTREMITY: Primary | ICD-10-CM

## 2023-05-22 RX ORDER — MELOXICAM 15 MG/1
15 TABLET ORAL DAILY
Qty: 90 TABLET | Refills: 0 | Status: SHIPPED | OUTPATIENT
Start: 2023-05-22 | End: 2023-08-20

## 2023-05-22 NOTE — TELEPHONE ENCOUNTER
Caller: Manuela Adjutant    Doctor: Mando Skinner    Reason for call: REFILL:  meloxicam 15mg 1 tab hs  Has appt 6/27    Call back#: [phone]

## 2023-06-13 ENCOUNTER — HOSPITAL ENCOUNTER (OUTPATIENT)
Dept: RADIOLOGY | Facility: HOSPITAL | Age: 63
Discharge: HOME | End: 2023-06-13
Payer: COMMERCIAL

## 2023-06-13 VITALS — WEIGHT: 170 LBS | BODY MASS INDEX: 31.28 KG/M2 | HEIGHT: 62 IN

## 2023-06-13 DIAGNOSIS — M85.80 OSTEOPENIA, UNSPECIFIED LOCATION: ICD-10-CM

## 2023-06-13 PROCEDURE — 77080 DXA BONE DENSITY AXIAL: CPT

## 2023-06-19 ENCOUNTER — TELEPHONE (OUTPATIENT)
Dept: INTERNAL MEDICINE CLINIC | Facility: CLINIC | Age: 63
End: 2023-06-19

## 2023-06-19 DIAGNOSIS — E03.9 ACQUIRED HYPOTHYROIDISM: ICD-10-CM

## 2023-06-19 RX ORDER — LEVOTHYROXINE SODIUM 0.1 MG/1
100 TABLET ORAL
Qty: 90 TABLET | Refills: 0 | Status: SHIPPED | OUTPATIENT
Start: 2023-06-19

## 2023-06-19 NOTE — TELEPHONE ENCOUNTER
Pt  Is going to have another colonoscopy this Weds  She is having a lot of rectal bleeding and saw Dr Justin Marie  She will have him send reports to us

## 2023-06-27 ENCOUNTER — OFFICE VISIT (OUTPATIENT)
Dept: PODIATRY | Facility: CLINIC | Age: 63
End: 2023-06-27
Payer: COMMERCIAL

## 2023-06-27 VITALS
WEIGHT: 177 LBS | BODY MASS INDEX: 32.57 KG/M2 | HEART RATE: 63 BPM | SYSTOLIC BLOOD PRESSURE: 138 MMHG | RESPIRATION RATE: 18 BRPM | HEIGHT: 62 IN | DIASTOLIC BLOOD PRESSURE: 98 MMHG

## 2023-06-27 DIAGNOSIS — M77.32 HEEL SPUR, LEFT: ICD-10-CM

## 2023-06-27 DIAGNOSIS — M76.62 ACHILLES TENDINITIS OF LEFT LOWER EXTREMITY: ICD-10-CM

## 2023-06-27 DIAGNOSIS — M20.41 HAMMER TOE OF RIGHT FOOT: ICD-10-CM

## 2023-06-27 DIAGNOSIS — L84 CORNS: Primary | ICD-10-CM

## 2023-06-27 PROCEDURE — 99212 OFFICE O/P EST SF 10 MIN: CPT | Performed by: PODIATRIST

## 2023-06-27 NOTE — PROGRESS NOTES
Patient presents for pedal assessment  At last visit, patient was diagnosed with Achilles tendinitis with retrocalcaneal spur affecting the left foot and a hammertoe deformity with corn affecting the right fifth toe  Patient was prescribed meloxicam   Patient states that it was very helpful in relieving her pain but she needed to discontinue as she developed a GI bleed  Patient has a history of ulcerative colitis  The corn on the right fifth toe has also returned and is very painful  Treatment: Trimmed hyperkeratotic lesion right fifth toe  Patient referred for physical therapy  Also recommended Voltaren gel  She will be reassessed in 6 weeks  Surgery is needed for correction but patient cannot take time from work

## 2023-07-05 DIAGNOSIS — F90.2 ADHD (ATTENTION DEFICIT HYPERACTIVITY DISORDER), COMBINED TYPE: ICD-10-CM

## 2023-07-05 DIAGNOSIS — G47.00 INSOMNIA: ICD-10-CM

## 2023-07-05 DIAGNOSIS — F41.1 GENERALIZED ANXIETY DISORDER WITH PANIC ATTACKS: ICD-10-CM

## 2023-07-05 DIAGNOSIS — F41.0 GENERALIZED ANXIETY DISORDER WITH PANIC ATTACKS: ICD-10-CM

## 2023-07-05 NOTE — TELEPHONE ENCOUNTER
Medication Refill Request     Name of Medication Trazodone  Dose/Frequency 100mg 1 daily at bedtime  Quantity 30 tablet  Verified pharmacy   [x]  Verified ordering Provider   [x]  Does patient have enough for the next 3 days? Yes [x] No []  Does patient have a follow-up appointment scheduled? Yes [x] No []   If so when is appointment: 8/15    Medication Refill Request     Name of Medication Atarax  Dose/Frequency 25mg 3 daily  Quantity 90 tablet  Verified pharmacy   [x]  Verified ordering Provider   [x]  Does patient have enough for the next 3 days? Yes [x] No []  Does patient have a follow-up appointment scheduled? Yes [x] No []   If so when is appointment: 8/15    Medication Refill Request     Name of Medication Adderall  Dose/Frequency 10mg 2 daily  Quantity 60 tablet  Verified pharmacy   [x]  Verified ordering Provider   [x]  Does patient have enough for the next 3 days? Yes [x] No []  Does patient have a follow-up appointment scheduled?  Yes [x] No []   If so when is appointment: 8/15

## 2023-07-06 RX ORDER — DEXTROAMPHETAMINE SACCHARATE, AMPHETAMINE ASPARTATE, DEXTROAMPHETAMINE SULFATE AND AMPHETAMINE SULFATE 2.5; 2.5; 2.5; 2.5 MG/1; MG/1; MG/1; MG/1
1 TABLET ORAL 2 TIMES DAILY
Qty: 60 TABLET | Refills: 0 | Status: SHIPPED | OUTPATIENT
Start: 2023-07-06 | End: 2023-08-05

## 2023-07-06 RX ORDER — TRAZODONE HYDROCHLORIDE 100 MG/1
100 TABLET ORAL
Qty: 30 TABLET | Refills: 1 | Status: SHIPPED | OUTPATIENT
Start: 2023-07-06

## 2023-07-06 RX ORDER — HYDROXYZINE HYDROCHLORIDE 25 MG/1
TABLET, FILM COATED ORAL
Qty: 90 TABLET | Refills: 3 | Status: SHIPPED | OUTPATIENT
Start: 2023-07-06

## 2023-07-06 NOTE — TELEPHONE ENCOUNTER
PDMP website reviewed. Spring Calderon has been appropriately adherent to controlled psychotropic medications without evidence of abuse or misuse. As such, will send 30-day refill to pharmacy of choice and follow up as necessary.

## 2023-08-01 PROBLEM — M76.60 ACHILLES TENDONITIS: Status: ACTIVE | Noted: 2023-08-01

## 2023-08-01 PROBLEM — M77.32 CALCANEAL SPUR, LEFT: Status: ACTIVE | Noted: 2023-08-01

## 2023-08-02 ENCOUNTER — TELEPHONE (OUTPATIENT)
Dept: ADMINISTRATIVE | Facility: OTHER | Age: 63
End: 2023-08-02

## 2023-08-02 NOTE — TELEPHONE ENCOUNTER
----- Message from Jamar Washburn sent at 8/2/2023  8:46 AM EDT -----  Regarding: Care Gap Request  04/12/23 3:38 PM    Hello, our patient attached above. has had CRC: Colonoscopy completed/performed. Please assist in updating the patient chart by pulling the document from the Media Tab. The date of service is 6/21/23.      Thank you,  Mackenzie Skinner  [facility]

## 2023-08-03 NOTE — TELEPHONE ENCOUNTER
Upon review of the In Basket request and the patient's chart, initial outreach has been made via fax to facility. Please see Contacts section for details. Only path results and consult letter found need colon op report.      Thank you  Rcahel Davis MA

## 2023-08-04 NOTE — TELEPHONE ENCOUNTER
Upon review of the In Basket request we were able to locate, review, and update the patient chart as requested for CRC: Colonoscopy. Any additional questions or concerns should be emailed to the Practice Liaisons via the appropriate education email address, please do not reply via In Basket.     Thank you  Cash Arredondo MA

## 2023-08-08 ENCOUNTER — OFFICE VISIT (OUTPATIENT)
Dept: PODIATRY | Facility: CLINIC | Age: 63
End: 2023-08-08
Payer: MEDICARE

## 2023-08-08 VITALS
WEIGHT: 177 LBS | HEART RATE: 65 BPM | HEIGHT: 62 IN | SYSTOLIC BLOOD PRESSURE: 140 MMHG | DIASTOLIC BLOOD PRESSURE: 75 MMHG | BODY MASS INDEX: 32.57 KG/M2 | RESPIRATION RATE: 18 BRPM

## 2023-08-08 DIAGNOSIS — L84 CORNS: Primary | ICD-10-CM

## 2023-08-08 DIAGNOSIS — M20.41 HAMMER TOE OF RIGHT FOOT: ICD-10-CM

## 2023-08-08 PROCEDURE — 99212 OFFICE O/P EST SF 10 MIN: CPT | Performed by: PODIATRIST

## 2023-08-08 NOTE — PROGRESS NOTES
Patient presents for assessment of right fifth toe. Patient was last seen 6 weeks ago. Hyperkeratotic lesion on the right fifth toe has recurred and again is very painful. Discussed treatment options for hammertoes. Conservative care involves periodic palliation. Also recommending cutting a hole in her running shoes that she wears at work. PIPJ arthroplasty needed for correction. The procedure was explained in detail including pre and postoperative course. Patient to discuss with her workplace. She will be rescheduled in 6 weeks.

## 2023-08-09 ENCOUNTER — OFFICE VISIT (OUTPATIENT)
Dept: INTERNAL MEDICINE CLINIC | Facility: CLINIC | Age: 63
End: 2023-08-09
Payer: COMMERCIAL

## 2023-08-09 VITALS
DIASTOLIC BLOOD PRESSURE: 84 MMHG | HEART RATE: 58 BPM | TEMPERATURE: 96.7 F | HEIGHT: 62 IN | BODY MASS INDEX: 31.28 KG/M2 | OXYGEN SATURATION: 98 % | WEIGHT: 170 LBS | SYSTOLIC BLOOD PRESSURE: 124 MMHG

## 2023-08-09 DIAGNOSIS — K51.819 OTHER ULCERATIVE COLITIS WITH COMPLICATION (HCC): ICD-10-CM

## 2023-08-09 DIAGNOSIS — Z72.0 TOBACCO USE: ICD-10-CM

## 2023-08-09 DIAGNOSIS — R53.83 OTHER FATIGUE: ICD-10-CM

## 2023-08-09 DIAGNOSIS — K59.04 CHRONIC IDIOPATHIC CONSTIPATION: ICD-10-CM

## 2023-08-09 DIAGNOSIS — Z00.00 WELCOME TO MEDICARE PREVENTIVE VISIT: ICD-10-CM

## 2023-08-09 DIAGNOSIS — F41.1 GENERALIZED ANXIETY DISORDER WITH PANIC ATTACKS: ICD-10-CM

## 2023-08-09 DIAGNOSIS — E03.9 ACQUIRED HYPOTHYROIDISM: ICD-10-CM

## 2023-08-09 DIAGNOSIS — F41.0 GENERALIZED ANXIETY DISORDER WITH PANIC ATTACKS: ICD-10-CM

## 2023-08-09 DIAGNOSIS — F33.1 MAJOR DEPRESSIVE DISORDER, RECURRENT EPISODE, MODERATE (HCC): ICD-10-CM

## 2023-08-09 DIAGNOSIS — M85.80 OSTEOPENIA, UNSPECIFIED LOCATION: ICD-10-CM

## 2023-08-09 DIAGNOSIS — R06.02 SOB (SHORTNESS OF BREATH): ICD-10-CM

## 2023-08-09 DIAGNOSIS — F51.04 PSYCHOPHYSIOLOGICAL INSOMNIA: ICD-10-CM

## 2023-08-09 DIAGNOSIS — E78.00 PURE HYPERCHOLESTEROLEMIA: Primary | ICD-10-CM

## 2023-08-09 DIAGNOSIS — E55.9 VITAMIN D DEFICIENCY: ICD-10-CM

## 2023-08-09 DIAGNOSIS — E66.9 OBESITY, CLASS II, BMI 35-39.9: ICD-10-CM

## 2023-08-09 DIAGNOSIS — K21.9 GASTROESOPHAGEAL REFLUX DISEASE WITHOUT ESOPHAGITIS: ICD-10-CM

## 2023-08-09 DIAGNOSIS — F90.2 ADHD (ATTENTION DEFICIT HYPERACTIVITY DISORDER), COMBINED TYPE: ICD-10-CM

## 2023-08-09 PROCEDURE — G0402 INITIAL PREVENTIVE EXAM: HCPCS | Performed by: INTERNAL MEDICINE

## 2023-08-09 PROCEDURE — 99214 OFFICE O/P EST MOD 30 MIN: CPT | Performed by: INTERNAL MEDICINE

## 2023-08-09 RX ORDER — NICOTINE 21 MG/24HR
1 PATCH, TRANSDERMAL 24 HOURS TRANSDERMAL EVERY 24 HOURS
Qty: 28 PATCH | Refills: 2 | Status: SHIPPED | OUTPATIENT
Start: 2023-08-09

## 2023-08-09 RX ORDER — MESALAMINE 1.2 G/1
TABLET, DELAYED RELEASE ORAL
COMMUNITY
Start: 2023-07-27

## 2023-08-09 NOTE — PROGRESS NOTES
Assessment and Plan:     Problem List Items Addressed This Visit        Digestive    Chronic idiopathic constipation     Stopped Linzess. Discussed high fiber diet, increase daily water intake. GERD (gastroesophageal reflux disease)     Recent swallowing symptoms. Recommend to see GI for possible EGD. Ulcerative colitis ([address])     S/p colonoscopy. Started on Lialda, did not start rectal mesalamine. Relevant Orders    CBC and differential    Vitamin B12       Endocrine    Acquired hypothyroidism     Repeat TSH due. Taking levothyroxine 100 mcg         Relevant Orders    CBC and differential       Musculoskeletal and Integument    Osteopenia     Continue daily walks and supplements. Other    ADHD (attention deficit hyperactivity disorder), combined type     On Adderall. Per psych. Relevant Medications    nicotine (NICODERM CQ) 21 mg/24 hr TD 24 hr patch    Generalized anxiety disorder with panic attacks     On Pristiq, hydroxyzine. Per psych. Relevant Medications    nicotine (NICODERM CQ) 21 mg/24 hr TD 24 hr patch    Insomnia     On trazodone. Major depressive disorder, recurrent episode, moderate ([address])     As above. Relevant Medications    nicotine (NICODERM CQ) 21 mg/24 hr TD 24 hr patch    Obesity, Class II, BMI 35-39.9     Weight loss of 7 lbs. Other fatigue     Schedule home sleep study. Relevant Orders    Home Study    Pure hypercholesterolemia - Primary     Taking atorvastatin 10 mg. Relevant Orders    Comprehensive metabolic panel    Lipid panel    Tobacco use     Stopped smoking cigarettes, now using vape. Recommend against vaping. Give nicotine patches to use. Relevant Medications    nicotine (NICODERM CQ) 21 mg/24 hr TD 24 hr patch    Vitamin D deficiency    Relevant Orders    Vitamin D 25 hydroxy   Other Visit Diagnoses     Welcome to Medicare preventive visit        Declined Prevnar today.     SOB (shortness of breath)        Resolved. Saw cardiology, normal stress test.           Preventive health issues were discussed with patient, and age appropriate screening tests were ordered as noted in patient's After Visit Summary. Personalized health advice and appropriate referrals for health education or preventive services given if needed, as noted in patient's After Visit Summary. History of Present Illness:     Patient presents for a Medicare Wellness Visit and follow up visit. She reports no more rectal bleeding. She is taking Lialda, did not want to use rectal medication. She stopped Linzess, having issues with constipation. She is taking Metamucil at least once a day. She feels that something is stuck in her throat. She is asking to have an ultrasound of her neck and thyroid again. She reports issues when swallowing meat. She admits that she does not chew her food thoroughly because of dental issues. Denies any nausea or vomiting. She had stopped smoking recently. She is now vaping. Denies any shortness of breath, cough or wheezing. She complains of feeling tired all the time. She complains of joint pains everywhere. She continues to work full-time. Patient Care Team:  Tia Holt MD as PCP - General  Tia Holt MD as PCP - [employer] ([facility])  Tia Holt MD as PCP - PCP-[employer] ([facility])     Review of Systems:     Review of Systems   Constitutional: Positive for fatigue. Negative for appetite change. HENT: Positive for trouble swallowing. Negative for congestion, ear pain, postnasal drip and voice change. Eyes: Negative for visual disturbance. Respiratory: Negative for cough, shortness of breath and wheezing. Cardiovascular: Negative for chest pain, palpitations and leg swelling. Gastrointestinal: Negative for abdominal pain, blood in stool, constipation and diarrhea.    Genitourinary: Negative for dysuria, frequency and urgency. Musculoskeletal: Positive for arthralgias. Negative for gait problem, joint swelling and myalgias. Skin: Negative for rash and wound. Neurological: Negative for dizziness and headaches. Hematological: Does not bruise/bleed easily. Psychiatric/Behavioral: Negative for confusion. The patient is not nervous/anxious.          Problem List:     Patient Active Problem List   Diagnosis   • ADHD (attention deficit hyperactivity disorder), combined type   • Generalized anxiety disorder with panic attacks   • Ulcerative colitis ([address])   • Mood disorder ([address])   • Osteopenia   • Vitamin D deficiency   • Acquired hypothyroidism   • Obesity, Class II, BMI 35-39.9   • Insomnia   • Pure hypercholesterolemia   • Tobacco use   • Major depressive disorder, recurrent episode, moderate (HCC)   • Colon polyp   • Mild dilation of ascending aorta (HCC)   • Other fatigue   • GERD (gastroesophageal reflux disease)   • Chronic idiopathic constipation   • Leg swelling   • Right wrist pain   • Achilles tendonitis   • Calcaneal spur, left      Past Medical and Surgical History:     Past Medical History:   Diagnosis Date   • Anxiety    • Anxiety    • Bunion    • Colon polyp    • Difficulty walking    • Panic attacks    • Thyroid disease    • Visual impairment      Past Surgical History:   Procedure Laterality Date   •  SECTION     • COLONOSCOPY     • HYSTERECTOMY     • TUBAL LIGATION  82      Family History:     Family History   Problem Relation Age of Onset   • Lung cancer Mother    • Cancer Mother    • Ovarian cancer Maternal Grandmother    • Glaucoma Paternal Grandmother    • Asthma Son    • Hypothyroidism Daughter    • Thyroid disease Daughter    • Substance Abuse Brother    • Alcohol abuse Neg Hx    • Depression Neg Hx    • Drug abuse Neg Hx    • Diabetes Neg Hx    • Hypertension Neg Hx    • Heart disease Neg Hx    • Stroke Neg Hx       Social History:     Social History     Socioeconomic History   • Marital status: /Civil Union     Spouse name: None   • Number of children: None   • Years of education: None   • Highest education level: None   Occupational History   • None   Tobacco Use   • Smoking status: Every Day     Packs/day: 0.50     Years: 45.00     Total pack years: 22.50     Types: Cigarettes     Start date: 1/4/1980   • Smokeless tobacco: Never   • Tobacco comments:     since age 23   Vaping Use   • Vaping Use: Never used   Substance and Sexual Activity   • Alcohol use: Yes     Alcohol/week: 1.0 standard drink of alcohol     Types: 1 Glasses of wine per week     Comment: Rare- 1 every few weeks    • Drug use: No     Types: Cocaine     Comment: coccaine use 1978 as per allscripts   • Sexual activity: Yes     Partners: Male     Birth control/protection: None   Other Topics Concern   • None   Social History Narrative        Has a son and a daughter in [facility] - 3 grandchildren    Working - at [employer] in a [email or web address] while  incarcerated almost 2 yrs        Drinks Coffee: 3-4 cups     Social Determinants of Health     Financial Resource Strain: Low Risk  (8/9/2023)    Overall Financial Resource Strain (CARDIA)    • Difficulty of Paying Living Expenses: Not very hard   Food Insecurity: Not on file   Transportation Needs: No Transportation Needs (8/9/2023)    PRAPARE - Transportation    • Lack of Transportation (Medical): No    • Lack of Transportation (Non-Medical):  No   Physical Activity: Not on file   Stress: Not on file   Social Connections: Not on file   Intimate Partner Violence: Not on file   Housing Stability: Not on file      Medications and Allergies:     Current Outpatient Medications   Medication Sig Dispense Refill   • nicotine (NICODERM CQ) 21 mg/24 hr TD 24 hr patch Place 1 patch on the skin over 24 hours every 24 hours 28 patch 2   • amphetamine-dextroamphetamine (ADDERALL) 10 mg tablet Take 1 tablet (10 mg total) by mouth 2 (two) times a day Max Daily Amount: 20 mg 60 tablet 0   • aspirin (ECOTRIN LOW STRENGTH) 81 mg EC tablet 81 mg     • atorvastatin (LIPITOR) 10 mg tablet Take 1 tablet (10 mg total) by mouth daily 90 tablet 1   • desvenlafaxine (PRISTIQ) 100 mg 24 hr tablet take 1 tablet by mouth once daily 30 tablet 4   • hydrOXYzine HCL (ATARAX) 25 mg tablet Take 1 tablet (25mg) up to three times daily as needed for anxiety and panic symptoms. 90 tablet 3   • levothyroxine 100 mcg tablet Take 1 tablet (100 mcg total) by mouth daily in the early morning 90 tablet 0   • mesalamine (LIALDA) 1.2 g EC tablet TAKE 4 TABLET BY MOUTH ONCE DAILY EVERY MORNING     • Multiple Vitamins-Minerals (multivitamin with minerals) tablet Take 1 tablet by mouth daily     • traZODone (DESYREL) 100 mg tablet Take 1 tablet (100 mg total) by mouth daily at bedtime 30 tablet 1     No current facility-administered medications for this visit. No Known Allergies   Immunizations:     Immunization History   Administered Date(s) Administered   • COVID-19 PFIZER VACCINE 0.3 ML IM 05/17/2021, 12/28/2021   • Influenza, recombinant, quadrivalent,injectable, preservative free 10/13/2020   • Tdap 01/01/2012      Health Maintenance:         Topic Date Due   • HIV Screening  Never done   • Lung Cancer Screening  Never done   • Breast Cancer Screening: Mammogram  08/28/2015   • Colorectal Cancer Screening  06/21/2026   • Hepatitis C Screening  Completed         Topic Date Due   • Pneumococcal Vaccine: 65+ Years (1 - PCV) Never done   • COVID-19 Vaccine (3 - Pfizer series) 02/22/2022   • Influenza Vaccine (1) 09/01/2023      Medicare Screening Tests and Risk Assessments:     Roddy Tabor is here for her Welcome to Medicare visit. Health Risk Assessment:   Patient rates overall health as good. Patient feels that their physical health rating is same. Patient is satisfied with their life. Eyesight was rated as slightly worse. Hearing was rated as same.  Patient feels that their emotional and mental health rating is same. Patients states they are never, rarely angry. Patient states they are never, rarely unusually tired/fatigued. Pain experienced in the last 7 days has been none. Patient states that she has experienced no weight loss or gain in last 6 months. Depression Screening:   PHQ-9 Score: 0      Fall Risk Screening: In the past year, patient has experienced: no history of falling in past year      Urinary Incontinence Screening:   Patient has leaked urine accidently in the last six months. Home Safety:  Patient does not have trouble with stairs inside or outside of their home. Patient has working smoke alarms and has working carbon monoxide detector. Home safety hazards include: none. Nutrition:   Current diet is Regular. Medications:   Patient is currently taking over-the-counter supplements. OTC medications include: see medication list. Patient is able to manage medications. Activities of Daily Living (ADLs)/Instrumental Activities of Daily Living (IADLs):   Walk and transfer into and out of bed and chair?: Yes  Dress and groom yourself?: Yes    Bathe or shower yourself?: Yes    Feed yourself?  Yes  Do your laundry/housekeeping?: Yes  Manage your money, pay your bills and track your expenses?: Yes  Make your own meals?: Yes    Do your own shopping?: Yes    Previous Hospitalizations:   Any hospitalizations or ED visits within the last 12 months?: No      Advance Care Planning:   Living will: No    Durable POA for healthcare: No    End of Life Decisions reviewed with patient: Yes      Cognitive Screening:   Provider or family/friend/caregiver concerned regarding cognition?: No    PREVENTIVE SCREENINGS      Cardiovascular Screening:    General: History Lipid Disorder    Due for: Lipid Panel      Diabetes Screening:       Due for: Blood Glucose      Colorectal Cancer Screening:     General: Screening Current      Breast Cancer Screening:       Due for: Mammogram        Cervical Cancer Screening: General: Screening Not Indicated      Osteoporosis Screening:    General: Screening Current      Abdominal Aortic Aneurysm (AAA) Screening:        General: Screening Not Indicated      Lung Cancer Screening:     General: Patient Declines      Hepatitis C Screening:    General: Screening Current    Screening, Brief Intervention, and Referral to Treatment (SBIRT)    Screening  Typical number of drinks in a day: 0  Typical number of drinks in a week: 0  Interpretation: Low risk drinking behavior. Single Item Drug Screening:  How often have you used an illegal drug (including marijuana) or a prescription medication for non-medical reasons in the past year? never    Single Item Drug Screen Score: 0  Interpretation: Negative screen for possible drug use disorder    Other Counseling Topics:   Regular weightbearing exercise and calcium and vitamin D intake. Vision Screening    Right eye Left eye Both eyes   Without correction 20/50 20/50 20/50   With correction 20/25 20/30 20/20        Physical Exam:     /84   Pulse 58   Temp (!) 96.7 °F (35.9 °C)   Ht 5' 2" (1.575 m)   Wt 77.1 kg (170 lb)   SpO2 98%   BMI 31.09 kg/m²     Physical Exam  Vitals and nursing note reviewed. Constitutional:       General: She is not in acute distress. Appearance: She is well-developed. HENT:      Head: Normocephalic and atraumatic. Mouth/Throat:      Mouth: Mucous membranes are moist.   Eyes:      Conjunctiva/sclera: Conjunctivae normal.   Neck:      Thyroid: No thyroid mass. Cardiovascular:      Rate and Rhythm: Normal rate and regular rhythm. Heart sounds: No murmur heard. Pulmonary:      Effort: Pulmonary effort is normal. No respiratory distress. Breath sounds: Normal breath sounds. Abdominal:      Palpations: Abdomen is soft. Tenderness: There is no abdominal tenderness. Musculoskeletal:         General: No swelling. Cervical back: Neck supple.    Lymphadenopathy:      Cervical: No cervical adenopathy. Skin:     General: Skin is warm and dry. Neurological:      General: No focal deficit present. Mental Status: She is alert and oriented to person, place, and time.    Psychiatric:         Mood and Affect: Mood normal.         Behavior: Behavior normal.          Augusto Alejandre MD

## 2023-08-15 ENCOUNTER — OFFICE VISIT (OUTPATIENT)
Dept: PSYCHIATRY | Facility: CLINIC | Age: 63
End: 2023-08-15
Payer: MEDICARE

## 2023-08-15 DIAGNOSIS — F41.0 GENERALIZED ANXIETY DISORDER WITH PANIC ATTACKS: Primary | ICD-10-CM

## 2023-08-15 DIAGNOSIS — G47.00 INSOMNIA: ICD-10-CM

## 2023-08-15 DIAGNOSIS — F41.1 GENERALIZED ANXIETY DISORDER WITH PANIC ATTACKS: Primary | ICD-10-CM

## 2023-08-15 DIAGNOSIS — F90.2 ADHD (ATTENTION DEFICIT HYPERACTIVITY DISORDER), COMBINED TYPE: ICD-10-CM

## 2023-08-15 DIAGNOSIS — F51.04 PSYCHOPHYSIOLOGICAL INSOMNIA: ICD-10-CM

## 2023-08-15 DIAGNOSIS — F33.1 MAJOR DEPRESSIVE DISORDER, RECURRENT EPISODE, MODERATE (HCC): ICD-10-CM

## 2023-08-15 PROCEDURE — 99214 OFFICE O/P EST MOD 30 MIN: CPT | Performed by: STUDENT IN AN ORGANIZED HEALTH CARE EDUCATION/TRAINING PROGRAM

## 2023-08-15 PROCEDURE — 90833 PSYTX W PT W E/M 30 MIN: CPT | Performed by: STUDENT IN AN ORGANIZED HEALTH CARE EDUCATION/TRAINING PROGRAM

## 2023-08-15 RX ORDER — ESCITALOPRAM OXALATE 10 MG/1
TABLET ORAL
Qty: 90 TABLET | Refills: 1 | Status: SHIPPED | OUTPATIENT
Start: 2023-08-15 | End: 2023-11-20

## 2023-08-15 RX ORDER — TRAZODONE HYDROCHLORIDE 100 MG/1
100 TABLET ORAL
Qty: 30 TABLET | Refills: 2 | Status: SHIPPED | OUTPATIENT
Start: 2023-08-15

## 2023-08-15 RX ORDER — DEXTROAMPHETAMINE SACCHARATE, AMPHETAMINE ASPARTATE, DEXTROAMPHETAMINE SULFATE AND AMPHETAMINE SULFATE 2.5; 2.5; 2.5; 2.5 MG/1; MG/1; MG/1; MG/1
1 TABLET ORAL 2 TIMES DAILY
Qty: 60 TABLET | Refills: 0 | Status: SHIPPED | OUTPATIENT
Start: 2023-08-15 | End: 2023-09-14

## 2023-08-15 NOTE — PSYCH
MEDICATION MANAGEMENT NOTE        [facility] [address] [facility]      Name and Date of Birth:  Huber Holly 70 y.o. 1960 MRN: [id number]    Date of Visit: August 15, 2023    Reason for Visit: Follow-up visit for medication management       SUBJECTIVE:    Huber Holly is a 70 y.o. female with past psychiatric history significant for major depressive disorder, generalized anxiety with panic attacks, ADHD (inattentive type), and insomnia who was personally seen and evaluated today at the [address] [place] [address] outpatient clinic for follow-up and medication management. Shiloh Dunlap presents as anxious yet pleasant and cooperative. Her thoughts are linear and organized. She completes assessment without difficulty. Shiloh Dunlap endorses compliance with psychotropic medication regimen consisting of PRN Hydroxyzine, Trazodone, and Adderall. She denies adverse medication side effects. Shiloh Dunlap has not been evaluated for 12+ months. Within the last 5 months, Shiloh Dunlap admits to slowly tapering herself off Pristiq, endorsing restlessness as the cause. Since that period, Shiloh Dunlap endorses uptick in anxiety. She states that she experiences daily worry and internal restlessness. She is more on-edge and tense. She finds that she is more thoughtful in her responses at work but more reactionary with family at home. She admits to being "short" and endorses poor frustration tolerance with family. We discussed both conscious vs unconscious motives and desires and ways this influences her behavior. Shiloh Dunlap endorses ongoing distress and anxiety related to her son, his MH, and her grandson's custody dozier. He is now splitting time 50/50. Supportive therapy provided. Acutely, Shiloh Dunlap reports appropriate control of her mood, although her anxiety remains problematic. FRANCO-7 score today was 8. Shiloh Dunlap is managing episodic panic symptoms with Hydroxyzine. She reports "2 full blown panic attacks" since our last visit. Her insomnia is well managed with Trazodone. Her appetite is stable. She denies SI/HI. She continues to find purpose and meaning in working and socialization with work peers. She denies anhedonia or crying spells. No current hopelessness. Her concentration, focus, and attentiveness is well managed with use of Adderall. PDMP website reviewed, no acute concerns for abuse or misuse. During today's examination, Kimi De La Fuente does not exhibit objective evidence of tiffany/hypomania or psychosis. Kimi De La Fuente is not currently irritable, grandiose, labile, or pathologically euphoric. Kimi De La Fuente is without perceptual disturbances, such as A/V hallucinations, paranoia, ideas of reference, or delusional beliefs. Kimi De La Fuente denies recent ETOH or illicit substance abuse. She offers no further concerns. Current Rating Scores:     None completed today.     Review Of Systems:      Constitutional fluctuating energy level and as noted in HPI   ENT negative   Cardiovascular negative   Respiratory negative   Gastrointestinal abdominal discomfort, nausea and diarrhea   Genitourinary negative   Musculoskeletal arthralgias and joint pain   Integumentary negative   Neurological negative   Endocrine negative   Other Symptoms none, all other systems are negative       Past Psychiatric History: (unchanged information from previous note copied and italicized) - Information that is bolded has been updated.      Inpatient psychiatric admissions: Denies  Prior outpatient psychiatric linkage: Previously linked with Dr. Myah Velásquez via [facility]  Past/current psychotherapy: Denies  History of suicidal attempts/gestures: Denies  History of violence/aggressive behaviors: Denies  Psychotropic medication trials: Seroquel (sleep - too sedating), Zoloft (minimal benefit, gave her nightmares), Adderall, Xanax, Trazodone, Pristiq, Hydroxyzine (now)  Substance abuse inpatient/outpatient rehabilitation: Denies     Substance Abuse History: (unchanged information from previous note copied and italicized) - Information that is bolded has been updated.      Denies a history of ETOH or illicit substance abuse. No prior DWIs or DUIs. No prior involvement with law enforcement secondary to substance use.     Social History: (unchanged information from previous note copied and italicized) - Information that is bolded has been updated.      Developmental: Denies a history of milestone/developmental delay. Denies a history of in-utero exposure to toxins/illicit substances.  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 Jeri Acosta has no history of arrests or violence with a deadly weapon.      Traumatic History: (unchanged information from previous note copied and italicized) - Information that is bolded has been updated.       Abuse:none is reported  Other Traumatic Events:  being incarcerated was difficult       Past Medical History:    Past Medical History:   Diagnosis Date   • Anxiety    • Anxiety    • Bunion    • Colon polyp    • Difficulty walking    • Panic attacks    • Thyroid disease    • Visual impairment         Past Surgical History:   Procedure Laterality Date   •  SECTION     • COLONOSCOPY     • HYSTERECTOMY     • TUBAL LIGATION  82     No Known Allergies    Substance Abuse History:    Social History     Substance and Sexual Activity   Alcohol Use Yes   • Alcohol/week: 1.0 standard drink of alcohol   • Types: 1 Glasses of wine per week    Comment: Rare- 1 every few weeks      Social History     Substance and Sexual Activity   Drug Use No   • Types: Cocaine Comment: coccaine use 1978 as per allscripts       Social History:    Social History     Socioeconomic History   • Marital status: /Civil Union     Spouse name: Not on file   • Number of children: Not on file   • Years of education: Not on file   • Highest education level: Not on file   Occupational History   • Not on file   Tobacco Use   • Smoking status: Every Day     Packs/day: 0.50     Years: 45.00     Total pack years: 22.50     Types: Cigarettes     Start date: 1/4/1980   • Smokeless tobacco: Never   • Tobacco comments:     since age 23   Vaping Use   • Vaping Use: Never used   Substance and Sexual Activity   • Alcohol use: Yes     Alcohol/week: 1.0 standard drink of alcohol     Types: 1 Glasses of wine per week     Comment: Rare- 1 every few weeks    • Drug use: No     Types: Cocaine     Comment: coccaine use 1978 as per allscripts   • Sexual activity: Yes     Partners: Male     Birth control/protection: None   Other Topics Concern   • Not on file   Social History Narrative        Has a son and a daughter in [facility] - 3 grandchildren    Working - at [employer] in a [employer] while  incarcerated almost 2 yrs        Drinks Coffee: 3-4 cups     Social Determinants of Health     Financial Resource Strain: Low Risk  (8/9/2023)    Overall Financial Resource Strain (CARDIA)    • Difficulty of Paying Living Expenses: Not very hard   Food Insecurity: Not on file   Transportation Needs: No Transportation Needs (8/9/2023)    PRAPARE - Transportation    • Lack of Transportation (Medical): No    • Lack of Transportation (Non-Medical):  No   Physical Activity: Not on file   Stress: Not on file   Social Connections: Not on file   Intimate Partner Violence: Not on file   Housing Stability: Not on file       Family Psychiatric History:     Family History   Problem Relation Age of Onset   • Lung cancer Mother    • Cancer Mother    • Ovarian cancer Maternal Grandmother    • Glaucoma Paternal Grandmother • Asthma Son    • Hypothyroidism Daughter    • Thyroid disease Daughter    • Substance Abuse Brother    • Alcohol abuse Neg Hx    • Depression Neg Hx    • Drug abuse Neg Hx    • Diabetes Neg Hx    • Hypertension Neg Hx    • Heart disease Neg Hx    • Stroke Neg Hx        History Review: The following portions of the patient's history were reviewed and updated as appropriate: allergies, current medications, past family history, past medical history, past social history, past surgical history and problem list.         OBJECTIVE:     Vital signs in last 24 hours: There were no vitals filed for this visit. Mental Status Evaluation:    Appearance age appropriate, casually dressed, dressed appropriately, looks stated age   Behavior pleasant, cooperative, appears anxious   Speech normal rate, normal volume, normal pitch   Mood anxious   Affect constricted   Thought Processes organized, coherent, goal directed   Associations intact associations   Thought Content no overt delusions   Perceptual Disturbances: no auditory hallucinations, no visual hallucinations   Abnormal Thoughts  Risk Potential Suicidal ideation - None at present  Homicidal ideation - None at present  Potential for aggression - No   Orientation oriented to person, place, time/date and situation   Memory recent and remote memory grossly intact   Consciousness alert and awake   Attention Span Concentration Span attention span and concentration are age appropriate   Intellect appears to be of average intelligence   Insight intact and good   Judgement intact and good   Muscle Strength and  Gait normal gait and normal balance   Motor activity no abnormal movements   Language no difficulty naming common objects   Fund of Knowledge adequate knowledge of current events   Pain none   Pain Scale Did not ask patient to formally rate       Laboratory Results: I have personally reviewed all pertinent laboratory/tests results    Recent Labs (last 2 months):    No visits with results within 2 Month(s) from this visit. Latest known visit with results is:   Appointment on 03/07/2023   Component Date Value   • TSH 3RD GENERATON 03/07/2023 9.434 (H)    • Free T4 03/07/2023 1.12        Suicide/Homicide Risk Assessment:    The following interventions are recommended: contracts for safety at present - agrees to go to ED if feeling unsafe      Lethality Statement:    Based on today's assessment and clinical criteria, Uziel Mann contracts for safety and is not an imminent risk of harm to self or others. Outpatient level of care is deemed appropriate at this current time. Jessie Jones understands that if they can no longer contract for safety, they need to call the office or report to their nearest Emergency Room for immediate evaluation. Assessment/Plan:     Elziabeth Hernandez a 71 y. o. female with past psychiatric history significant for major depressive disorder, generalized anxiety with panic attacks, ADHD (inattentive type), and insomnia who was personally seen and evaluated today at the [address] [place] [address] outpatient clinic for follow-up and medication management. Jeri endorses worsening anxiety, "panic attacks", and intermittent depressive symptomatology over the past 10 years in the context of numerous psychosocial stressors including financial instability, familial discord, and homelessness. Jessie Jones states that her life was "normal" until her  was arrested and incarcerated. As such, she has been increasingly more depressed and notices a profound increase in debilitating anxiety and subsequent panic attacks. Aggravating factors include familial stress, poor finances, and "being out in society". Alleviating factors include mindfulness and self-help books about guided imagery. Her PCP prescribed Xanax 1mg PO BID with limited benefit. In addition to anxiousness, Jessie Jones endorses several neurovegetative symptoms of depression.  Her sleep is poor and her appetite is limited. She endorses a 30lb weight gain over the last year without significant dietary intake. Her energy is "nonexhistent" and her ability to concentrate is quite limited. Her PCP prescribed Adderall 5mg PO BID given her history of ADHD with fair benefit. Sandy Grijalva endorses anhedonia, limited interaction with grandchildren, and episodic hopelessness and guilt. In the past, Sandy Grijalva was treated by Dr. Tish Weiss with Zoloft which was optimized to 200mg. She endorsed limited improvement in mood and severe nightmares so this agent was discontinued. Jeri denies acute or chronic symptomatology suggestive of tiffany or hypomania and she denies perceptual disturbances such as A/V hallucinations, paranoia, ideas of reference or delusional thoughts. She denies recent ETOH or illicit substance abuse but does smoke cigarretes.     Today's Plan:     Psychopharmacologically, Jeri reports tolerability with Adderall, Trazodone, and Hydroxyzine. She denies current need for medication changes in that regard. She does endorse worsening anxiety that is impairing functionality. As such, will start lexapro, given it's tolerability profile. Psychoeducation provided regarding indications for Lexapro, benefits (including delayed maximal benefits up to 4-6 weeks after initiation/dose changes), risks (including the rare but serious risks of suicidal ideation, serotonin syndrome, & medication-induced tiffany/hypomania), side effects, and alternative options provided to Sandy Grijalva, and the importance of the compliance with psychiatric treatment reiterated. Sandy Grijalva verbalized understanding and agreed to the proposed regimen.  Sandy Grijalva consented for safety and agreed to call 911/hotline or to go to the nearest ED in case of crisis or safety concerns.        DSM-V Diagnoses:      1.) Generalized anxiety disorder with panic attacks  2.) Major Depressive Disorder, recurrent episode, moderate  3.) ADHD, adult type  4.) Insomnia        Treatment Recommendations/Precautions:     1.) Generalized anxiety disorder with panic attacks   - Hx of Zoloft trial with limited benefit  - Start Lexapro 5mg Daily for 1 week - optimize to 10mg Daily after 1 week  - Continue PRN Hydroxyzine 25-50mg BID PRN for breakthrough anxiety  - Psychoeducation provided regarding the importance of exercise and healthy dietary choices and their impact on mood, energy, and motivation  - Counseled to avoid ETOH, illict substances, and nicotine secondary to the detrimental effects of these substances on mental and physical health  - Encouraged to engage in non-verbal forms of therapy such as art therapy, music therapy, and mindfulness     2.) Major Depressive Disorder, recurrent episode, moderate  - Hx of Zoloft trial with limited benefit  - Start Lexapro 5mg Daily for 1 week - optimize to 10mg Daily after 1 week     3.) ADHD, adult type  - Originally prescribed by PCP - Adderall 5mg PO BID  - Continue Adderall IR 10mg BID to limit difficulties with sleep initiation and to assist with early AM focus  - Monitor for adverse medication reactions      4.) Insomnia  - Currently seeing sleep medicine  - Sleep Study for [facility] on 4/14/2021 - missed appointment volitionally   - Continue Trazodone 250mg QHS to assist with sleep and mood       Medication management every 3 months  Does not want to see a therapist despite recommendation  Aware of need to follow up with family physician for medical issues  Aware of 24 hour and weekend coverage for urgent situations accessed by calling [facility] main practice number    Medications Risks/Benefits      Risks, Benefits And Possible Side Effects Of Medications:    Risks, benefits, and possible side effects of medications explained to Keren Degroot including risk of suicidality and serotonin syndrome related to treatment with antidepressants and risks of cardiovascular side effects including elevated blood pressure, risk of misuse, abuse or dependence and risk of increased anxiety related to treatment with stimulant medications. She verbalizes understanding and agreement for treatment. Controlled Medication Discussion:     Brook Duke has been filling controlled prescriptions on time as prescribed according to [address] [place] [address] Monitoring Program    Psychotherapy Provided:     Individual psychotherapy provided: Yes  Counseling was provided during the session today for 17 minutes. Medications, treatment progress and treatment plan reviewed with Brook Duke. Medication changes discussed with Brook Duke. Medication education provided to Brook Duke. Recent stressors discussed with Brook Duke including family problems, family conflict, family issues, marital problems, job stress, problems at work, health issues, medical problems, recent medication change, housing issues, social difficulties, everyday stressors and ongoing anxiety. Coping strategies reviewed with Brook Duke. Educated on importance of medication and treatment compliance. Importance of follow up with family physician for medical issues reviewed with Brook Duke. Supportive therapy provided. Cognitive therapy was utilized during the session. Treatment Plan:    Completed and signed during the session: Yes - with Brook Duke      Visit Time    Visit Start Time: 1:58 PM   Visit Stop Time: 2:30 PM  Total Visit Duration: 32 minutes     The total visit duration detailed above includes: patient engagement, medication management, psychotherapy/counseling, discussion regarding treatment goals, and coordination of care. Note Share Disclaimer:      This note was not shared with the patient due to reasonable likelihood of causing patient harm      Liliana Hoffman MD  Board Certified Diplomate of the [address] [place] [address] of Psychiatry and Neurology  08/15/23

## 2023-08-15 NOTE — BH TREATMENT PLAN
TREATMENT PLAN (Medication Management Only)        [address] [facility]    Name and Date of Birth:  Juan Perez 70 y.o. 1960  Date of Treatment Plan: August 15, 2023  Diagnosis/Diagnoses:    1. Major depressive disorder, recurrent episode, moderate ([address])    2. Generalized anxiety disorder with panic attacks    3. ADHD (attention deficit hyperactivity disorder), combined type      Strengths/Personal Resources for Self-Care: supportive family, supportive friends, taking medications as prescribed, ability to adapt to life changes, ability to communicate needs, ability to communicate well, motivation for treatment, ability to negotiate basic needs, being resoureceful, self-reliance, special hobby/interest, stable employment. Area/Areas of need (in own words): anxiety symptoms, depressive symptoms  1. Long Term Goal: improve control of anxiety. Target Date:6 months - 2/15/2024  Person/Persons responsible for completion of goal: Qi Sandoval  2. Short Term Objective (s) - How will we reach this goal?:   A. Provider new recommended medication/dosage changes and/or continue medication(s): continue current medications as prescribed. B. Attend medication management appointments regularly. C. Take psychiatric medications responsibly. D. Go to work   E. Take care of my grandson   Target Date:6 months - 2/15/2024  Person/Persons Responsible for Completion of Goal: Qi Sandoval  Progress Towards Goals: continuing treatment  Treatment Modality: medication management every 3 months  Review due 180 days from date of this plan: 6 months - 2/15/2024  Expected length of service: ongoing treatment  My Physician/PA/NP and I have developed this plan together and I agree to work on the goals and objectives. I understand the treatment goals that were developed for my treatment. Treatment Plan was completed with Jeri's assistance and input throughout today's session.  Qi Sandoval consented to the information provided and documented above. Unfortunately, treatment plan was not signed at the time of this office visit secondary to issues related to signature keypad.

## 2023-08-18 ENCOUNTER — TELEPHONE (OUTPATIENT)
Dept: PSYCHIATRY | Facility: CLINIC | Age: 63
End: 2023-08-18

## 2023-08-18 NOTE — TELEPHONE ENCOUNTER
Writer left a message to schedule follow up with provider. Office number was left and patient was asked to call back.

## 2023-08-21 ENCOUNTER — APPOINTMENT (OUTPATIENT)
Dept: LAB | Facility: CLINIC | Age: 63
End: 2023-08-21
Payer: MEDICARE

## 2023-08-21 DIAGNOSIS — E03.9 ACQUIRED HYPOTHYROIDISM: ICD-10-CM

## 2023-08-21 LAB
25(OH)D3 SERPL-MCNC: 31.5 NG/ML (ref 30–100)
ALBUMIN SERPL BCP-MCNC: 4.2 G/DL (ref 3.5–5)
ALP SERPL-CCNC: 57 U/L (ref 34–104)
ALT SERPL W P-5'-P-CCNC: 15 U/L (ref 7–52)
ANION GAP SERPL CALCULATED.3IONS-SCNC: 6 MMOL/L
AST SERPL W P-5'-P-CCNC: 17 U/L (ref 13–39)
BASOPHILS # BLD AUTO: 0.03 THOUSANDS/ÂΜL (ref 0–0.1)
BASOPHILS NFR BLD AUTO: 1 % (ref 0–1)
BILIRUB SERPL-MCNC: 0.31 MG/DL (ref 0.2–1)
BUN SERPL-MCNC: 18 MG/DL (ref 5–25)
CALCIUM SERPL-MCNC: 8.9 MG/DL (ref 8.4–10.2)
CHLORIDE SERPL-SCNC: 107 MMOL/L (ref 96–108)
CHOLEST SERPL-MCNC: 143 MG/DL
CO2 SERPL-SCNC: 27 MMOL/L (ref 21–32)
CREAT SERPL-MCNC: 0.68 MG/DL (ref 0.6–1.3)
EOSINOPHIL # BLD AUTO: 0.16 THOUSAND/ÂΜL (ref 0–0.61)
EOSINOPHIL NFR BLD AUTO: 3 % (ref 0–6)
ERYTHROCYTE [DISTWIDTH] IN BLOOD BY AUTOMATED COUNT: 15.1 % (ref 11.6–15.1)
GFR SERPL CREATININE-BSD FRML MDRD: 92 ML/MIN/1.73SQ M
GLUCOSE P FAST SERPL-MCNC: 70 MG/DL (ref 65–99)
HCT VFR BLD AUTO: 40.5 % (ref 34.8–46.1)
HDLC SERPL-MCNC: 65 MG/DL
HGB BLD-MCNC: 12.9 G/DL (ref 11.5–15.4)
IMM GRANULOCYTES # BLD AUTO: 0.01 THOUSAND/UL (ref 0–0.2)
IMM GRANULOCYTES NFR BLD AUTO: 0 % (ref 0–2)
LDLC SERPL CALC-MCNC: 71 MG/DL (ref 0–100)
LYMPHOCYTES # BLD AUTO: 2.42 THOUSANDS/ÂΜL (ref 0.6–4.47)
LYMPHOCYTES NFR BLD AUTO: 41 % (ref 14–44)
MCH RBC QN AUTO: 27.1 PG (ref 26.8–34.3)
MCHC RBC AUTO-ENTMCNC: 31.9 G/DL (ref 31.4–37.4)
MCV RBC AUTO: 85 FL (ref 82–98)
MONOCYTES # BLD AUTO: 0.41 THOUSAND/ÂΜL (ref 0.17–1.22)
MONOCYTES NFR BLD AUTO: 7 % (ref 4–12)
NEUTROPHILS # BLD AUTO: 2.84 THOUSANDS/ÂΜL (ref 1.85–7.62)
NEUTS SEG NFR BLD AUTO: 48 % (ref 43–75)
NONHDLC SERPL-MCNC: 78 MG/DL
NRBC BLD AUTO-RTO: 0 /100 WBCS
PLATELET # BLD AUTO: 262 THOUSANDS/UL (ref 149–390)
PMV BLD AUTO: 9.3 FL (ref 8.9–12.7)
POTASSIUM SERPL-SCNC: 4.5 MMOL/L (ref 3.5–5.3)
PROT SERPL-MCNC: 6.7 G/DL (ref 6.4–8.4)
RBC # BLD AUTO: 4.76 MILLION/UL (ref 3.81–5.12)
SODIUM SERPL-SCNC: 140 MMOL/L (ref 135–147)
TRIGL SERPL-MCNC: 36 MG/DL
TSH SERPL DL<=0.05 MIU/L-ACNC: 3.95 UIU/ML (ref 0.45–4.5)
VIT B12 SERPL-MCNC: 431 PG/ML (ref 180–914)
WBC # BLD AUTO: 5.87 THOUSAND/UL (ref 4.31–10.16)

## 2023-08-21 PROCEDURE — 84443 ASSAY THYROID STIM HORMONE: CPT

## 2023-08-22 ENCOUNTER — TELEPHONE (OUTPATIENT)
Dept: INTERNAL MEDICINE CLINIC | Facility: CLINIC | Age: 63
End: 2023-08-22

## 2023-08-22 NOTE — TELEPHONE ENCOUNTER
Lab results:    Thyroid test now within normal. No medication change. Your cholesterol has improved, continue taking atorvastatin. Please make sure you are taking vitamin B12 500 mcg daily. Continue taking your vitamin D3 daily.     The rest of your labs were normal.

## 2023-08-29 ENCOUNTER — HOSPITAL ENCOUNTER (OUTPATIENT)
Dept: RADIOLOGY | Facility: HOSPITAL | Age: 63
Discharge: HOME | End: 2023-08-29
Payer: MEDICARE

## 2023-08-29 VITALS — HEIGHT: 62 IN | WEIGHT: 170 LBS | BODY MASS INDEX: 31.28 KG/M2

## 2023-08-29 DIAGNOSIS — Z12.31 ENCOUNTER FOR SCREENING MAMMOGRAM FOR BREAST CANCER: ICD-10-CM

## 2023-08-29 DIAGNOSIS — E78.00 PURE HYPERCHOLESTEROLEMIA: ICD-10-CM

## 2023-08-29 PROCEDURE — 77063 BREAST TOMOSYNTHESIS BI: CPT

## 2023-08-29 PROCEDURE — 77067 SCR MAMMO BI INCL CAD: CPT

## 2023-08-29 RX ORDER — ATORVASTATIN CALCIUM 10 MG/1
10 TABLET, FILM COATED ORAL DAILY
Qty: 90 TABLET | Refills: 0 | Status: SHIPPED | OUTPATIENT
Start: 2023-08-29

## 2023-09-25 DIAGNOSIS — F41.0 GENERALIZED ANXIETY DISORDER WITH PANIC ATTACKS: ICD-10-CM

## 2023-09-25 DIAGNOSIS — F41.1 GENERALIZED ANXIETY DISORDER WITH PANIC ATTACKS: ICD-10-CM

## 2023-09-25 DIAGNOSIS — E03.9 ACQUIRED HYPOTHYROIDISM: ICD-10-CM

## 2023-09-25 DIAGNOSIS — F90.2 ADHD (ATTENTION DEFICIT HYPERACTIVITY DISORDER), COMBINED TYPE: ICD-10-CM

## 2023-09-25 RX ORDER — DEXTROAMPHETAMINE SACCHARATE, AMPHETAMINE ASPARTATE, DEXTROAMPHETAMINE SULFATE AND AMPHETAMINE SULFATE 2.5; 2.5; 2.5; 2.5 MG/1; MG/1; MG/1; MG/1
1 TABLET ORAL 2 TIMES DAILY
Qty: 60 TABLET | Refills: 0 | Status: SHIPPED | OUTPATIENT
Start: 2023-09-25 | End: 2023-10-25

## 2023-09-25 RX ORDER — LEVOTHYROXINE SODIUM 0.1 MG/1
100 TABLET ORAL
Qty: 90 TABLET | Refills: 0 | Status: SHIPPED | OUTPATIENT
Start: 2023-09-25

## 2023-09-25 RX ORDER — HYDROXYZINE HYDROCHLORIDE 25 MG/1
TABLET, FILM COATED ORAL
Qty: 90 TABLET | Refills: 3 | Status: SHIPPED | OUTPATIENT
Start: 2023-09-25

## 2023-09-25 NOTE — TELEPHONE ENCOUNTER
PDMP website reviewed. Nelsy Varela has been appropriately adherent to controlled psychotropic medications without evidence of abuse or misuse. As such, will send 30-day refill to pharmacy of choice and follow up as necessary.

## 2023-09-25 NOTE — TELEPHONE ENCOUNTER
Medication Refill Request     Name of Medication ADDERALL   Dose/Frequency 10 mg/ Take 1 tablet by mouth 2 times a day. Quantity 60  Verified pharmacy   [x]  Verified ordering Provider   [x]  Does patient have enough for the next 3 days? Yes [] No [x]  Does patient have a follow-up appointment scheduled? Yes [] No [x]   If so when is appointment: Pt needs appt.  Writer left  for pt to call back to schedule follow up

## 2023-09-25 NOTE — TELEPHONE ENCOUNTER
Medication Refill Request     Name of Medication Hydroxyzine   Dose/Frequency 25mg up to three times a day   Quantity 90 tablet  Verified pharmacy   [x]  Verified ordering Provider   [x]  Does patient have enough for the next 3 days? Yes [x] No []  Does patient have a follow-up appointment scheduled?  Yes [x] No []   If so when is appointment: 1/30/24

## 2023-11-14 ENCOUNTER — TELEPHONE (OUTPATIENT)
Dept: PODIATRY | Facility: CLINIC | Age: 63
End: 2023-11-14

## 2023-11-14 ENCOUNTER — OFFICE VISIT (OUTPATIENT)
Dept: PODIATRY | Facility: CLINIC | Age: 63
End: 2023-11-14
Payer: MEDICARE

## 2023-11-14 VITALS
BODY MASS INDEX: 31.09 KG/M2 | HEART RATE: 76 BPM | DIASTOLIC BLOOD PRESSURE: 64 MMHG | SYSTOLIC BLOOD PRESSURE: 92 MMHG | RESPIRATION RATE: 18 BRPM | HEIGHT: 62 IN

## 2023-11-14 DIAGNOSIS — M76.62 ACHILLES TENDINITIS OF LEFT LOWER EXTREMITY: ICD-10-CM

## 2023-11-14 DIAGNOSIS — L84 CORNS: Primary | ICD-10-CM

## 2023-11-14 DIAGNOSIS — M20.41 HAMMER TOE OF RIGHT FOOT: ICD-10-CM

## 2023-11-14 DIAGNOSIS — M77.32 HEEL SPUR, LEFT: ICD-10-CM

## 2023-11-14 PROCEDURE — 99212 OFFICE O/P EST SF 10 MIN: CPT | Performed by: PODIATRIST

## 2023-11-14 NOTE — PROGRESS NOTES
Patient presents with recurrence of a painful corn on the right fifth toe due to a hammertoe deformity. Palliative care provides relief but the lesion always recur. Patient also has recurrence of left heel pain secondary to Achilles tendinitis with retrocalcaneal spur. Patient had relief with an NSAID but it caused GI upset. Treatment: Trimmed hyperkeratotic lesion right fifth toe. Patient does not feel ready for an arthroplasty. She will be rescheduled for 2 months. Advised patient to utilize Voltaren gel for the left Achilles pain. Also advised her to stretch and use a shoe with a heel. Reappoint 2 months.

## 2023-11-14 NOTE — TELEPHONE ENCOUNTER
Patient insurance is coming up as Medicare part A only, patient states she has A and B. Aware patient to call and see if there's any info missing or misspelled, GIDEEN is coming up as E-Rejected. Gave patient billing number and told her to call ins to see what is going on.

## 2023-11-22 DIAGNOSIS — E78.00 PURE HYPERCHOLESTEROLEMIA: ICD-10-CM

## 2023-11-22 RX ORDER — ATORVASTATIN CALCIUM 10 MG/1
10 TABLET, FILM COATED ORAL DAILY
Qty: 90 TABLET | Refills: 1 | Status: SHIPPED | OUTPATIENT
Start: 2023-11-22

## 2023-12-07 DIAGNOSIS — G47.00 INSOMNIA: ICD-10-CM

## 2023-12-07 RX ORDER — TRAZODONE HYDROCHLORIDE 100 MG/1
TABLET ORAL
Qty: 30 TABLET | Refills: 2 | Status: SHIPPED | OUTPATIENT
Start: 2023-12-07

## 2023-12-11 DIAGNOSIS — F41.1 GENERALIZED ANXIETY DISORDER WITH PANIC ATTACKS: ICD-10-CM

## 2023-12-11 DIAGNOSIS — F90.2 ADHD (ATTENTION DEFICIT HYPERACTIVITY DISORDER), COMBINED TYPE: ICD-10-CM

## 2023-12-11 DIAGNOSIS — F33.1 MAJOR DEPRESSIVE DISORDER, RECURRENT EPISODE, MODERATE (HCC): ICD-10-CM

## 2023-12-11 DIAGNOSIS — F41.0 GENERALIZED ANXIETY DISORDER WITH PANIC ATTACKS: ICD-10-CM

## 2023-12-11 DIAGNOSIS — G47.00 INSOMNIA: ICD-10-CM

## 2023-12-11 RX ORDER — HYDROXYZINE HYDROCHLORIDE 25 MG/1
TABLET, FILM COATED ORAL
Qty: 90 TABLET | Refills: 3 | Status: SHIPPED | OUTPATIENT
Start: 2023-12-11

## 2023-12-11 RX ORDER — TRAZODONE HYDROCHLORIDE 100 MG/1
100 TABLET ORAL
Qty: 30 TABLET | Refills: 2 | Status: SHIPPED | OUTPATIENT
Start: 2023-12-11

## 2023-12-11 RX ORDER — DEXTROAMPHETAMINE SACCHARATE, AMPHETAMINE ASPARTATE, DEXTROAMPHETAMINE SULFATE AND AMPHETAMINE SULFATE 2.5; 2.5; 2.5; 2.5 MG/1; MG/1; MG/1; MG/1
1 TABLET ORAL 2 TIMES DAILY
Qty: 60 TABLET | Refills: 0 | Status: SHIPPED | OUTPATIENT
Start: 2023-12-11 | End: 2024-01-10

## 2023-12-11 RX ORDER — ESCITALOPRAM OXALATE 10 MG/1
TABLET ORAL
Qty: 90 TABLET | Refills: 1 | Status: SHIPPED | OUTPATIENT
Start: 2023-12-11 | End: 2024-03-16

## 2023-12-11 NOTE — TELEPHONE ENCOUNTER
Medication Refill Request     Name of Medication Adderall  Dose/Frequency 10 mg/ Take 1 tablet by mouth 2 times a day. Quantity 60  Verified pharmacy   [x]  Verified ordering Provider   [x]  Does patient have enough for the next 3 days? Yes [] No [x]  Does patient have a follow-up appointment scheduled? Yes [x] No []   If so when is appointment: 1/30/2024    Medication Refill Request     Name of Medication Lexapro  Dose/Frequency 10 mg/ Starting Tue 8/15/2023, Until Mon 8/21/2023 at 2359, THEN Starting Tue 8/22/2023, Until Sun 11/19/2023 at 2359 Take 0.5 tablets (5 mg total) by mouth daily for 7 days, THEN 1 tablet (10 mg total) daily. , Normal  Dose, Route, Frequency: 5 mg, Daily Starting Tue 8/15/2023, For 7 days, THEN 10 mg, Daily Starting Tue 8/22/2023, For 90 days   Quantity 90  Verified pharmacy   [x]  Verified ordering Provider   [x]  Does patient have enough for the next 3 days? Yes [] No [x]  Does patient have a follow-up appointment scheduled? Yes [x] No []   If so when is appointment: 1/30/2024      Medication Refill Request     Name of Medication Hydroxyzine  Dose/Frequency 25 mg/ Take 1 tablet 3 times daily as needed for anxiety and panic symptoms  Quantity 90  Verified pharmacy   [x]  Verified ordering Provider   [x]  Does patient have enough for the next 3 days? Yes [] No [x]  Does patient have a follow-up appointment scheduled? Yes [x] No []   If so when is appointment: 1/30/2024    Medication Refill Request     Name of Medication Trazodone  Dose/Frequency 100 mg/ Take 1 tablet by mouth daily at bedtime  Quantity 30  Verified pharmacy   [x]  Verified ordering Provider   [x]  Does patient have enough for the next 3 days? Yes [] No [x]  Does patient have a follow-up appointment scheduled?  Yes [x] No []   If so when is appointment: 1/30/2024

## 2023-12-11 NOTE — TELEPHONE ENCOUNTER
PDMP website reviewed. Nabil Freire has been appropriately adherent to controlled psychotropic medications without evidence of abuse or misuse. As such, will send 30-day refill to pharmacy of choice and follow up as necessary.

## 2024-01-17 DIAGNOSIS — E03.9 ACQUIRED HYPOTHYROIDISM: ICD-10-CM

## 2024-01-17 RX ORDER — LEVOTHYROXINE SODIUM 0.1 MG/1
100 TABLET ORAL
Qty: 90 TABLET | Refills: 0 | Status: SHIPPED | OUTPATIENT
Start: 2024-01-17

## 2024-01-22 DIAGNOSIS — F90.2 ADHD (ATTENTION DEFICIT HYPERACTIVITY DISORDER), COMBINED TYPE: ICD-10-CM

## 2024-01-22 RX ORDER — DEXTROAMPHETAMINE SACCHARATE, AMPHETAMINE ASPARTATE, DEXTROAMPHETAMINE SULFATE AND AMPHETAMINE SULFATE 2.5; 2.5; 2.5; 2.5 MG/1; MG/1; MG/1; MG/1
1 TABLET ORAL 2 TIMES DAILY
Qty: 60 TABLET | Refills: 0 | Status: SHIPPED | OUTPATIENT
Start: 2024-01-22 | End: 2024-02-21

## 2024-01-22 NOTE — TELEPHONE ENCOUNTER
PDMP website reviewed. Jeri has been appropriately adherent to controlled psychotropic medications without evidence of abuse or misuse. As such, will send 30-day refill to pharmacy of choice and follow up as necessary.

## 2024-01-22 NOTE — TELEPHONE ENCOUNTER
Medication Refill Request     Name of Medication adderall  Dose/Frequency 10,g take 1 tablet by mouth 2 times a day  Quantity 60  Verified pharmacy   [x]  Verified ordering Provider   [x]  Does patient have enough for the next 3 days? Yes [] No [x]  Does patient have a follow-up appointment scheduled? Yes [x] No []   If so when is appointment: 1/30/2024 9am

## 2024-01-29 ENCOUNTER — TELEPHONE (OUTPATIENT)
Dept: PSYCHIATRY | Facility: CLINIC | Age: 64
End: 2024-01-29

## 2024-01-29 NOTE — TELEPHONE ENCOUNTER
Called patient to collect updated insurance. We only have medicare part A on file and that does not cover mental health outpatient.

## 2024-02-19 ENCOUNTER — APPOINTMENT (OUTPATIENT)
Dept: LAB | Facility: CLINIC | Age: 64
End: 2024-02-19
Payer: MEDICARE

## 2024-02-19 ENCOUNTER — OFFICE VISIT (OUTPATIENT)
Dept: INTERNAL MEDICINE CLINIC | Facility: CLINIC | Age: 64
End: 2024-02-19
Payer: MEDICARE

## 2024-02-19 ENCOUNTER — TELEPHONE (OUTPATIENT)
Dept: INTERNAL MEDICINE CLINIC | Facility: CLINIC | Age: 64
End: 2024-02-19

## 2024-02-19 VITALS
TEMPERATURE: 96.2 F | OXYGEN SATURATION: 99 % | DIASTOLIC BLOOD PRESSURE: 80 MMHG | BODY MASS INDEX: 33.13 KG/M2 | SYSTOLIC BLOOD PRESSURE: 122 MMHG | HEART RATE: 61 BPM | WEIGHT: 180 LBS | HEIGHT: 62 IN

## 2024-02-19 DIAGNOSIS — E78.00 PURE HYPERCHOLESTEROLEMIA: ICD-10-CM

## 2024-02-19 DIAGNOSIS — Z79.899 ENCOUNTER FOR LONG-TERM CURRENT USE OF MEDICATION: ICD-10-CM

## 2024-02-19 DIAGNOSIS — K51.819 OTHER ULCERATIVE COLITIS WITH COMPLICATION (HCC): ICD-10-CM

## 2024-02-19 DIAGNOSIS — F51.04 PSYCHOPHYSIOLOGICAL INSOMNIA: ICD-10-CM

## 2024-02-19 DIAGNOSIS — Z12.31 ENCOUNTER FOR SCREENING MAMMOGRAM FOR BREAST CANCER: ICD-10-CM

## 2024-02-19 DIAGNOSIS — Z72.0 TOBACCO USE: ICD-10-CM

## 2024-02-19 DIAGNOSIS — K51.819 OTHER ULCERATIVE COLITIS WITH COMPLICATION (HCC): Primary | ICD-10-CM

## 2024-02-19 DIAGNOSIS — E03.9 ACQUIRED HYPOTHYROIDISM: ICD-10-CM

## 2024-02-19 DIAGNOSIS — F41.1 GENERALIZED ANXIETY DISORDER WITH PANIC ATTACKS: ICD-10-CM

## 2024-02-19 DIAGNOSIS — M85.80 OSTEOPENIA, UNSPECIFIED LOCATION: ICD-10-CM

## 2024-02-19 DIAGNOSIS — I77.810 MILD DILATION OF ASCENDING AORTA (HCC): ICD-10-CM

## 2024-02-19 DIAGNOSIS — F33.1 MAJOR DEPRESSIVE DISORDER, RECURRENT EPISODE, MODERATE (HCC): ICD-10-CM

## 2024-02-19 DIAGNOSIS — K59.04 CHRONIC IDIOPATHIC CONSTIPATION: ICD-10-CM

## 2024-02-19 DIAGNOSIS — E55.9 VITAMIN D DEFICIENCY: ICD-10-CM

## 2024-02-19 DIAGNOSIS — E66.9 OBESITY, CLASS II, BMI 35-39.9: ICD-10-CM

## 2024-02-19 DIAGNOSIS — E03.9 ACQUIRED HYPOTHYROIDISM: Primary | ICD-10-CM

## 2024-02-19 DIAGNOSIS — G44.209 TENSION HEADACHE: ICD-10-CM

## 2024-02-19 DIAGNOSIS — F41.0 GENERALIZED ANXIETY DISORDER WITH PANIC ATTACKS: ICD-10-CM

## 2024-02-19 DIAGNOSIS — F90.2 ADHD (ATTENTION DEFICIT HYPERACTIVITY DISORDER), COMBINED TYPE: ICD-10-CM

## 2024-02-19 PROBLEM — M25.531 RIGHT WRIST PAIN: Status: RESOLVED | Noted: 2023-02-01 | Resolved: 2024-02-19

## 2024-02-19 LAB
25(OH)D3 SERPL-MCNC: 28.3 NG/ML (ref 30–100)
ALBUMIN SERPL BCP-MCNC: 4.3 G/DL (ref 3.5–5)
ALP SERPL-CCNC: 55 U/L (ref 34–104)
ALT SERPL W P-5'-P-CCNC: 31 U/L (ref 7–52)
ANION GAP SERPL CALCULATED.3IONS-SCNC: 3 MMOL/L
AST SERPL W P-5'-P-CCNC: 25 U/L (ref 13–39)
BILIRUB SERPL-MCNC: 0.54 MG/DL (ref 0.2–1)
BUN SERPL-MCNC: 17 MG/DL (ref 5–25)
CALCIUM SERPL-MCNC: 9.3 MG/DL (ref 8.4–10.2)
CHLORIDE SERPL-SCNC: 106 MMOL/L (ref 96–108)
CO2 SERPL-SCNC: 28 MMOL/L (ref 21–32)
CREAT SERPL-MCNC: 0.75 MG/DL (ref 0.6–1.3)
GFR SERPL CREATININE-BSD FRML MDRD: 83 ML/MIN/1.73SQ M
GLUCOSE P FAST SERPL-MCNC: 79 MG/DL (ref 65–99)
POTASSIUM SERPL-SCNC: 4.2 MMOL/L (ref 3.5–5.3)
PROT SERPL-MCNC: 7.3 G/DL (ref 6.4–8.4)
SODIUM SERPL-SCNC: 137 MMOL/L (ref 135–147)
T4 FREE SERPL-MCNC: 0.72 NG/DL (ref 0.61–1.12)
TSH SERPL DL<=0.05 MIU/L-ACNC: 7.88 UIU/ML (ref 0.45–4.5)
VIT B12 SERPL-MCNC: 413 PG/ML (ref 180–914)

## 2024-02-19 PROCEDURE — 82607 VITAMIN B-12: CPT

## 2024-02-19 PROCEDURE — 99214 OFFICE O/P EST MOD 30 MIN: CPT | Performed by: INTERNAL MEDICINE

## 2024-02-19 PROCEDURE — 80053 COMPREHEN METABOLIC PANEL: CPT

## 2024-02-19 PROCEDURE — 36415 COLL VENOUS BLD VENIPUNCTURE: CPT

## 2024-02-19 PROCEDURE — 84439 ASSAY OF FREE THYROXINE: CPT

## 2024-02-19 PROCEDURE — 82306 VITAMIN D 25 HYDROXY: CPT

## 2024-02-19 PROCEDURE — 84443 ASSAY THYROID STIM HORMONE: CPT

## 2024-02-19 RX ORDER — LINACLOTIDE 145 UG/1
145 CAPSULE, GELATIN COATED ORAL DAILY
COMMUNITY
Start: 2024-01-23

## 2024-02-19 NOTE — PROGRESS NOTES
Assessment/Plan:    Acquired hypothyroidism  Stable, taking medication appropriately.    Pure hypercholesterolemia  On atorvastatin 10 mg.    Ulcerative colitis (HCC)  On Lialda.  Sees GI.    Tobacco use  Smoking less, both cigarettes and using vape.  Encouraged complete smoking cessation.    Obesity, Class II, BMI 35-39.9  Weight gain 10 lbs since last visit.  Reviewed diet, portions.    Insomnia  On trazodone, per psych.    Generalized anxiety disorder with panic attacks  On Lexapro 10 mg.  Taking hydroxyzine tid, per psych.    ADHD (attention deficit hyperactivity disorder), combined type  On Adderall, takes it at least once ea day.  Follow up with psych.    Chronic idiopathic constipation  Restart daily Linzess, may take before dinner.    Other fatigue  Has not done sleep study.    Osteopenia  Continue daily walks and supplements.     Diagnoses and all orders for this visit:    Other ulcerative colitis with complication (HCC)  -     Vitamin B12; Future  -     Vitamin D 25 hydroxy; Future    Pure hypercholesterolemia  -     Comprehensive metabolic panel; Future    Obesity, Class II, BMI 35-39.9    Major depressive disorder, recurrent episode, moderate (HCC)    Chronic idiopathic constipation    ADHD (attention deficit hyperactivity disorder), combined type    Acquired hypothyroidism  -     TSH, 3rd generation with Free T4 reflex; Future    Encounter for screening mammogram for breast cancer  -     Mammo screening bilateral w 3d & cad; Future    Vitamin D deficiency  -     Vitamin D 25 hydroxy; Future    Encounter for long-term current use of medication  -     Vitamin B12; Future    Mild dilation of ascending aorta (HCC)    Tension headache    Generalized anxiety disorder with panic attacks    Psychophysiological insomnia    Osteopenia, unspecified location    Tobacco use    Other orders  -     Linzess 145 MCG CAPS; Take 145 mcg by mouth daily      Follow up in 6 months or as needed.      Subjective:      Patient  ID: Jeri Acosta is a 63 y.o. female here for a follow up.    She is here with her  today.  She complains of a mild ache on her left lower quadrant area.  She reports it is not there daily.  She has not been taking her Linzess every morning since she would need to move her bowels a few hours later and she is at work.  She reports ongoing constipation, moves her bowels at least every 3 or 4 days, about twice a week.  She has been taking the Lialda, down to 2 tablets daily.  She does have an appointment with GI later this month.    She also complains of an occasional right-sided throbbing headache.  This is intermittent, none radiating.  Denies any vision loss or eye symptoms.  She takes Tylenol as needed with good relief.  She noticed that there is a change in color and her eyes, no blurring of vision, eye pain, redness or discharge.  She does have an appointment with her eye doctor coming up.    She continues to smoke cigarettes, combines it with vaping.  She feels she is smoking less.  She had gained weight, just returned from a cruise.      The following portions of the patient's history were reviewed and updated as appropriate: allergies, current medications, past medical history, past social history, and problem list.    Review of Systems   Constitutional:  Negative for activity change, appetite change and fatigue.   HENT:  Negative for congestion.    Eyes:  Negative for visual disturbance.   Respiratory:  Negative for cough and shortness of breath.    Cardiovascular:  Negative for chest pain, palpitations and leg swelling.   Gastrointestinal:  Positive for constipation. Negative for abdominal pain, diarrhea and nausea.   Genitourinary:  Negative for dysuria, frequency and urgency.   Musculoskeletal:  Negative for arthralgias and myalgias.   Skin:  Negative for rash and wound.   Neurological:  Positive for headaches. Negative for dizziness and numbness.   Hematological:  Does not bruise/bleed easily.  "  Psychiatric/Behavioral:  Negative for confusion and dysphoric mood. The patient is not nervous/anxious.          Objective:      /80   Pulse 61   Temp (!) 96.2 °F (35.7 °C)   Ht 5' 2\" (1.575 m)   Wt 81.6 kg (180 lb)   SpO2 99%   BMI 32.92 kg/m²          Physical Exam  Vitals and nursing note reviewed.   Constitutional:       General: She is not in acute distress.     Appearance: She is well-developed.   HENT:      Head: Normocephalic and atraumatic.   Eyes:      Extraocular Movements: Extraocular movements intact.      Conjunctiva/sclera: Conjunctivae normal.      Pupils: Pupils are equal, round, and reactive to light.   Cardiovascular:      Rate and Rhythm: Normal rate and regular rhythm.      Heart sounds: Normal heart sounds.   Pulmonary:      Effort: Pulmonary effort is normal.      Breath sounds: Normal breath sounds. No wheezing.   Abdominal:      General: Bowel sounds are normal.      Palpations: Abdomen is soft.   Musculoskeletal:         General: No swelling.      Cervical back: Spasms present. No tenderness or bony tenderness. No pain with movement.      Right lower leg: No edema.      Left lower leg: No edema.      Comments: Abnormal posture, right shoulder elevated   Skin:     General: Skin is warm.      Findings: No rash.   Neurological:      General: No focal deficit present.      Mental Status: She is alert and oriented to person, place, and time.   Psychiatric:         Mood and Affect: Mood and affect normal. Mood is not anxious or depressed.         Behavior: Behavior normal.         Labs & imaging results reviewed with patient.      "

## 2024-02-19 NOTE — PATIENT INSTRUCTIONS
Neck Exercises   WHAT YOU NEED TO KNOW:   What do I need to know about neck exercises?  Neck exercises help reduce neck pain, and improve neck movement and strength. Neck exercises also help prevent long-term neck problems.  What do I need to know about neck exercise safety?   Move slowly, gently, and smoothly.  Avoid fast or jerky motions.    Stand and sit the way your healthcare provider shows you.  Good posture may reduce your neck pain. Check your posture often, even when you are not doing your neck exercises.    Follow the exercise program recommended by your healthcare provider.  He or she will tell you which exercises are best for your condition. He or she will also tell you how many repetitions to do and how often you should do the exercises.    How do I perform neck exercises safely?   Exercise position:  You may sit or stand while you do neck exercises. Face forward. Your shoulders should be straight and relaxed, with a good posture.         Head tilts, forward and back:  Gently bow your head and try to touch your chin to your chest. Your healthcare provider may tell you to push on the back of your neck to help bow your head. Raise your chin back to the starting position. Tilt your head back as far as possible so you are looking up at the ceiling. Your healthcare provider may tell you to lift your chin to help tilt your head back. Return your head to the starting position.         Head tilts, side to side:  Tilt your head, bringing your ear toward your shoulder. Then tilt your head toward the other shoulder.         Head turns:  Turn your head to look over your shoulder. Tilt your chin down and try to touch it to your shoulder. Do not raise your shoulder to your chin. Face forward again. Do the same on the other side.       When should I call my doctor?   Your pain does not get better, or gets worse.    You have questions or concerns about your condition, care, or exercise program.    CARE AGREEMENT:   You  have the right to help plan your care. Learn about your health condition and how it may be treated. Discuss treatment options with your healthcare providers to decide what care you want to receive. You always have the right to refuse treatment. The above information is an  only. It is not intended as medical advice for individual conditions or treatments. Talk to your doctor, nurse or pharmacist before following any medical regimen to see if it is safe and effective for you.  © Copyright [employer] 2023 Information is for End User's use only and may not be sold, redistributed or otherwise used for commercial purposes.

## 2024-02-19 NOTE — TELEPHONE ENCOUNTER
Lab results:    Are you taking your thyroid medication on an empty stomach? Your levels were a bit off. I can adjust the dose or we can recheck levels in a few months. Let me know.    Make sure you are taking your vitamin B12 500 mcg daily and vitamin D3 2000 units daily.    The rest of your labs were normal.

## 2024-02-20 DIAGNOSIS — F90.2 ADHD (ATTENTION DEFICIT HYPERACTIVITY DISORDER), COMBINED TYPE: ICD-10-CM

## 2024-02-20 RX ORDER — LEVOTHYROXINE SODIUM 0.1 MG/1
TABLET ORAL
Start: 2024-02-20

## 2024-02-20 RX ORDER — DEXTROAMPHETAMINE SACCHARATE, AMPHETAMINE ASPARTATE, DEXTROAMPHETAMINE SULFATE AND AMPHETAMINE SULFATE 2.5; 2.5; 2.5; 2.5 MG/1; MG/1; MG/1; MG/1
1 TABLET ORAL 2 TIMES DAILY
Qty: 60 TABLET | Refills: 0 | Status: SHIPPED | OUTPATIENT
Start: 2024-02-20 | End: 2024-03-21

## 2024-02-20 NOTE — TELEPHONE ENCOUNTER
Please adjust thyroid medication.  Take 2 tablets one day a week, take 1 tablet the rest of the week.  Repeat labs in a few weeks.  Ordered, printed. Please mail to patient.

## 2024-02-20 NOTE — TELEPHONE ENCOUNTER
Medication Refill Request     Name of Medication Adderall 10 mg tablet  Dose/Frequency take 1 tablet by mouth 2 times a day  Quantity 10 mg tablet  Verified pharmacy   [x]  Verified ordering Provider   [x]  Does patient have enough for the next 3 days? Yes [x] No []  Does patient have a follow-up appointment scheduled? Yes [x] No []   If so when is appointment: 2/22/24

## 2024-02-20 NOTE — TELEPHONE ENCOUNTER
Patient states yes taking on empty stomach  States she has had an increase in fatigue and hair falling out, OK to adjust dose and recheck labs in a few months

## 2024-02-21 NOTE — PSYCH
Jeri Acosta did not attend today's (02/22/24) appointment and did not inform clerical staff of her potential absence on days prior to the evaluation. This is Jeri's 5th no-call, no-show since treatment initiation in 2020. As per clinic policy, she will be terminated from medication management services secondary to failing to prioritize her MH treatment. Discharge letter will be mailed to home with community-based resources for continued management.    Treatment Plan not completed within required time limits due to: Jeri Vernonnathalia no show appointment on 02/22/24

## 2024-02-22 ENCOUNTER — TELEPHONE (OUTPATIENT)
Dept: PSYCHIATRY | Facility: CLINIC | Age: 64
End: 2024-02-22

## 2024-02-22 ENCOUNTER — DOCUMENTATION (OUTPATIENT)
Dept: PSYCHIATRY | Facility: CLINIC | Age: 64
End: 2024-02-22

## 2024-02-22 ENCOUNTER — TELEMEDICINE (OUTPATIENT)
Dept: PSYCHIATRY | Facility: CLINIC | Age: 64
End: 2024-02-22

## 2024-02-22 DIAGNOSIS — Z91.199 NO-SHOW FOR APPOINTMENT: Primary | ICD-10-CM

## 2024-02-22 NOTE — Clinical Note
Please create D/C Summary and terminate from services at the clinic. This is her 5th no-show since onset of treatment. Thank you.

## 2024-02-22 NOTE — TELEPHONE ENCOUNTER
Patient was discharged from provider care as of 2/2024. Letter created and printed for provider signature.

## 2024-02-22 NOTE — PSYCH
"PSYCHIATRIC DISCHARGE SUMMARY    [facility]    Name and Date of Birth:  Jeri Acosta 63 y.o. 1960    Admission Date: 10/2/2020    Discharge Date: 2/22/2024     Referral source: self    Discharge Type: Non Compliance with Treatment, did not return for follow-up    Discharge Diagnosis:     1.) Generalized anxiety disorder with panic attacks  2.) Major Depressive Disorder, recurrent episode, moderate  3.) ADHD, adult type  4.) Insomnia    Treating Physician: Dr. Hemant Cordero     Treatment Complications: Non Compliance with Treatment. Did not return for follow up. Not willing to follow recommendations.     Admit with discharge: No    Prognosis at time of discharge: Fair    Presenting Problems/Pertinent Findings:      As per Dr. Cordero's HPI on 10/2/2020:    \"Jeri Acosta is a 60 y.o. female with a past psychiatric history significant for generalized anxiety disorder with panic attacks, MDD, and ADHD who presents to the [facility] outpatient clinic for intake assessment. Jeri presents as calm, pleasant, and cooperative. She completes evaluation without significant difficulty.     Jeri endorses worsening anxiety, \"panic attacks\", and intermittent depressive symptomatology over the past 10 years in the context of numerous psychosocial stressors including financial instability, familial discord, and homelessness. Jeri states that her life was \"normal\" until her  was arrested and incarcerated for \"a white collar crime\" that she declines to discuss. After his incarceration, Jeri was without income and unable to pay the mortgage on their house. They sold there house and she spent several years living in and out of motels and hotels within the [facility]. During this period, she has been increasingly more depressed and notices a profound increase in debilitating anxiety and subsequent panic attacks. She describes one incident in which " "she drove to the grocery store and become consumed by anxiety without a specific trigger. She was unable to exit her vehicle and endorsed SOB, palpitations, diaphoresis, and tremor. Aggravating factors include familial stress, poor finances, and \"being out in society\". Alleviating factors include mindfulness and self-help books about guided imagery. Her PCP prescribed Xanax 1mg PO BID with limited benefit. In addition to anxiousness, Jeri endorses several neurovegetative symptoms of depression. Her sleep is poor and her appetite is limited. She endorses a 30lb weight gain over the last year without significant dietary intake. Her energy is \"nonexhistent\" and her ability to concentrate is quite limited. Her PCP prescribed Adderall 5mg PO BID given her history of ADHD with fair benefit. She states that she is now able to read a book, something she finds pleasurable. Jeri endorses anhedonia, limited interaction with grandchildren, and episodic hopelessness and guilt. She denies futility or despair. Jeri adamantly denies acute thoughts of suicide or self-harm and stated that she has never possessed such thoughts. In the past, Jeri was treated by Dr. Pang with Zoloft which was optimized to 200mg. She endorsed limited improvement in mood and severe nightmares so this agent was discontinued.      Jeri denies acute or chronic symptomatology suggestive of tiffany or hypomania. During today's evaluation, she does not exhibit objective evidence of grandiosity, psychomotor restlessness, irritability, nor is she pressured or rapid in speech. She denies recent periods without sleep or increased goal-directed behavior. She has no history of excessive spending and subsequent debt accumulation nor does she endorse sexual promiscuity. Acutely, Jeri denies perceptual disturbances such as A/V hallucinations, paranoia, ideas of reference or delusional thoughts. She does not appear internally preoccupied or easily distracted today. " "She denies recent ETOH or illicit substance abuse but does smoke cigarretes. She does appear under the influence of any psychoactive substance. She has no access to weapons/firearms. Of note, recently, given excessive weight gain and fatigue, Jeri was tested for thyroid dysfunction and was subsequently diagnosed with hypothyroidism. She has just started treatment and denies feeling the effects of Synthroid.      Jeri's  was recently (within the last 4 months) discharged from [facility] secondary to the COVID-19 outbreak. She shares that he continues to wear an ankle monitoring which limits their ability to travel or socialize. Nonetheless, she has noticed that her anxiety has mildly improved since his return home as she feels more \"secure\". They remain homeless and living on Jeri's brother-in-law's couch. However, they are in contact with the state who is proving assistance with housing within the next 2-3 weeks, which is \"reassuring\". Jeri was provided extensive psychoeducation and psychotherapeutic exercises throughout intake session that assisted with observation of negative and distorted cognitions.\"      Past Psychiatric History:      Inpatient psychiatric admissions: Denies  Prior outpatient psychiatric linkage: Previously linked with Dr. Pang via [facility]  Past/current psychotherapy: Denies  History of suicidal attempts/gestures: Denies  History of violence/aggressive behaviors: Denies  Psychotropic medication trials: Seroquel (sleep - too sedating), Zoloft (minimal benefit, gave her nightmares), Adderall, Xanax, Trazodone, Pristiq, Hydroxyzine (now)  Substance abuse inpatient/outpatient rehabilitation: Denies     Substance Abuse History:     Denies a history of ETOH or illicit substance abuse. No prior DWIs or DUIs. No prior involvement with law enforcement secondary to substance use.     Social History:     Developmental: Denies a history of milestone/developmental delay. Denies a history of in-utero " exposure to toxins/illicit substances. 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 Jeri Acosta has no history of arrests or violence with a deadly weapon.      Traumatic History:     Abuse:none is reported  Other Traumatic Events:  being incarcerated was difficult     Past Medical History:    Past Medical History:   Diagnosis Date    Anxiety     Anxiety     Bunion     Colon polyp     Difficulty walking     Panic attacks     Thyroid disease     Visual impairment         Past Surgical History:   Procedure Laterality Date     SECTION      COLONOSCOPY      HYSTERECTOMY      TUBAL LIGATION  82       Allergies:    No Known Allergies    Substance Abuse History:     Social History     Substance and Sexual Activity   Drug Use No    Types: Cocaine    [facility]     Social History     Substance and Sexual Activity   Alcohol Use Yes    Alcohol/week: 1.0 standard drink of alcohol    Types: 1 Glasses of wine per week    Comment: Rare- 1 every few weeks        Family Psychiatric History:     Family History   Problem Relation Age of Onset    Lung cancer Mother     Cancer Mother     Hypothyroidism Daughter     Thyroid disease Daughter     Ovarian cancer Maternal Grandmother     Glaucoma Paternal Grandmother     Substance Abuse Brother     Asthma Son     Alcohol abuse Neg Hx     Depression Neg Hx     Drug abuse Neg Hx     Diabetes Neg Hx     Hypertension Neg Hx     Heart disease Neg Hx     Stroke Neg Hx        Social History/Trauma History/Past Psychiatric  History:    Social History     Socioeconomic History    Marital status: /Civil Union     Spouse name: Not on file    Number of children: Not on file    Years of education: Not on file    Highest education level: Not on file   Occupational History    Not on file   Tobacco Use    Smoking status: Every Day     Current packs/day: 0.50     Average packs/day: 0.5 packs/day for 45.0 years (22.5 ttl pk-yrs)     Types: Cigarettes     Start date: 1/4/1980    Smokeless tobacco: Never    Tobacco comments:     since age 16   Vaping Use    Vaping status: Never Used   Substance and Sexual Activity    Alcohol use: Yes     Alcohol/week: 1.0 standard drink of alcohol     Types: 1 Glasses of wine per week     Comment: Rare- 1 every few weeks     Drug use: No     Types: Cocaine     Comment: coccaine use 1978 as per allscripts    Sexual activity: Yes     Partners: Male     Birth control/protection: None   Other Topics Concern    Not on file   Social History Narrative        Has a son and a daughter in [place] - 3 grandchildren    Working - at a restaurant    Living in a motel while  incarcerated almost 2 yrs        Drinks Coffee: 3-4 cups     Social Determinants of Health     Financial Resource Strain: Low Risk  (8/9/2023)    Overall Financial Resource Strain (CARDIA)     Difficulty of Paying Living Expenses: Not very hard   Food Insecurity: Not on file   Transportation Needs: No Transportation Needs (8/9/2023)    PRAPARE - Transportation     Lack of Transportation (Medical): No     Lack of Transportation (Non-Medical): No   Physical Activity: Not on file   Stress: Not on file   Social Connections: Not on file   Intimate Partner Violence: Not on file   Housing Stability: Not on file         Therapist: None      Course of Treatment: Psychiatric Evaluation and Medication Management      Summary of Treatment Progress:     Jeri was seen for initial psychiatric evaluation and medication management. Jeri was originally  managed with Pristiq (up to 100mg) for anxity and depression as well as PRN Hydroxyzine. She was continued on Trazodone for Insomnia. At intake session, Adderall 5mg BID was continued (originally started by PCP) and ultimately optimized to 10mg BID. During her last follow-up visit in August 2023, Pristiq was volitionally discontinued by Jeri and Lexapro was started. Unfortunately, Jeri Acosta was lost to follow-up after August 2023 session. She ultimately canceled her follow-up visit on 1/30/24 and no-call, no-showed her F/U visit on 2/22/24. This was Jeri's 5th no-call, no-show since onset of treatment in 2020. Given failure to prioritize mental health treatment, Jeri Acosta will be discharged from clinic.         Suicide/Homicide Risk Assessment at last office visit on 8/15/2023: No overt lethality concerns.    Lethality Risk at the time of discharge from clinic: LOW.     At the time of the most recent psychiatric assessment, Jeri was future-oriented, forward-thinking, and demonstrated ability to act in a self-preserving manner as evidenced by volitionally presenting to the clinic, seeking treatment. To mitigate future risk, patient should adhere to treatment recommendations, avoid alcohol/illicit substance use, utilize community-based resources and familiar support, and prioritize mental health treatment.       History Review:  The following portions of the patient's history were reviewed and updated as appropriate: allergies, current medications, past family history, past medical history, past social history, past surgical history, and problem list.. Reviewed on 8/15/23.      MENTAL STATUS EVALUATION (at time of most recent visit on 8/15/23):         Appearance age appropriate, casually dressed, dressed appropriately, looks stated age   Behavior pleasant, cooperative, appears anxious   Speech normal rate, normal volume, normal pitch   Mood anxious   Affect constricted   Thought Processes  organized, coherent, goal directed   Associations intact associations   Thought Content no overt delusions   Perceptual Disturbances: no auditory hallucinations, no visual hallucinations   Abnormal Thoughts  Risk Potential Suicidal ideation - None at present  Homicidal ideation - None at present  Potential for aggression - No   Orientation oriented to person, place, time/date and situation   Memory recent and remote memory grossly intact   Consciousness alert and awake   Attention Span Concentration Span attention span and concentration are age appropriate   Intellect appears to be of average intelligence   Insight intact and good   Judgement intact and good   Muscle Strength and  Gait normal gait and normal balance   Motor activity no abnormal movements   Language no difficulty naming common objects   Fund of Knowledge adequate knowledge of current events   Pain none   Pain Scale Did not ask patient to formally rate     To what extent did Jeri achieve her goals?: Some      Criteria for Discharge: Has 2 or more unexcused absences for services. Did not return for treatment. Did not agree to follow treatment recommendations.      Aftercare Recommendations:     Follow up with psychiatrist recommended.      Discharge Medications:     Current Outpatient Medications:     amphetamine-dextroamphetamine (ADDERALL) 10 mg tablet, Take 1 tablet (10 mg total) by mouth 2 (two) times a day Max Daily Amount: 20 mg, Disp: 60 tablet, Rfl: 0    aspirin (ECOTRIN LOW STRENGTH) 81 mg EC tablet, 81 mg, Disp: , Rfl:     atorvastatin (LIPITOR) 10 mg tablet, TAKE 1 TABLET(10 MG) BY MOUTH DAILY, Disp: 90 tablet, Rfl: 1    escitalopram (Lexapro) 10 mg tablet, Take 0.5 tablets (5 mg total) by mouth daily for 7 days, THEN 1 tablet (10 mg total) daily., Disp: 90 tablet, Rfl: 1    hydrOXYzine HCL (ATARAX) 25 mg tablet, Take 1 tablet (25mg) up to three times daily as needed for anxiety and panic symptoms., Disp: 90 tablet, Rfl: 3    levothyroxine  100 mcg tablet, Take 2 tablets x 1 day, 1 tab the rest of the week, Disp: , Rfl:     Linzess 145 MCG CAPS, Take 145 mcg by mouth daily, Disp: , Rfl:     mesalamine (LIALDA) 1.2 g EC tablet, Take 2 tablets by mouth in the morning, Disp: , Rfl:     Multiple Vitamins-Minerals (multivitamin with minerals) tablet, Take 1 tablet by mouth daily, Disp: , Rfl:     nicotine (NICODERM CQ) 21 mg/24 hr TD 24 hr patch, Place 1 patch on the skin over 24 hours every 24 hours, Disp: 28 patch, Rfl: 2    traZODone (DESYREL) 100 mg tablet, Take 1 tablet (100 mg total) by mouth daily at bedtime as needed for sleep, Disp: 30 tablet, Rfl: 2     Describe ability and willingness to work and solve mental problems:     May have difficulty solving her mental health problems    Hemant Cordero MD 02/22/24

## 2024-02-26 NOTE — TELEPHONE ENCOUNTER
DISCHARGE LETTER for Hemant Cordero MD (certified and regular) placed in outgoing mail on 02/26/24.    Article #:  [id number]    Address:  [address] [place] [id number]

## 2024-03-05 ENCOUNTER — TELEPHONE (OUTPATIENT)
Age: 64
End: 2024-03-05

## 2024-03-05 NOTE — TELEPHONE ENCOUNTER
You can take Claritin daily and Dayquil twice a day.  If with cough and fevers, please check for COVID.

## 2024-03-05 NOTE — TELEPHONE ENCOUNTER
Claritin at 8 this morning.  Can she take dayquil instead and when can she take it since she took Claritin this morning.  He stated the dayquil does not have an antihistamine in it.    Please call Roberto back at  [phone]  Chele

## 2024-03-26 DIAGNOSIS — G47.00 INSOMNIA: ICD-10-CM

## 2024-03-26 DIAGNOSIS — F90.2 ADHD (ATTENTION DEFICIT HYPERACTIVITY DISORDER), COMBINED TYPE: ICD-10-CM

## 2024-03-26 RX ORDER — DEXTROAMPHETAMINE SACCHARATE, AMPHETAMINE ASPARTATE, DEXTROAMPHETAMINE SULFATE AND AMPHETAMINE SULFATE 2.5; 2.5; 2.5; 2.5 MG/1; MG/1; MG/1; MG/1
1 TABLET ORAL 2 TIMES DAILY
Qty: 60 TABLET | Refills: 0 | Status: CANCELLED | OUTPATIENT
Start: 2024-03-26 | End: 2024-04-25

## 2024-03-26 RX ORDER — TRAZODONE HYDROCHLORIDE 100 MG/1
100 TABLET ORAL
Qty: 30 TABLET | Refills: 2 | Status: CANCELLED | OUTPATIENT
Start: 2024-03-26

## 2024-03-26 NOTE — TELEPHONE ENCOUNTER
Medication Refill Request     Name of Medication adderall  Dose/Frequency 10mg take 1 tablewt by mouth 2 times a day  Quantity 60  Verified pharmacy   [x]  Verified ordering Provider   [x]  Does patient have enough for the next 3 days? Yes [x] No []  Does patient have a follow-up appointment scheduled? Yes [] No [x]   If so when is appointment:     Medication Refill Request     Name of Medication trazodone  Dose/Frequency 100mg take 1 tablet by mouth daily at bedtime as needed for sleep  Quantity 30  Verified pharmacy   [x]  Verified ordering Provider   [x]  Does patient have enough for the next 3 days? Yes [] No [x]  Does patient have a follow-up appointment scheduled? Yes [] No [x]   If so when is appointment:

## 2024-03-26 NOTE — TELEPHONE ENCOUNTER
Please call patient to schedule follow up. Appointment needed for renewal to be reviewed by covering provider. Thank You

## 2024-03-27 NOTE — TELEPHONE ENCOUNTER
Patient was discharged from Dr. Cordero's care 2/2024. Patient must be placed on wait list for another provider at this time and follow up with PCP. Please call patient and inform. Thank you.

## 2024-03-28 ENCOUNTER — TELEPHONE (OUTPATIENT)
Dept: PSYCHIATRY | Facility: CLINIC | Age: 64
End: 2024-03-28

## 2024-03-28 NOTE — TELEPHONE ENCOUNTER
Writer called patient and lvm letting her know to being discharged from provider he will no longer refill her medication and she will need to go to her primary care DR for refills at this time

## 2024-03-28 NOTE — TELEPHONE ENCOUNTER
Please contact patient to let them know that due to the discharge 2/22/2024 Dr. Cordero will not be refilling medication. Pt will need to be added back to the wait list at this time.

## 2024-04-02 ENCOUNTER — TELEPHONE (OUTPATIENT)
Dept: PSYCHIATRY | Facility: CLINIC | Age: 64
End: 2024-04-02

## 2024-04-02 DIAGNOSIS — F90.2 ADHD (ATTENTION DEFICIT HYPERACTIVITY DISORDER), COMBINED TYPE: ICD-10-CM

## 2024-04-02 DIAGNOSIS — G47.00 INSOMNIA: ICD-10-CM

## 2024-04-02 RX ORDER — DEXTROAMPHETAMINE SACCHARATE, AMPHETAMINE ASPARTATE, DEXTROAMPHETAMINE SULFATE AND AMPHETAMINE SULFATE 2.5; 2.5; 2.5; 2.5 MG/1; MG/1; MG/1; MG/1
1 TABLET ORAL 2 TIMES DAILY
Qty: 60 TABLET | Refills: 0 | Status: SHIPPED | OUTPATIENT
Start: 2024-04-02 | End: 2024-05-02

## 2024-04-02 RX ORDER — TRAZODONE HYDROCHLORIDE 100 MG/1
100 TABLET ORAL
Qty: 30 TABLET | Refills: 0 | Status: SHIPPED | OUTPATIENT
Start: 2024-04-02

## 2024-04-02 NOTE — TELEPHONE ENCOUNTER
Jeri's , Roberto, left a similar VM with nursing. He referenced the discharge letter noting 45 day prescription could be provided. He requested a call to their cell [phone].

## 2024-04-02 NOTE — TELEPHONE ENCOUNTER
Spoke with Roberto and informed him prescriptions were went by a covering provider. He was appreciative.

## 2024-04-02 NOTE — TELEPHONE ENCOUNTER
of patient called in stating the patietn received a discharge letter stating that the treatment was complete with patients medication management provider but that medication refill requests are able to be approved and refilled with in 45 days of receiving the letter.    Writer stated they would make the provider aware and the clinical staff aware of the medication questions and requests to discuss further.

## 2024-04-02 NOTE — TELEPHONE ENCOUNTER
Spoke with Roberto. Jeri has an appointment with her PCP in 3 weeks and it's expected the PCP will manage medications from then on. Roberto is requesting a refill of trazodone and Adderall to hold Jeri over until then.     Forwarding to covering provider in Dr. Cordero's absence. Please review/send to [facility] in preferred pharmacy list.

## 2024-04-16 DIAGNOSIS — E03.9 ACQUIRED HYPOTHYROIDISM: ICD-10-CM

## 2024-04-16 RX ORDER — LEVOTHYROXINE SODIUM 0.1 MG/1
TABLET ORAL
Qty: 90 TABLET | Refills: 1 | Status: SHIPPED | OUTPATIENT
Start: 2024-04-16

## 2024-05-06 ENCOUNTER — APPOINTMENT (OUTPATIENT)
Dept: LAB | Facility: CLINIC | Age: 64
End: 2024-05-06
Payer: MEDICARE

## 2024-05-06 DIAGNOSIS — E03.9 ACQUIRED HYPOTHYROIDISM: ICD-10-CM

## 2024-05-06 LAB — TSH SERPL DL<=0.05 MIU/L-ACNC: 3.14 UIU/ML (ref 0.45–4.5)

## 2024-05-06 PROCEDURE — 84443 ASSAY THYROID STIM HORMONE: CPT

## 2024-05-06 PROCEDURE — 36415 COLL VENOUS BLD VENIPUNCTURE: CPT

## 2024-05-10 DIAGNOSIS — G47.00 INSOMNIA: ICD-10-CM

## 2024-05-10 RX ORDER — TRAZODONE HYDROCHLORIDE 100 MG/1
100 TABLET ORAL
Qty: 30 TABLET | Refills: 0 | Status: SHIPPED | OUTPATIENT
Start: 2024-05-10

## 2024-05-13 ENCOUNTER — TELEPHONE (OUTPATIENT)
Age: 64
End: 2024-05-13

## 2024-05-13 NOTE — TELEPHONE ENCOUNTER
Please call patient.  Ask if seeing psychiatrist and therapist. Recommend to continue seeing psychiatrist for medication refills, can see therapist as needed since no more insurance coverage/days.

## 2024-05-13 NOTE — TELEPHONE ENCOUNTER
Patient would like Dr. Tello to refill the medications that her therapist has been filling. Patient is out of insurance days for therapy and does not want to miss her medication regimen.  Appointment made with Dr. Tello on 5/20/2024.    Trazadone  Atarx  Lexapro  Adderall

## 2024-05-14 ENCOUNTER — APPOINTMENT (EMERGENCY)
Dept: CT IMAGING | Facility: HOSPITAL | Age: 64
End: 2024-05-14
Payer: MEDICARE

## 2024-05-14 ENCOUNTER — HOSPITAL ENCOUNTER (EMERGENCY)
Facility: HOSPITAL | Age: 64
Discharge: HOME | End: 2024-05-14
Attending: EMERGENCY MEDICINE
Payer: MEDICARE

## 2024-05-14 VITALS
DIASTOLIC BLOOD PRESSURE: 74 MMHG | SYSTOLIC BLOOD PRESSURE: 152 MMHG | BODY MASS INDEX: 34.76 KG/M2 | RESPIRATION RATE: 16 BRPM | HEART RATE: 51 BPM | TEMPERATURE: 97.5 F | OXYGEN SATURATION: 97 % | WEIGHT: 190.04 LBS

## 2024-05-14 DIAGNOSIS — R42 VERTIGO: Primary | ICD-10-CM

## 2024-05-14 DIAGNOSIS — R00.1 BRADYCARDIA: ICD-10-CM

## 2024-05-14 LAB
ALBUMIN SERPL BCP-MCNC: 4.2 G/DL (ref 3.5–5)
ALP SERPL-CCNC: 65 U/L (ref 34–104)
ALT SERPL W P-5'-P-CCNC: 16 U/L (ref 7–52)
ANION GAP SERPL CALCULATED.3IONS-SCNC: 6 MMOL/L (ref 4–13)
AST SERPL W P-5'-P-CCNC: 17 U/L (ref 13–39)
BASOPHILS # BLD AUTO: 0.02 THOUSANDS/ÂΜL (ref 0–0.1)
BASOPHILS NFR BLD AUTO: 0 % (ref 0–1)
BILIRUB SERPL-MCNC: 0.35 MG/DL (ref 0.2–1)
BUN SERPL-MCNC: 17 MG/DL (ref 5–25)
CALCIUM SERPL-MCNC: 9.3 MG/DL (ref 8.4–10.2)
CARDIAC TROPONIN I PNL SERPL HS: 3 NG/L
CHLORIDE SERPL-SCNC: 106 MMOL/L (ref 96–108)
CO2 SERPL-SCNC: 26 MMOL/L (ref 21–32)
CREAT SERPL-MCNC: 0.81 MG/DL (ref 0.6–1.3)
EOSINOPHIL # BLD AUTO: 0.15 THOUSAND/ÂΜL (ref 0–0.61)
EOSINOPHIL NFR BLD AUTO: 3 % (ref 0–6)
ERYTHROCYTE [DISTWIDTH] IN BLOOD BY AUTOMATED COUNT: 14.7 % (ref 11.6–15.1)
FLUAV RNA RESP QL NAA+PROBE: NEGATIVE
FLUBV RNA RESP QL NAA+PROBE: NEGATIVE
GFR SERPL CREATININE-BSD FRML MDRD: 76 ML/MIN/1.73SQ M
GLUCOSE SERPL-MCNC: 83 MG/DL (ref 65–140)
GLUCOSE SERPL-MCNC: 94 MG/DL (ref 65–140)
HCT VFR BLD AUTO: 39 % (ref 34.8–46.1)
HGB BLD-MCNC: 12.8 G/DL (ref 11.5–15.4)
IMM GRANULOCYTES # BLD AUTO: 0.01 THOUSAND/UL (ref 0–0.2)
IMM GRANULOCYTES NFR BLD AUTO: 0 % (ref 0–2)
LYMPHOCYTES # BLD AUTO: 2.28 THOUSANDS/ÂΜL (ref 0.6–4.47)
LYMPHOCYTES NFR BLD AUTO: 40 % (ref 14–44)
MAGNESIUM SERPL-MCNC: 1.8 MG/DL (ref 1.9–2.7)
MCH RBC QN AUTO: 27.5 PG (ref 26.8–34.3)
MCHC RBC AUTO-ENTMCNC: 32.8 G/DL (ref 31.4–37.4)
MCV RBC AUTO: 84 FL (ref 82–98)
MONOCYTES # BLD AUTO: 0.49 THOUSAND/ÂΜL (ref 0.17–1.22)
MONOCYTES NFR BLD AUTO: 9 % (ref 4–12)
NEUTROPHILS # BLD AUTO: 2.71 THOUSANDS/ÂΜL (ref 1.85–7.62)
NEUTS SEG NFR BLD AUTO: 48 % (ref 43–75)
NRBC BLD AUTO-RTO: 0 /100 WBCS
PLATELET # BLD AUTO: 247 THOUSANDS/UL (ref 149–390)
PMV BLD AUTO: 9.7 FL (ref 8.9–12.7)
POTASSIUM SERPL-SCNC: 4.2 MMOL/L (ref 3.5–5.3)
PROT SERPL-MCNC: 7.3 G/DL (ref 6.4–8.4)
RBC # BLD AUTO: 4.65 MILLION/UL (ref 3.81–5.12)
RSV RNA RESP QL NAA+PROBE: NEGATIVE
SARS-COV-2 RNA RESP QL NAA+PROBE: NEGATIVE
SODIUM SERPL-SCNC: 138 MMOL/L (ref 135–147)
TSH SERPL DL<=0.05 MIU/L-ACNC: 3.18 UIU/ML (ref 0.45–4.5)
WBC # BLD AUTO: 5.66 THOUSAND/UL (ref 4.31–10.16)

## 2024-05-14 PROCEDURE — 99285 EMERGENCY DEPT VISIT HI MDM: CPT | Performed by: EMERGENCY MEDICINE

## 2024-05-14 PROCEDURE — 80053 COMPREHEN METABOLIC PANEL: CPT

## 2024-05-14 PROCEDURE — 96361 HYDRATE IV INFUSION ADD-ON: CPT

## 2024-05-14 PROCEDURE — 83735 ASSAY OF MAGNESIUM: CPT

## 2024-05-14 PROCEDURE — 82948 REAGENT STRIP/BLOOD GLUCOSE: CPT

## 2024-05-14 PROCEDURE — 70450 CT HEAD/BRAIN W/O DYE: CPT

## 2024-05-14 PROCEDURE — 84484 ASSAY OF TROPONIN QUANT: CPT

## 2024-05-14 PROCEDURE — 85025 COMPLETE CBC W/AUTO DIFF WBC: CPT

## 2024-05-14 PROCEDURE — 99285 EMERGENCY DEPT VISIT HI MDM: CPT

## 2024-05-14 PROCEDURE — 96374 THER/PROPH/DIAG INJ IV PUSH: CPT

## 2024-05-14 PROCEDURE — 0241U HB NFCT DS VIR RESP RNA 4 TRGT: CPT

## 2024-05-14 PROCEDURE — 84443 ASSAY THYROID STIM HORMONE: CPT

## 2024-05-14 PROCEDURE — 36415 COLL VENOUS BLD VENIPUNCTURE: CPT

## 2024-05-14 PROCEDURE — 93005 ELECTROCARDIOGRAM TRACING: CPT

## 2024-05-14 RX ORDER — MECLIZINE HYDROCHLORIDE 25 MG/1
25 TABLET ORAL 3 TIMES DAILY PRN
Qty: 30 TABLET | Refills: 0 | Status: SHIPPED | OUTPATIENT
Start: 2024-05-14

## 2024-05-14 RX ORDER — HYDROXYZINE HYDROCHLORIDE 25 MG/1
25 TABLET, FILM COATED ORAL ONCE
Status: COMPLETED | OUTPATIENT
Start: 2024-05-14 | End: 2024-05-14

## 2024-05-14 RX ORDER — ONDANSETRON 2 MG/ML
4 INJECTION INTRAMUSCULAR; INTRAVENOUS ONCE
Status: COMPLETED | OUTPATIENT
Start: 2024-05-14 | End: 2024-05-14

## 2024-05-14 RX ORDER — MECLIZINE HCL 12.5 MG/1
50 TABLET ORAL ONCE
Status: COMPLETED | OUTPATIENT
Start: 2024-05-14 | End: 2024-05-14

## 2024-05-14 RX ADMIN — HYDROXYZINE HYDROCHLORIDE 25 MG: 25 TABLET ORAL at 09:51

## 2024-05-14 RX ADMIN — SODIUM CHLORIDE 1000 ML: 0.9 INJECTION, SOLUTION INTRAVENOUS at 09:51

## 2024-05-14 RX ADMIN — MECLIZINE 50 MG: 12.5 TABLET ORAL at 10:30

## 2024-05-14 RX ADMIN — ONDANSETRON 4 MG: 2 INJECTION INTRAMUSCULAR; INTRAVENOUS at 09:52

## 2024-05-14 NOTE — ED PROVIDER NOTES
History  Chief Complaint   Patient presents with    Dizziness     Patient woke this am at 0700 feeling dizzy nauseous SOB and diaphoretic. Drank juice at home with no relief. Denies palpitations and history of low blood sugar.        Patient is a 63-year-old female with history of Crohn's colitis with no recent flares and presents to the emergency department after awakening today feeling flushed, lightheaded, tremulous, and nauseated.  She denies any vomiting and reports that her lightheadedness is worse upon standing.  She denies any vertigo sensation.  She reports that she has otherwise been well recently.  She has had no fevers to this point and no other infectious symptoms such as cough, burning with urination, diarrhea.  She has no history of cardiac conditions.  She does have a history of anxiety and panic attacks but reports that this feels nothing like her normal panic attack.  She denies any chest pain, shortness of breath, abdominal pain.  She denies any other recent illnesses or injuries and reports feeling well yesterday prior to going to bed.        Prior to Admission Medications   Prescriptions Last Dose Informant Patient Reported? Taking?   Linzess 145 MCG CAPS   Yes No   Sig: Take 145 mcg by mouth daily   Multiple Vitamins-Minerals (multivitamin with minerals) tablet  Self Yes No   Sig: Take 1 tablet by mouth daily   amphetamine-dextroamphetamine (ADDERALL) 10 mg tablet   No No   Sig: Take 1 tablet (10 mg total) by mouth 2 (two) times a day Max Daily Amount: 20 mg   aspirin (ECOTRIN LOW STRENGTH) 81 mg EC tablet  Self Yes No   Si mg   atorvastatin (LIPITOR) 10 mg tablet   No No   Sig: TAKE 1 TABLET(10 MG) BY MOUTH DAILY   escitalopram (Lexapro) 10 mg tablet   No No   Sig: Take 0.5 tablets (5 mg total) by mouth daily for 7 days, THEN 1 tablet (10 mg total) daily.   hydrOXYzine HCL (ATARAX) 25 mg tablet   No No   Sig: Take 1 tablet (25mg) up to three times daily as needed for anxiety and panic  symptoms.   levothyroxine 100 mcg tablet   No No   Sig: TAKE 1 TABLET(100 MCG) BY MOUTH DAILY EARLY MORNING   mesalamine (LIALDA) 1.2 g EC tablet  Self Yes No   Sig: Take 2 tablets by mouth in the morning   nicotine (NICODERM CQ) 21 mg/24 hr TD 24 hr patch  Self No No   Sig: Place 1 patch on the skin over 24 hours every 24 hours   traZODone (DESYREL) 100 mg tablet   No No   Sig: TAKE 1 TABLET(100 MG) BY MOUTH DAILY AT BEDTIME AS NEEDED FOR SLEEP      Facility-Administered Medications: None       Past Medical History:   Diagnosis Date    Anxiety     Anxiety     Bunion     Colon polyp     Difficulty walking     Panic attacks     Thyroid disease     Visual impairment        Past Surgical History:   Procedure Laterality Date     SECTION      COLONOSCOPY      HYSTERECTOMY      TUBAL LIGATION  82       Family History   Problem Relation Age of Onset    Lung cancer Mother     Cancer Mother     Hypothyroidism Daughter     Thyroid disease Daughter     Ovarian cancer Maternal Grandmother     Glaucoma Paternal Grandmother     Substance Abuse Brother     Asthma Son     Alcohol abuse Neg Hx     Depression Neg Hx     Drug abuse Neg Hx     Diabetes Neg Hx     Hypertension Neg Hx     Heart disease Neg Hx     Stroke Neg Hx      I have reviewed and agree with the history as documented.    E-Cigarette/Vaping    E-Cigarette Use Current Every Day User      E-Cigarette/Vaping Substances    Nicotine Yes     THC No     CBD No     Flavoring No     Other No      Social History     Tobacco Use    Smoking status: Former     Current packs/day: 0.50     Average packs/day: 0.5 packs/day for 45.0 years (22.5 ttl pk-yrs)     Types: Cigarettes     Start date: 1980    Smokeless tobacco: Never    Tobacco comments:     since age 16   Vaping Use    Vaping status: Every Day    Substances: Nicotine   Substance Use Topics    Alcohol use: Yes     Alcohol/week: 1.0 standard drink of alcohol     Types: 1 Glasses of wine per week      Comment: Rare- 1 every few weeks     Drug use: Yes     Types: Marijuana     Comment: coccaine use 1978 as per allscripts        Review of Systems   Constitutional:  Positive for chills (intermittent) and fatigue. Negative for fever.        Flushed sensation   HENT:  Negative for congestion, ear pain and sore throat.    Eyes:  Negative for visual disturbance.   Respiratory:  Negative for cough and shortness of breath.    Cardiovascular:  Negative for chest pain and palpitations.   Gastrointestinal:  Positive for nausea. Negative for abdominal pain, constipation, diarrhea and vomiting.   Genitourinary:  Negative for dysuria and hematuria.   Musculoskeletal:  Negative for arthralgias and back pain.   Skin:  Negative for color change and rash.   Neurological:  Positive for dizziness, tremors, weakness (Generalized) and light-headedness. Negative for seizures, syncope and headaches.        Denying vertigo sensation   All other systems reviewed and are negative.      Physical Exam  ED Triage Vitals   Temperature Pulse Respirations Blood Pressure SpO2   05/14/24 0925 05/14/24 0921 05/14/24 0921 05/14/24 0921 05/14/24 0921   97.5 °F (36.4 °C) 56 20 (!) 183/96 100 %      Temp Source Heart Rate Source Patient Position - Orthostatic VS BP Location FiO2 (%)   05/14/24 0921 05/14/24 0921 05/14/24 0921 05/14/24 0921 --   Oral Monitor Lying Right arm       Pain Score       05/14/24 0939       No Pain             Orthostatic Vital Signs  Vitals:    05/14/24 0950 05/14/24 0951 05/14/24 1000 05/14/24 1030   BP: (!) 189/91 (!) 179/91 167/93 152/74   Pulse: (!) 45 (!) 50 (!) 44 (!) 51   Patient Position - Orthostatic VS: Standing for 3 minutes - Orthostatic VS Sitting - Orthostatic VS         Physical Exam  Vitals and nursing note reviewed.   Constitutional:       Appearance: Normal appearance. She is well-developed. She is not ill-appearing.   HENT:      Head: Normocephalic and atraumatic.      Right Ear: External ear normal.       Left Ear: External ear normal.      Mouth/Throat:      Pharynx: Oropharynx is clear. No oropharyngeal exudate or posterior oropharyngeal erythema.   Eyes:      General:         Right eye: No discharge.         Left eye: No discharge.      Extraocular Movements: Extraocular movements intact.      Conjunctiva/sclera: Conjunctivae normal.      Pupils: Pupils are unequal (Left pupil 3+, right pupil 2+).   Cardiovascular:      Rate and Rhythm: Normal rate and regular rhythm.      Pulses: Normal pulses.      Heart sounds: Normal heart sounds. No murmur heard.  Pulmonary:      Effort: Pulmonary effort is normal. No respiratory distress.      Breath sounds: Normal breath sounds. No wheezing, rhonchi or rales.   Abdominal:      General: Abdomen is flat.      Palpations: Abdomen is soft.      Tenderness: There is no abdominal tenderness. There is no guarding or rebound.   Musculoskeletal:         General: No swelling or deformity. Normal range of motion.      Cervical back: Neck supple. No tenderness.   Skin:     General: Skin is warm and dry.      Capillary Refill: Capillary refill takes less than 2 seconds.   Neurological:      Mental Status: She is alert and oriented to person, place, and time.      Cranial Nerves: No cranial nerve deficit.      Sensory: No sensory deficit.      Motor: No weakness.      Coordination: Coordination normal.      Gait: Gait normal.         ED Medications  Medications   ondansetron (ZOFRAN) injection 4 mg (4 mg Intravenous Given 5/14/24 0952)   sodium chloride 0.9 % bolus 1,000 mL (0 mL Intravenous Stopped 5/14/24 1246)   hydrOXYzine HCL (ATARAX) tablet 25 mg (25 mg Oral Given 5/14/24 0951)   meclizine (ANTIVERT) tablet 50 mg (50 mg Oral Given 5/14/24 1030)       Diagnostic Studies  Results Reviewed       Procedure Component Value Units Date/Time    TSH, 3rd generation with Free T4 reflex [[id number]]  (Normal) Collected: 05/14/24 0940    Lab Status: Final result Specimen: Blood from Arm, Left  Updated: 05/14/24 1116     TSH 3RD GENERATON 3.183 uIU/mL     HS Troponin 0hr (reflex protocol) [[id number]]  (Normal) Collected: 05/14/24 0940    Lab Status: Final result Specimen: Blood from Arm, Left Updated: 05/14/24 1034     hs TnI 0hr 3 ng/L     Comprehensive metabolic panel [[id number]] Collected: 05/14/24 0940    Lab Status: Final result Specimen: Blood from Arm, Left Updated: 05/14/24 1029     Sodium 138 mmol/L      Potassium 4.2 mmol/L      Chloride 106 mmol/L      CO2 26 mmol/L      ANION GAP 6 mmol/L      BUN 17 mg/dL      Creatinine 0.81 mg/dL      Glucose 83 mg/dL      Calcium 9.3 mg/dL      AST 17 U/L      ALT 16 U/L      Alkaline Phosphatase 65 U/L      Total Protein 7.3 g/dL      Albumin 4.2 g/dL      Total Bilirubin 0.35 mg/dL      eGFR 76 ml/min/1.73sq m     Narrative:      National Kidney Disease Foundation guidelines for Chronic Kidney Disease (CKD):     Stage 1 with normal or high GFR (GFR > 90 mL/min/1.73 square meters)    Stage 2 Mild CKD (GFR = 60-89 mL/min/1.73 square meters)    Stage 3A Moderate CKD (GFR = 45-59 mL/min/1.73 square meters)    Stage 3B Moderate CKD (GFR = 30-44 mL/min/1.73 square meters)    Stage 4 Severe CKD (GFR = 15-29 mL/min/1.73 square meters)    Stage 5 End Stage CKD (GFR <15 mL/min/1.73 square meters)  Note: GFR calculation is accurate only with a steady state creatinine    Magnesium [[id number]]  (Abnormal) Collected: 05/14/24 0940    Lab Status: Final result Specimen: Blood from Arm, Left Updated: 05/14/24 1029     Magnesium 1.8 mg/dL     FLU/RSV/COVID - if FLU/RSV clinically relevant [[id number]]  (Normal) Collected: 05/14/24 0940    Lab Status: Final result Specimen: Nares from Nose Updated: 05/14/24 1027     SARS-CoV-2 Negative     INFLUENZA A PCR Negative     INFLUENZA B PCR Negative     RSV PCR Negative    Narrative:      FOR PEDIATRIC PATIENTS - copy/paste COVID Guidelines URL to browser:  [email or web address]    SARS-CoV-2 assay is a Nucleic Acid Amplification assay intended for the  qualitative detection of nucleic acid from SARS-CoV-2 in nasopharyngeal  swabs. Results are for the presumptive identification of SARS-CoV-2 RNA.    Positive results are indicative of infection with SARS-CoV-2, the virus  causing COVID-19, but do not rule out bacterial infection or co-infection  with other viruses. Laboratories within the [place] and its  territories are required to report all positive results to the appropriate  public health authorities. Negative results do not preclude SARS-CoV-2  infection and should not be used as the sole basis for treatment or other  patient management decisions. Negative results must be combined with  clinical observations, patient history, and epidemiological information.  This test has not been FDA cleared or approved.    This test has been authorized by FDA under an Emergency Use Authorization  (EUA). This test is only authorized for the duration of time the  declaration that circumstances exist justifying the authorization of the  emergency use of an in vitro diagnostic tests for detection of SARS-CoV-2  virus and/or diagnosis of COVID-19 infection under section 564(b)(1) of  the Act, 21 U.S.C. 360bbb-3(b)(1), unless the authorization is terminated  or revoked sooner. The test has been validated but independent review by FDA  and CLIA is pending.    Test performed using [employer] GeneXpert: This RT-PCR assay targets N2,  a region unique to SARS-CoV-2. A conserved region in the E-gene was chosen  for pan-Sarbecovirus detection which includes SARS-CoV-2.    According to CMS-2020-01-R, this platform meets the definition of high-throughput technology.    CBC and differential [[id number]] Collected: 05/14/24 0940    Lab Status: Final result Specimen: Blood from Arm, Left Updated: 05/14/24 1009     WBC 5.66 Thousand/uL       RBC 4.65 Million/uL      Hemoglobin 12.8 g/dL      Hematocrit 39.0 %      MCV 84 fL      MCH 27.5 pg      MCHC 32.8 g/dL      RDW 14.7 %      MPV 9.7 fL      Platelets 247 Thousands/uL      nRBC 0 /100 WBCs      Segmented % 48 %      Immature Grans % 0 %      Lymphocytes % 40 %      Monocytes % 9 %      Eosinophils Relative 3 %      Basophils Relative 0 %      Absolute Neutrophils 2.71 Thousands/µL      Absolute Immature Grans 0.01 Thousand/uL      Absolute Lymphocytes 2.28 Thousands/µL      Absolute Monocytes 0.49 Thousand/µL      Eosinophils Absolute 0.15 Thousand/µL      Basophils Absolute 0.02 Thousands/µL     Fingerstick Glucose (POCT) [[id number]]  (Normal) Collected: 05/14/24 0919    Lab Status: Final result Specimen: Blood Updated: 05/14/24 0923     POC Glucose 94 mg/dl                    CT head without contrast   Final Result by Edgar Simeon MD (05/14 1215)      No acute intracranial abnormality.                  Workstation performed: [id number]               Procedures  ECG 12 Lead Documentation Only    Date/Time: 5/14/2024 9:30 AM    Performed by: Mark Walton DO  Authorized by: Mark Walton DO    Indications / Diagnosis:  Lightheadedness  ECG reviewed by me, the ED Provider: yes    Patient location:  ED  Previous ECG:     Previous ECG:  Compared to current    Similarity:  No change  Interpretation:     Interpretation: normal    Quality:     Tracing quality:  Limited by artifact  Rate:     ECG rate:  59    ECG rate assessment: bradycardic    Rhythm:     Rhythm: sinus bradycardia    Ectopy:     Ectopy: none    QRS:     QRS axis:  Normal    QRS intervals:  Normal  Conduction:     Conduction: normal    ST segments:     ST segments:  Normal  T waves:     T waves: normal    Comments:      Heart rate consistent with patient's baseline heart rate when compared to previous ECGs.        ED Course                                       Medical Decision Making  63F here with dizzy sensation,  nausea.    On physical exam, patient has normal neurologic exam.  She is a little unsteady on her feet initially.    DDx including but not limited to: metabolic abnormality, cardiac abnormality, thyroid disease, intracranial process, adverse reaction; doubt infectious process.     CBC, CMP, COVID/flu/RSV, magnesium, high-sensitivity troponin, TSH all within normal limits other than very mild hypomagnesemia.    Patient's ECG without concerning findings of acute ischemia or dysrhythmia.    CT of the head without acute intracranial process.    Patient reports resolution of symptoms after fluids and meclizine administration.  I personally witnessed patient ambulating in the room after meclizine administration and she reports generally feeling better.  She still feels a little shaky but is able to ambulate independently.    Patient is noted to be bradycardic, however upon review of patient's prior ECGs, patient's heart rate is at its baseline at this time.    Given resolution of symptoms with meclizine, most suspicious for vertigo.  More meclizine sent to the patient's pharmacy and recommend she follow-up with her PCP as soon as possible.  Return precautions discussed.    In the absence of additional concerning findings on physical exam, laboratory evaluation, ecg, or imaging patient is appropriate for discharge at this time.  Patient provided with informational handout that discusses symptom management and reasons to return to the emergency department.  Return precautions are discussed and the patient and/or family demonstrate understanding of the plan.  Their questions are all answered to their satisfaction and the patient is discharged.      Amount and/or Complexity of Data Reviewed  Labs: ordered.  Radiology: ordered.    Risk  Prescription drug management.          Disposition  Final diagnoses:   Vertigo   Bradycardia     Time reflects when diagnosis was documented in both MDM as applicable and the Disposition within  this note       Time User Action Codes Description Comment    5/14/2024 12:18 PM Mark Walton [R42] Vertigo     5/14/2024 12:18 PM Mark Walton [R00.1] Bradycardia           ED Disposition       ED Disposition   Discharge    Condition   Stable    Date/Time   Tue May 14, 2024 12:18 PM    Comment   Jeri Acosta discharge to home/self care.                   Follow-up Information       Follow up With Specialties Details Why Contact Info    Bhavna Tello MD Internal Medicine Schedule an appointment as soon as possible for a visit   [address] [address]  [phone]              Discharge Medication List as of 5/14/2024 12:19 PM        START taking these medications    Details   meclizine (ANTIVERT) 25 mg tablet Take 1 tablet (25 mg total) by mouth 3 (three) times a day as needed for dizziness, Starting Tue 5/14/2024, Normal           CONTINUE these medications which have NOT CHANGED    Details   amphetamine-dextroamphetamine (ADDERALL) 10 mg tablet Take 1 tablet (10 mg total) by mouth 2 (two) times a day Max Daily Amount: 20 mg, Starting Tue 4/2/2024, Until Thu 5/2/2024, Normal      aspirin (ECOTRIN LOW STRENGTH) 81 mg EC tablet 81 mg, Starting Wed 2/22/2023, Historical Med      atorvastatin (LIPITOR) 10 mg tablet TAKE 1 TABLET(10 MG) BY MOUTH DAILY, Starting Wed 11/22/2023, Normal      escitalopram (Lexapro) 10 mg tablet Multiple Dosages:Starting Mon 12/11/2023, Until Sun 12/17/2023 at 2359, THEN Starting Mon 12/18/2023, Until Sat 3/16/2024 at 2359Take 0.5 tablets (5 mg total) by mouth daily for 7 days, THEN 1 tablet (10 mg total) daily., Normal      hydrOXYzine HCL (ATARAX) 25 mg tablet Take 1 tablet (25mg) up to three times daily as needed for anxiety and panic symptoms., Normal      levothyroxine 100 mcg tablet TAKE 1 TABLET(100 MCG) BY MOUTH DAILY EARLY MORNING, Normal      Linzess 145 MCG CAPS Take 145 mcg by mouth daily, Starting Tue 1/23/2024, Historical Med       mesalamine (LIALDA) 1.2 g EC tablet Take 2 tablets by mouth in the morning, Starting Thu 7/27/2023, Historical Med      Multiple Vitamins-Minerals (multivitamin with minerals) tablet Take 1 tablet by mouth daily, Historical Med      nicotine (NICODERM CQ) 21 mg/24 hr TD 24 hr patch Place 1 patch on the skin over 24 hours every 24 hours, Starting Wed 8/9/2023, Normal      traZODone (DESYREL) 100 mg tablet TAKE 1 TABLET(100 MG) BY MOUTH DAILY AT BEDTIME AS NEEDED FOR SLEEP, Starting Fri 5/10/2024, Normal           No discharge procedures on file.    PDMP Review         Value Time User    PDMP Reviewed  Yes 4/2/2024  3:32 PM Britany Lozano MD             ED Provider  Attending physically available and evaluated Jeri Acosta. I managed the patient along with the ED Attending.    Electronically Signed by           Mark Walton DO  05/14/24 [id number]

## 2024-05-14 NOTE — TELEPHONE ENCOUNTER
Left third message on machine and sent [employer] message and adv'd pt. that Dr. Tello will not be refiilling her meds that her Psychiatrist prescribes and to continue seeing her Psychiatrist for meds and that her appt. for 5/20 is cancelled per Dr. Tello.

## 2024-05-15 LAB
ATRIAL RATE: 59 BPM
P AXIS: 50 DEGREES
PR INTERVAL: 160 MS
QRS AXIS: 80 DEGREES
QRSD INTERVAL: 90 MS
QT INTERVAL: 438 MS
QTC INTERVAL: 433 MS
T WAVE AXIS: 83 DEGREES
VENTRICULAR RATE: 59 BPM

## 2024-05-15 PROCEDURE — 93010 ELECTROCARDIOGRAM REPORT: CPT | Performed by: INTERNAL MEDICINE

## 2024-05-16 DIAGNOSIS — E78.00 PURE HYPERCHOLESTEROLEMIA: ICD-10-CM

## 2024-05-16 RX ORDER — ATORVASTATIN CALCIUM 10 MG/1
10 TABLET, FILM COATED ORAL DAILY
Qty: 90 TABLET | Refills: 1 | Status: SHIPPED | OUTPATIENT
Start: 2024-05-16

## 2024-05-16 NOTE — ED ATTENDING ATTESTATION
5/14/2024  I, Sandro Dhillon DO, saw and evaluated the patient. I have discussed the patient with the resident/non-physician practitioner and agree with the resident's/non-physician practitioner's findings, Plan of Care, and MDM as documented in the resident's/non-physician practitioner's note, except where noted. All available labs and Radiology studies were reviewed.  I was present for key portions of any procedure(s) performed by the resident/non-physician practitioner and I was immediately available to provide assistance.       At this point I agree with the current assessment done in the Emergency Department.  I have conducted an independent evaluation of this patient a history and physical is as follows:    ED Course         Critical Care Time  Procedures

## 2024-05-23 ENCOUNTER — TELEPHONE (OUTPATIENT)
Dept: INTERNAL MEDICINE CLINIC | Facility: CLINIC | Age: 64
End: 2024-05-23

## 2024-05-23 NOTE — TELEPHONE ENCOUNTER
Please call patient.    Ask how she is feeling, if dizziness is all better.  Seen at the ER last week.

## 2024-05-28 NOTE — TELEPHONE ENCOUNTER
Left message for patient to call back.    Staff: OK to relay message    Also sent [employer]

## 2024-07-31 ENCOUNTER — OFFICE VISIT (OUTPATIENT)
Dept: FAMILY MEDICINE CLINIC | Facility: CLINIC | Age: 64
End: 2024-07-31

## 2024-07-31 VITALS
DIASTOLIC BLOOD PRESSURE: 82 MMHG | BODY MASS INDEX: 35.33 KG/M2 | WEIGHT: 192 LBS | OXYGEN SATURATION: 97 % | HEART RATE: 65 BPM | SYSTOLIC BLOOD PRESSURE: 128 MMHG | HEIGHT: 62 IN

## 2024-07-31 DIAGNOSIS — R51.9 FREQUENT HEADACHES: ICD-10-CM

## 2024-07-31 DIAGNOSIS — F17.211 CIGARETTE NICOTINE DEPENDENCE IN REMISSION: ICD-10-CM

## 2024-07-31 DIAGNOSIS — F41.1 GENERALIZED ANXIETY DISORDER WITH PANIC ATTACKS: ICD-10-CM

## 2024-07-31 DIAGNOSIS — K52.9 COLITIS: ICD-10-CM

## 2024-07-31 DIAGNOSIS — G47.00 INSOMNIA: ICD-10-CM

## 2024-07-31 DIAGNOSIS — K51.00 ULCERATIVE PANCOLITIS WITHOUT COMPLICATION (HCC): Primary | ICD-10-CM

## 2024-07-31 DIAGNOSIS — K59.04 CHRONIC IDIOPATHIC CONSTIPATION: ICD-10-CM

## 2024-07-31 DIAGNOSIS — E03.9 ACQUIRED HYPOTHYROIDISM: ICD-10-CM

## 2024-07-31 DIAGNOSIS — F33.1 MAJOR DEPRESSIVE DISORDER, RECURRENT EPISODE, MODERATE (HCC): ICD-10-CM

## 2024-07-31 DIAGNOSIS — F90.2 ADHD (ATTENTION DEFICIT HYPERACTIVITY DISORDER), COMBINED TYPE: ICD-10-CM

## 2024-07-31 DIAGNOSIS — H57.9 EYE PRESSURE: ICD-10-CM

## 2024-07-31 DIAGNOSIS — H04.203 EYE TEARING, BILATERAL: ICD-10-CM

## 2024-07-31 DIAGNOSIS — F41.0 GENERALIZED ANXIETY DISORDER WITH PANIC ATTACKS: ICD-10-CM

## 2024-07-31 PROBLEM — M79.89 LEG SWELLING: Status: RESOLVED | Noted: 2023-02-01 | Resolved: 2024-07-31

## 2024-07-31 PROBLEM — R00.2 PALPITATIONS: Status: ACTIVE | Noted: 2023-02-22

## 2024-07-31 PROCEDURE — 99204 OFFICE O/P NEW MOD 45 MIN: CPT | Performed by: FAMILY MEDICINE

## 2024-07-31 RX ORDER — LEVOTHYROXINE SODIUM 0.1 MG/1
TABLET ORAL
Qty: 90 TABLET | Refills: 1 | Status: SHIPPED | OUTPATIENT
Start: 2024-07-31

## 2024-07-31 RX ORDER — ESCITALOPRAM OXALATE 10 MG/1
TABLET ORAL
Qty: 60 TABLET | Refills: 0 | Status: SHIPPED | OUTPATIENT
Start: 2024-07-31 | End: 2024-09-06

## 2024-07-31 RX ORDER — TRAZODONE HYDROCHLORIDE 100 MG/1
100 TABLET ORAL
Qty: 90 TABLET | Refills: 0 | Status: SHIPPED | OUTPATIENT
Start: 2024-07-31

## 2024-07-31 RX ORDER — PREDNISONE 10 MG/1
TABLET ORAL
Qty: 30 TABLET | Refills: 0 | Status: SHIPPED | OUTPATIENT
Start: 2024-07-31 | End: 2024-08-12

## 2024-07-31 NOTE — PROGRESS NOTES
New Patient Outpatient Note    HPI:     Jeri Acosta, 64 y.o. female  presents today as a new patient and to establish care.  The patient is shifting laterally within [facility].  She has significant history of ulcerative colitis which she is dealing with over the course of the last year.  She has been having multiple flares, although she is not bleeding like her initial flare when they did a colonoscopy and found 12 bleeding ulcers, she is having sudden pain that is random but usually is in the lower left quadrant or upper right quadrant.  Having the diagnosis of ulcerative colitis she is not on any other medication outside of mesalamine.  She has been taking this with flares over the course of the last year with minimal benefit.  Patient also has difficulty with bowel movements on a regular basis, she can take Linzess, but the effectiveness of this medication has also dwindled.    We discussed several other issues as well.  The patient continues to vape, previously she used tobacco products.  She is looking to reduce her smoking, but is not ready.  Since she is still technically smoking, and has a history of at least 20 pack years, she qualifies for lung cancer screening CT.  She is interested in this since her mother passed from lung cancer and was a smoker as well.    Previously she had discontinued Lexapro.  Her  who presents with her states that she would likely benefit from being on this medication.  She has a lot of anxiety and insomnia.  This may help to reduce her racing thoughts at night, help her sleep along with trazodone, and also help her during the day to reduce stress.    Family Hx  UTD in chart      Past Medical History:   Diagnosis Date    Anxiety     Anxiety     Bunion 2022    Colon polyp     Difficulty walking     Leg swelling 02/01/2023    Mood disorder (HCC) 08/22/2014    Panic attacks     Thyroid disease     Visual impairment         Past Surgical History:   Procedure Laterality  Date     SECTION      COLONOSCOPY      HYSTERECTOMY      TUBAL LIGATION  82          Current Outpatient Medications:     aspirin (ECOTRIN LOW STRENGTH) 81 mg EC tablet, 81 mg, Disp: , Rfl:     atorvastatin (LIPITOR) 10 mg tablet, TAKE 1 TABLET(10 MG) BY MOUTH DAILY, Disp: 90 tablet, Rfl: 1    escitalopram (Lexapro) 10 mg tablet, Take 0.5 tablets (5 mg total) by mouth daily for 7 days, THEN 1 tablet (10 mg total) daily., Disp: 60 tablet, Rfl: 0    hydrOXYzine HCL (ATARAX) 25 mg tablet, Take 1 tablet (25mg) up to three times daily as needed for anxiety and panic symptoms., Disp: 90 tablet, Rfl: 3    levothyroxine 100 mcg tablet, Take 1 tablet, 100 mcg daily except for Tuesday, take 200 mg or 2 tablets, Disp: 90 tablet, Rfl: 1    Linzess 145 MCG CAPS, Take 145 mcg by mouth daily, Disp: , Rfl:     mesalamine (LIALDA) 1.2 g EC tablet, Take 2 tablets by mouth in the morning, Disp: , Rfl:     Multiple Vitamins-Minerals (multivitamin with minerals) tablet, Take 1 tablet by mouth daily, Disp: , Rfl:     predniSONE 10 mg tablet, Take 4 tablets (40 mg total) by mouth daily for 3 days, THEN 3 tablets (30 mg total) daily for 3 days, THEN 2 tablets (20 mg total) daily for 3 days, THEN 1 tablet (10 mg total) daily for 3 days., Disp: 30 tablet, Rfl: 0    traZODone (DESYREL) 100 mg tablet, Take 1 tablet (100 mg total) by mouth daily at bedtime as needed for sleep, Disp: 90 tablet, Rfl: 0    amphetamine-dextroamphetamine (ADDERALL) 10 mg tablet, Take 1 tablet (10 mg total) by mouth 2 (two) times a day Max Daily Amount: 20 mg, Disp: 60 tablet, Rfl: 0     SOCIAL:   Rent/Own:  Renting  Currently living with: , Son, grandchildren  Stable food: Yes  Safe At Home: Yes  Hobbies: Cooking  Profession/ employment: retired  Restriction to medical procedures:  none    SEXUAL HISTORY:   Preference:  Men  Sexually Active:  Yes  Birth Control:  Hysterectomy, Post menopausal    Psychological History  Psychiatric history:   Anxiety, FRANCO, depression  History of inpatient:  none  Current Therapy/ Provider:  None  Current Medications:  Lexapro    Substance History  Smoking: vaping, Former cigs  Alcohol Use: 1 drink per week  Substance use:  Marijuana 7 times per week.     ROS:   Review of Systems   Constitutional:  Negative for chills and fever.   HENT:  Negative for congestion, ear discharge, ear pain, hearing loss, postnasal drip, rhinorrhea, sinus pressure, sinus pain and sore throat.    Eyes:  Positive for visual disturbance (wears glasses).   Respiratory:  Positive for cough (after smoking marijuana), chest tightness and shortness of breath.    Cardiovascular:  Positive for palpitations (occasional). Negative for chest pain.   Gastrointestinal:  Positive for abdominal pain and constipation. Negative for diarrhea, nausea and vomiting.   Genitourinary:  Negative for dysuria and frequency.   Skin:  Negative for rash and wound.   Neurological:  Positive for dizziness (imbalance). Negative for light-headedness and headaches.   Psychiatric/Behavioral:  Positive for dysphoric mood. Negative for self-injury and suicidal ideas. The patient is nervous/anxious.       OBJECTIVE  Vitals:    07/31/24 1511   BP: 128/82   Pulse: 65   SpO2: 97%      Physical Exam  Constitutional:       General: She is not in acute distress.     Appearance: Normal appearance. She is obese. She is not ill-appearing, toxic-appearing or diaphoretic.   HENT:      Head: Normocephalic and atraumatic.      Right Ear: Tympanic membrane, ear canal and external ear normal. There is no impacted cerumen.      Left Ear: Tympanic membrane, ear canal and external ear normal. There is no impacted cerumen.      Nose: Nose normal. No congestion or rhinorrhea.      Mouth/Throat:      Mouth: Mucous membranes are moist.      Pharynx: Oropharynx is clear. No oropharyngeal exudate or posterior oropharyngeal erythema.   Eyes:      General:         Right eye: No discharge.         Left eye:  No discharge.      Pupils: Pupils are equal, round, and reactive to light.      Comments: Mild injection bilaterally. Mild erythema   Cardiovascular:      Rate and Rhythm: Normal rate and regular rhythm.      Heart sounds: Normal heart sounds. No murmur heard.     No friction rub. No gallop.   Pulmonary:      Effort: Pulmonary effort is normal. No respiratory distress.      Breath sounds: Normal breath sounds. No stridor. No wheezing, rhonchi or rales.      Comments: Decreased air movement globally.  No prolonged expiratory phase.   Abdominal:      General: Bowel sounds are normal. There is no distension.      Palpations: Abdomen is soft.      Tenderness: There is no abdominal tenderness.   Skin:     General: Skin is warm.      Capillary Refill: Capillary refill takes less than 2 seconds.   Neurological:      Mental Status: She is alert.      Sensory: No sensory deficit (Light touch present in all 4 extremities).      Motor: No weakness (5/5 in all 4 extremities).      Deep Tendon Reflexes: Reflexes normal (2/4, intact, C5, C6, L4, S1).            ASSESSMENT AND PLAN   Jeri was seen today for establish care.  Diagnoses and all orders for this visit:    Ulcerative pancolitis without complication (HCC)  Colitis  I believe patient has been going through a prolonged flare of her ulcerative colitis.  She was previously diagnosed with a colonoscopy.  Unfortunately she has been dealing with this pain for some time.  Due to the flare or pain, will attempt prednisone taper to help with reduction and inflammation.  Also she should reach out to her GI physician to determine if any Biologics might be helpful since Linzess has not been as effective in treating her symptoms recently.  -     predniSONE 10 mg tablet; Take 4 tablets (40 mg total) by mouth daily for 3 days, THEN 3 tablets (30 mg total) daily for 3 days, THEN 2 tablets (20 mg total) daily for 3 days, THEN 1 tablet (10 mg total) daily for 3 days.    Chronic idiopathic  constipation  Discussed options for idiopathic constipation.  Recommended Dulcolax 100 mg 3 times a day until stools are essentially diarrhea or very loose.  She may back off on the medication to 2 tablets and then to 1 depending on how well this works, she may take this daily or as needed for constipation.    Acquired hypothyroidism  Patient has acquired hypothyroidism.  Patient has a recent TSH in May 2024.  Will continue with levothyroxine 100 mcg daily.  She should take this along without food or water for at least an hour before or if taking after food, at least 2 hours.  -     levothyroxine 100 mcg tablet; Take 1 tablet, 100 mcg daily except for Tuesday, take 200 mg or 2 tablets    ADHD (attention deficit hyperactivity disorder), combined type  Generalized anxiety disorder with panic attacks  Insomnia  Major depressive disorder, recurrent episode, moderate (HCC)  It does seem that patient has some underlying psychiatric concerns.  She is interested in restarting Lexapro 5 mg daily.  May need to increase this over time depending on her benefit.  She also is using trazodone 100 mg for sleep.  She has been utilizing her 's medication, therefore I have prescribed her own.  -     escitalopram (Lexapro) 10 mg tablet; Take 0.5 tablets (5 mg total) by mouth daily for 7 days, THEN 1 tablet (10 mg total) daily.  -     traZODone (DESYREL) 100 mg tablet; Take 1 tablet (100 mg total) by mouth daily at bedtime as needed for sleep    Eye tearing, bilateral  Eye pressure  Frequent headaches  Patient having increased pressure in her left eye especially, with tearing bilaterally.  She also mentions headaches frequently.  This may be secondary to some other underlying issues, but will have her see ophthalmology to rule out any glaucoma or increased ocular pressure.  -     Ambulatory Referral to Ophthalmology; Future    Cigarette nicotine dependence in remission  Patient has a history of long-term nicotine use and smoking.   She is actively vaping.  I recommended follow-up CT lung cancer screening program to ensure continued monitoring.  -     CT lung screening program; Future               DO Rivka Kat [facility]  8/1/2024 1:51 PM

## 2024-07-31 NOTE — PATIENT INSTRUCTIONS
Please start taking prednisone 40 mg for the next 3 days, then you are going to decrease by 10 mg every 3 days.  So you do 30 mg for 3 days, 20 mg for 3 days, and 10 mg for 3 days.  Please make sure that you are monitoring for the symptoms that we discussed here in the office.  This includes jitteriness, lightheadedness, dizziness, water weight increase, increased hunger.  If for any reason you do not tolerate the medication please stop it immediately and let me know by calling the office.    You were previously on Lexapro.  There are 2 options, you can wait at least 5 or 6 days to start the medication at half the dose you were previously.  Please follow the instructions on the bottle.  Or you can wait until after all the prednisone is done.  This is up to you.    10 the lung CT that I have ordered for you.  This is to help screen for any lung cancer.  This can be done on a yearly basis while you are vaping/smoking.    I have added back the trazodone 100 mg.  Please stick to your prescription.    Please follow-up with the eye physician.  This is to help with the tearing and pressure/headaches that might be secondary to this issue.

## 2024-08-06 ENCOUNTER — TELEPHONE (OUTPATIENT)
Age: 64
End: 2024-08-06

## 2024-08-06 NOTE — TELEPHONE ENCOUNTER
Pt and  were on the line checking about Ct screening apt. It was cx due to only having medicare part A. Pt and  will look into it an call back

## 2024-09-25 ENCOUNTER — OFFICE VISIT (OUTPATIENT)
Dept: FAMILY MEDICINE CLINIC | Facility: CLINIC | Age: 64
End: 2024-09-25

## 2024-09-25 VITALS
HEIGHT: 62 IN | BODY MASS INDEX: 35.51 KG/M2 | DIASTOLIC BLOOD PRESSURE: 80 MMHG | OXYGEN SATURATION: 97 % | SYSTOLIC BLOOD PRESSURE: 128 MMHG | WEIGHT: 193 LBS | HEART RATE: 67 BPM

## 2024-09-25 DIAGNOSIS — R06.09 DOE (DYSPNEA ON EXERTION): Primary | ICD-10-CM

## 2024-09-25 DIAGNOSIS — R07.89 CHEST PRESSURE: ICD-10-CM

## 2024-09-25 DIAGNOSIS — R06.02 SHORTNESS OF BREATH: ICD-10-CM

## 2024-09-25 DIAGNOSIS — R07.89 CHEST TIGHTNESS: ICD-10-CM

## 2024-09-25 PROCEDURE — G2211 COMPLEX E/M VISIT ADD ON: HCPCS | Performed by: FAMILY MEDICINE

## 2024-09-25 PROCEDURE — 99214 OFFICE O/P EST MOD 30 MIN: CPT | Performed by: FAMILY MEDICINE

## 2024-09-25 RX ORDER — ALBUTEROL SULFATE 90 UG/1
2 AEROSOL, METERED RESPIRATORY (INHALATION) EVERY 4 HOURS PRN
Qty: 18 G | Refills: 0 | Status: SHIPPED | OUTPATIENT
Start: 2024-09-25

## 2024-09-25 NOTE — PROGRESS NOTES
Outpatient Note- Follow up     HPI:     Jeri Acosta , 64 y.o. female  presents today initially for lab review.  Thankfully previous labs do not demonstrate any significant abnormalities, her electrolytes, kidney function, liver function, glucose, cell lines, and thyroid were within normal limits in May.  No more recent labs were obtained except through cardiology.  She presents today to discuss a new diagnosis of congestive heart failure.  After reviewing documentation from [facility] cardiology, her proBNP was elevated into the 200s.  She did not have any evidence of significant calcifications of the coronary arteries.  They gave her this diagnosis and told her to start with Jardiance.  I feel that this is likely due to the diuretic effect that Jardiance will have with sugars.  The patient is not diabetic at this time, and I would be concerned about lowering sugars to having other side effects.    The patient continues to have constant chest pressure/tightness.  She does not know why she continues to have the symptoms, but with recent imaging from cardiology, there is no significant stenosis or plaque buildup.  They did not perform a echo or stress test on the patient.  They recommended starting Jardiance 10 mg and following up with labs next week.    Past Medical History:   Diagnosis Date    Anxiety     Anxiety     Bunion 2022    Colon polyp     Difficulty walking     Leg swelling 02/01/2023    Mood disorder (HCC) 08/22/2014    Panic attacks     Thyroid disease     Visual impairment       ROS:   Review of Systems   Patient has continued chest pressure, occasional tightness and pain on the left side, but she denies any diaphoresis, sweating, dizziness, lightheadedness, nausea, vomiting.  She has mild shortness of breath, cough, and more recently has been bringing up small amount of sputum.    OBJECTIVE  Vitals:    09/25/24 0835   BP: 128/80   Pulse: 67   SpO2: 97%        Physical Exam   No PE  performed, majority of the visit was utilized to discuss and review labs/imaging/documentation from cardiology and discussed the pathophysiology of heart failure    ASSESSMENT AND PLAN   Jeri was seen today for follow-up.  Diagnoses and all orders for this visit:    CASAS (dyspnea on exertion)  Shortness of breath  Chest tightness  Chest pressure  Patient presents today with continued chest pressure, tightness, and dyspnea on exertion.  This may be multifactorial, but will need to evaluate for new diagnosis of heart failure.  Echocardiogram will evaluate for valvular disease and/or decreased EF.  Stress test was ordered due to constant pressure and pain, along with her dyspnea on exertion that she is experiencing.  There is likely a component of associated with her lungs and she is a current smoker and has a greater than 40-year pack history.  Because of this I have started her on albuterol.  This may help with the new onset cough and mucus production if it is a possible exacerbation associated with smoking.  She is to continue to monitor symptoms and watch for any fever or chills.  Once cardiac imaging/testing has been obtained we will likely need to refer to cardiology for optimization.  -     Echo complete w/ contrast if indicated; Future  -     Stress test only, exercise; Future  -     albuterol (Ventolin HFA) 90 mcg/act inhaler; Inhale 2 puffs every 4 (four) hours as needed for wheezing         DO Rivka Kat [facility]  9/25/2024 9:08 AM

## 2024-10-01 ENCOUNTER — HOSPITAL ENCOUNTER (OUTPATIENT)
Dept: NON INVASIVE DIAGNOSTICS | Facility: CLINIC | Age: 64
Discharge: HOME | End: 2024-10-01

## 2024-10-01 VITALS — HEIGHT: 62 IN | WEIGHT: 193 LBS | BODY MASS INDEX: 35.51 KG/M2

## 2024-10-01 DIAGNOSIS — R07.89 CHEST TIGHTNESS: ICD-10-CM

## 2024-10-01 DIAGNOSIS — R07.89 CHEST PRESSURE: ICD-10-CM

## 2024-10-01 DIAGNOSIS — R06.09 DOE (DYSPNEA ON EXERTION): ICD-10-CM

## 2024-10-01 DIAGNOSIS — R06.02 SHORTNESS OF BREATH: ICD-10-CM

## 2024-10-01 LAB
MAX HR PERCENT: 105 %
MAX HR: 162 BPM
RATE PRESSURE PRODUCT: NORMAL
SL CV STRESS RECOVERY BP: NORMAL MMHG
SL CV STRESS RECOVERY HR: 76 BPM
SL CV STRESS RECOVERY O2 SAT: 99 %
SL CV STRESS STAGE REACHED: 2
STRESS ANGINA INDEX: 0
STRESS BASELINE BP: NORMAL MMHG
STRESS BASELINE HR: 63 BPM
STRESS DUKE TREADMILL SCORE: 6
STRESS O2 SAT REST: 99 %
STRESS PEAK HR: 162 BPM
STRESS POST ESTIMATED WORKLOAD: 7 METS
STRESS POST EXERCISE DUR MIN: 6 MIN
STRESS POST EXERCISE DUR SEC: 1 SEC
STRESS POST O2 SAT PEAK: 98 %
STRESS POST PEAK BP: 184 MMHG
STRESS ST ELEVATION: 0 MM

## 2024-10-01 PROCEDURE — 93018 CV STRESS TEST I&R ONLY: CPT | Performed by: INTERNAL MEDICINE

## 2024-10-01 PROCEDURE — 93017 CV STRESS TEST TRACING ONLY: CPT

## 2024-10-01 PROCEDURE — 93016 CV STRESS TEST SUPVJ ONLY: CPT | Performed by: INTERNAL MEDICINE

## 2024-10-02 ENCOUNTER — HOSPITAL ENCOUNTER (OUTPATIENT)
Dept: NON INVASIVE DIAGNOSTICS | Facility: HOSPITAL | Age: 64
Discharge: HOME | End: 2024-10-02

## 2024-10-02 VITALS
DIASTOLIC BLOOD PRESSURE: 80 MMHG | BODY MASS INDEX: 35.51 KG/M2 | SYSTOLIC BLOOD PRESSURE: 128 MMHG | HEART RATE: 67 BPM | WEIGHT: 193 LBS | HEIGHT: 62 IN

## 2024-10-02 DIAGNOSIS — R07.89 CHEST TIGHTNESS: ICD-10-CM

## 2024-10-02 DIAGNOSIS — R07.89 CHEST PRESSURE: ICD-10-CM

## 2024-10-02 DIAGNOSIS — R06.02 SHORTNESS OF BREATH: ICD-10-CM

## 2024-10-02 DIAGNOSIS — R06.09 DOE (DYSPNEA ON EXERTION): ICD-10-CM

## 2024-10-02 LAB
CHEST PAIN STATEMENT: NORMAL
MAX DIASTOLIC BP: 80 MMHG
MAX PREDICTED HEART RATE: 154 BPM
PROTOCOL NAME: NORMAL
REASON FOR TERMINATION: NORMAL
STRESS POST EXERCISE DUR MIN: 6 MIN
STRESS POST EXERCISE DUR SEC: 1 SEC
STRESS POST PEAK HR: 162 BPM
STRESS POST PEAK SYSTOLIC BP: 184 MMHG
TARGET HR FORMULA: NORMAL
TEST INDICATION: NORMAL

## 2024-10-02 PROCEDURE — 93306 TTE W/DOPPLER COMPLETE: CPT

## 2024-10-02 PROCEDURE — 93306 TTE W/DOPPLER COMPLETE: CPT | Performed by: STUDENT IN AN ORGANIZED HEALTH CARE EDUCATION/TRAINING PROGRAM

## 2024-10-03 ENCOUNTER — TELEPHONE (OUTPATIENT)
Dept: FAMILY MEDICINE CLINIC | Facility: CLINIC | Age: 64
End: 2024-10-03

## 2024-10-03 DIAGNOSIS — R06.09 DOE (DYSPNEA ON EXERTION): Primary | ICD-10-CM

## 2024-10-03 DIAGNOSIS — R06.02 SHORTNESS OF BREATH: ICD-10-CM

## 2024-10-03 DIAGNOSIS — R07.89 CHEST TIGHTNESS: ICD-10-CM

## 2024-10-03 DIAGNOSIS — R93.1 ABNORMAL ECHOCARDIOGRAM: ICD-10-CM

## 2024-10-03 DIAGNOSIS — R07.89 CHEST PRESSURE: ICD-10-CM

## 2024-10-03 LAB
AORTIC ROOT: 3 CM
APICAL FOUR CHAMBER EJECTION FRACTION: 73 %
ASCENDING AORTA: 4.1 CM
BSA FOR ECHO PROCEDURE: 1.88 M2
E WAVE DECELERATION TIME: 261 MS
E/A RATIO: 0.82
FRACTIONAL SHORTENING: 28 (ref 28–44)
INTERVENTRICULAR SEPTUM IN DIASTOLE (PARASTERNAL SHORT AXIS VIEW): 1 CM
INTERVENTRICULAR SEPTUM: 1 CM (ref 0.6–1.1)
LAAS-AP2: 13.7 CM2
LAAS-AP4: 17.1 CM2
LEFT ATRIUM SIZE: 3.9 CM
LEFT ATRIUM VOLUME (MOD BIPLANE): 41 ML
LEFT ATRIUM VOLUME INDEX (MOD BIPLANE): 21.8 ML/M2
LEFT INTERNAL DIMENSION IN SYSTOLE: 3.8 CM (ref 2.1–4)
LEFT VENTRICULAR INTERNAL DIMENSION IN DIASTOLE: 5.3 CM (ref 3.5–6)
LEFT VENTRICULAR POSTERIOR WALL IN END DIASTOLE: 1 CM
LEFT VENTRICULAR STROKE VOLUME: 76 ML
LVSV (TEICH): 76 ML
MV E'TISSUE VEL-SEP: 12 CM/S
MV PEAK A VEL: 0.92 M/S
MV PEAK E VEL: 75 CM/S
MV STENOSIS PRESSURE HALF TIME: 76 MS
MV VALVE AREA P 1/2 METHOD: 2.89
RIGHT ATRIAL 2D VOLUME: 46 ML
RIGHT ATRIUM AREA SYSTOLE A4C: 16.3 CM2
RIGHT VENTRICLE ID DIMENSION: 3.6 CM
SL CV LEFT ATRIUM LENGTH A2C: 4.6 CM
SL CV LV EF: 73
SL CV PED ECHO LEFT VENTRICLE DIASTOLIC VOLUME (MOD BIPLANE) 2D: 136 ML
SL CV PED ECHO LEFT VENTRICLE SYSTOLIC VOLUME (MOD BIPLANE) 2D: 60 ML
TRICUSPID ANNULAR PLANE SYSTOLIC EXCURSION: 2.2 CM

## 2024-10-03 NOTE — TELEPHONE ENCOUNTER
Pt called back and was unable to read the message because her mychart has locked her out but she was more then happy to read to her his response which I did. SHE will call [employer] to get that reset and she will wait to hear from cardiology to set up an appt as well. She was pleased with the response as another dr had given her a different version.

## 2024-10-03 NOTE — TELEPHONE ENCOUNTER
Attempted to call the patient and review cardiac imaging.  I do feel that the patient should follow-up with cardiology just due to the echo and stress test.  Viktoriya sent    DO Rivka Kat [facility]  10/3/2024 3:44 PM

## 2024-10-07 ENCOUNTER — TELEPHONE (OUTPATIENT)
Dept: FAMILY MEDICINE CLINIC | Facility: CLINIC | Age: 64
End: 2024-10-07

## 2024-10-07 NOTE — TELEPHONE ENCOUNTER
Called patient and lvm regarding the message the doctor sent to her regarding her imaging results.  I advised that if she had any questions/concerns that she can send a message back to the doctor through [employer].

## 2024-10-13 DIAGNOSIS — E03.9 ACQUIRED HYPOTHYROIDISM: ICD-10-CM

## 2024-10-13 RX ORDER — LEVOTHYROXINE SODIUM 100 UG/1
TABLET ORAL
Qty: 90 TABLET | Refills: 1 | Status: SHIPPED | OUTPATIENT
Start: 2024-10-13 | End: 2024-10-22 | Stop reason: SDUPTHER

## 2024-10-17 ENCOUNTER — OFFICE VISIT (OUTPATIENT)
Dept: FAMILY MEDICINE CLINIC | Facility: CLINIC | Age: 64
End: 2024-10-17

## 2024-10-17 VITALS
WEIGHT: 197 LBS | HEIGHT: 62 IN | OXYGEN SATURATION: 98 % | HEART RATE: 61 BPM | DIASTOLIC BLOOD PRESSURE: 80 MMHG | SYSTOLIC BLOOD PRESSURE: 120 MMHG | BODY MASS INDEX: 36.25 KG/M2

## 2024-10-17 DIAGNOSIS — R07.89 CHEST TIGHTNESS: ICD-10-CM

## 2024-10-17 DIAGNOSIS — R06.09 DOE (DYSPNEA ON EXERTION): Primary | ICD-10-CM

## 2024-10-17 DIAGNOSIS — R93.1 ABNORMAL ECHOCARDIOGRAM: ICD-10-CM

## 2024-10-17 DIAGNOSIS — Z23 ENCOUNTER FOR IMMUNIZATION: ICD-10-CM

## 2024-10-17 DIAGNOSIS — R06.02 SHORTNESS OF BREATH: ICD-10-CM

## 2024-10-17 DIAGNOSIS — R07.89 CHEST PRESSURE: ICD-10-CM

## 2024-10-17 DIAGNOSIS — Z78.9 WEIGHT LOSS ADVISED: ICD-10-CM

## 2024-10-17 NOTE — PROGRESS NOTES
Outpatient Note- Follow up     HPI:     Jeri Acosta , 64 y.o. female  presents today for follow-up of for cardiac imaging.  Although I did send a MyChart message with a detailed discussion of the findings, we took the time to review the cardiac anatomy, pathophysiology, and effects of smoking, high blood pressure, and hypercholesterolemia.  The patient has a strong history of smoking and hyperlipidemia.  This is likely the reason for the patient's increase in EF percentage from the normal 55-65 to 73%.  Currently she is on atorvastatin to help with her cholesterol, but she has not required any medication for blood pressure.  We also reviewed the valves and structure of the heart.  The patient has continued to have episodes of sharp pain on the left side in addition to what sounds to be palpitations.  She feels that her heart is shaking or rapidly beating.  A stress test was performed on the patient, and during exertion there was no evidence of ischemia.  It is important to note that she typically has her symptoms at rest or when she is relaxing.  There may be some psychiatric component to this since her anxiety ramps up when she starts to have any type of symptoms.  She has scheduled for cardiology, but unfortunately it is in December.  We discussed that her symptoms should be seen sooner than later, Dr. Minor is another option locally.  I do believe that he is still practicing.    She also discussed the interest in seeing weight management/bariatrics.  Her  went through the program and he lost 25 pounds and has kept it off without utilizing any of the GLP-1's or other medications.    Past Medical History:   Diagnosis Date    Anxiety     Anxiety     Bunion 2022    Colon polyp     Difficulty walking     Leg swelling 02/01/2023    Mood disorder (HCC) 08/22/2014    Panic attacks     Thyroid disease     Visual impairment       ROS:   Review of Systems   Patient will have intermittent anxiety, panic attacks,  trouble sleeping, rapid heart rate, intermittent chest pain without syncope or diaphoresis.  She denies any lightheadedness, dizziness, diarrhea, constipation, abdominal pain, nausea, vomiting.    OBJECTIVE  Vitals:    10/17/24 0840   BP: 120/80   Pulse: 61   SpO2: 98%        Physical Exam   No PE performed  The majority of the patient's visit was spent on reviewing the anatomy of the heart, pathophysiology, and results of her echo and stress test.    ASSESSMENT AND PLAN   Jeri was seen today for follow-up.  Diagnoses and all orders for this visit:    CASAS (dyspnea on exertion)  Shortness of breath  Chest tightness  Chest pressure  Abnormal echocardiogram  Unknown cause of current symptoms.  The patient has dyspnea on exertion, shortness of breath intermittently.  She is not having symptoms on exertion.  Due to the intermittent nature of her symptoms, I would recommend that she follow-up with cardiology.  Although she has been referred to [facility], unfortunately the earliest appointment due to nonurgent nature of her diagnoses per schedulers, her appointment is in December.  She would likely benefit from seeing somebody sooner.  He will attempt Dr. Minor and if they are unable to accommodate her sooner, then I will see if one of our colleagues may be able to intervene  -     Ambulatory Referral to Cardiology; Future    Weight loss advised  Patient currently has a BMI of 36.  She is looking to lower down her weight to facilitate being healthier.  She is interested in seeing weight management instead of just starting medication.  I recommend she follow-up and enroll in the program that her  did since he had significant improvement of around 25 pounds.  I do feel that she would benefit from this type of intervention.  We discussed the pros and cons of medication, and she should follow-up with bariatrics to see if they feel it is indicated.  The GLP-1's are indicated in individuals with cardiac issues.  -      Ambulatory Referral to Weight Management; Future    Encounter for immunization  Reviewed the benefits of flu vaccine.  In addition I did recommend a pneumonia as well as RSV vaccine for the patient due to high risk, age, and smoking history of greater than 40 pack years.  -     influenza vaccine, high-dose, PF 0.5 mL (Fluzone High Dose)      DO Rivka Kat [facility]  10/17/2024 9:35 AM     40 minutes spent with documentation and patient.  Greater than 50% was face-to-face.

## 2024-10-22 DIAGNOSIS — E03.9 ACQUIRED HYPOTHYROIDISM: ICD-10-CM

## 2024-10-22 RX ORDER — LEVOTHYROXINE SODIUM 100 UG/1
TABLET ORAL
Qty: 90 TABLET | Refills: 1 | Status: SHIPPED | OUTPATIENT
Start: 2024-10-22

## 2024-10-29 ENCOUNTER — TELEPHONE (OUTPATIENT)
Dept: CARDIOLOGY CLINIC | Facility: CLINIC | Age: 64
End: 2024-10-29

## 2024-11-13 DIAGNOSIS — E78.00 PURE HYPERCHOLESTEROLEMIA: ICD-10-CM

## 2024-11-14 RX ORDER — ATORVASTATIN CALCIUM 10 MG/1
10 TABLET, FILM COATED ORAL DAILY
Qty: 30 TABLET | Refills: 0 | Status: SHIPPED | OUTPATIENT
Start: 2024-11-14

## 2024-12-13 DIAGNOSIS — E78.00 PURE HYPERCHOLESTEROLEMIA: ICD-10-CM

## 2024-12-15 RX ORDER — ATORVASTATIN CALCIUM 10 MG/1
10 TABLET, FILM COATED ORAL DAILY
Qty: 30 TABLET | Refills: 0 | Status: SHIPPED | OUTPATIENT
Start: 2024-12-15

## 2024-12-15 NOTE — TELEPHONE ENCOUNTER
Called patient and requested that labs be performed.  Previously ordered in October.  Will have her evaluate lipid panel to determine current values for her cholesterol.  May need to go up on dose depending on findings.  Message left on voicemail.

## 2024-12-24 DIAGNOSIS — F41.0 GENERALIZED ANXIETY DISORDER WITH PANIC ATTACKS: ICD-10-CM

## 2024-12-24 DIAGNOSIS — F41.1 GENERALIZED ANXIETY DISORDER WITH PANIC ATTACKS: ICD-10-CM

## 2024-12-24 RX ORDER — HYDROXYZINE HYDROCHLORIDE 25 MG/1
TABLET, FILM COATED ORAL
Qty: 90 TABLET | Refills: 1 | Status: SHIPPED | OUTPATIENT
Start: 2024-12-24

## 2024-12-24 NOTE — TELEPHONE ENCOUNTER
Jeri has been discharged from the clinic since February 2024 secondary to repeated no-shows. As such, refill request is inappropriate and will be denied. Please check this prior to sending request.

## 2024-12-24 NOTE — TELEPHONE ENCOUNTER
Jeri has been discharged from the clinic since February 2024 secondary to repeated no-shows. As such, refill request is inappropriate and will be denied.

## 2024-12-24 NOTE — TELEPHONE ENCOUNTER
Patient cannot follow-up with psychiatry.  Will continue medication until patient follows up for her history of panic attacks.  This was only discontinued since patient did not follow-up with psychiatry, it does not seem that this was removed/discontinued due to side effects or complications.

## 2024-12-30 ENCOUNTER — APPOINTMENT (OUTPATIENT)
Dept: LAB | Facility: CLINIC | Age: 64
End: 2024-12-30

## 2024-12-30 DIAGNOSIS — R07.89 CHEST TIGHTNESS: ICD-10-CM

## 2024-12-30 DIAGNOSIS — R07.89 CHEST PRESSURE: ICD-10-CM

## 2024-12-30 DIAGNOSIS — R06.09 DOE (DYSPNEA ON EXERTION): ICD-10-CM

## 2024-12-30 DIAGNOSIS — R06.02 SHORTNESS OF BREATH: ICD-10-CM

## 2024-12-30 LAB
ALBUMIN SERPL BCG-MCNC: 4.2 G/DL (ref 3.5–5)
ALP SERPL-CCNC: 59 U/L (ref 34–104)
ALT SERPL W P-5'-P-CCNC: 23 U/L (ref 7–52)
ANION GAP SERPL CALCULATED.3IONS-SCNC: 7 MMOL/L (ref 4–13)
AST SERPL W P-5'-P-CCNC: 22 U/L (ref 13–39)
BILIRUB SERPL-MCNC: 0.4 MG/DL (ref 0.2–1)
BUN SERPL-MCNC: 19 MG/DL (ref 5–25)
CALCIUM SERPL-MCNC: 9.1 MG/DL (ref 8.4–10.2)
CHLORIDE SERPL-SCNC: 105 MMOL/L (ref 96–108)
CHOLEST SERPL-MCNC: 177 MG/DL (ref ?–200)
CO2 SERPL-SCNC: 29 MMOL/L (ref 21–32)
CREAT SERPL-MCNC: 0.92 MG/DL (ref 0.6–1.3)
GFR SERPL CREATININE-BSD FRML MDRD: 65 ML/MIN/1.73SQ M
GLUCOSE P FAST SERPL-MCNC: 77 MG/DL (ref 65–99)
HDLC SERPL-MCNC: 76 MG/DL
LDLC SERPL CALC-MCNC: 89 MG/DL (ref 0–100)
MAGNESIUM SERPL-MCNC: 2 MG/DL (ref 1.9–2.7)
NONHDLC SERPL-MCNC: 101 MG/DL
POTASSIUM SERPL-SCNC: 4 MMOL/L (ref 3.5–5.3)
PROT SERPL-MCNC: 7.1 G/DL (ref 6.4–8.4)
SODIUM SERPL-SCNC: 141 MMOL/L (ref 135–147)
TRIGL SERPL-MCNC: 59 MG/DL (ref ?–150)

## 2024-12-30 PROCEDURE — 80061 LIPID PANEL: CPT

## 2024-12-30 PROCEDURE — 80053 COMPREHEN METABOLIC PANEL: CPT

## 2024-12-30 PROCEDURE — 36415 COLL VENOUS BLD VENIPUNCTURE: CPT

## 2024-12-30 PROCEDURE — 83735 ASSAY OF MAGNESIUM: CPT

## 2025-01-05 ENCOUNTER — RESULTS FOLLOW-UP (OUTPATIENT)
Dept: FAMILY MEDICINE CLINIC | Facility: CLINIC | Age: 65
End: 2025-01-05

## 2025-01-16 ENCOUNTER — TELEPHONE (OUTPATIENT)
Age: 65
End: 2025-01-16

## 2025-01-16 NOTE — TELEPHONE ENCOUNTER
Writer called and left VM. Writer will send outside resources to patient  when call is returned. Writer left call back # [phone].

## 2025-01-16 NOTE — TELEPHONE ENCOUNTER
Patients spouse called in regards to patient seeking services for a psychologists instead of a psychiatrist. Writer informed patient they would relay the message and have someone reach out.

## 2025-01-17 DIAGNOSIS — E78.00 PURE HYPERCHOLESTEROLEMIA: ICD-10-CM

## 2025-01-17 RX ORDER — ATORVASTATIN CALCIUM 10 MG/1
10 TABLET, FILM COATED ORAL DAILY
Qty: 30 TABLET | Refills: 5 | Status: SHIPPED | OUTPATIENT
Start: 2025-01-17

## 2025-02-04 DIAGNOSIS — F33.1 MAJOR DEPRESSIVE DISORDER, RECURRENT EPISODE, MODERATE (HCC): ICD-10-CM

## 2025-02-05 RX ORDER — ESCITALOPRAM OXALATE 10 MG/1
TABLET ORAL
Qty: 60 TABLET | Refills: 5 | Status: SHIPPED | OUTPATIENT
Start: 2025-02-05

## 2025-02-16 DIAGNOSIS — R06.02 SHORTNESS OF BREATH: ICD-10-CM

## 2025-02-16 DIAGNOSIS — R07.89 CHEST PRESSURE: ICD-10-CM

## 2025-02-16 DIAGNOSIS — G47.00 INSOMNIA: ICD-10-CM

## 2025-02-16 DIAGNOSIS — R07.89 CHEST TIGHTNESS: ICD-10-CM

## 2025-02-16 DIAGNOSIS — R06.09 DOE (DYSPNEA ON EXERTION): ICD-10-CM

## 2025-02-16 RX ORDER — ALBUTEROL SULFATE 90 UG/1
AEROSOL, METERED RESPIRATORY (INHALATION)
Qty: 18 G | Refills: 0 | Status: SHIPPED | OUTPATIENT
Start: 2025-02-16

## 2025-02-16 RX ORDER — TRAZODONE HYDROCHLORIDE 100 MG/1
100 TABLET ORAL
Qty: 90 TABLET | Refills: 0 | Status: SHIPPED | OUTPATIENT
Start: 2025-02-16

## 2025-02-18 ENCOUNTER — HOSPITAL ENCOUNTER (OUTPATIENT)
Dept: CT IMAGING | Facility: HOSPITAL | Age: 65
Discharge: HOME | End: 2025-02-18
Attending: INTERNAL MEDICINE
Payer: MEDICARE

## 2025-02-18 ENCOUNTER — RA CDI HCC (OUTPATIENT)
Dept: OTHER | Facility: HOSPITAL | Age: 65
End: 2025-02-18

## 2025-02-18 DIAGNOSIS — R10.30 LOWER ABDOMINAL PAIN: ICD-10-CM

## 2025-02-18 PROCEDURE — 74177 CT ABD & PELVIS W/CONTRAST: CPT

## 2025-02-18 RX ADMIN — IOHEXOL 100 ML: 350 INJECTION, SOLUTION INTRAVENOUS at 16:04

## 2025-02-20 ENCOUNTER — OFFICE VISIT (OUTPATIENT)
Dept: CARDIOLOGY CLINIC | Facility: CLINIC | Age: 65
End: 2025-02-20
Payer: MEDICARE

## 2025-02-20 VITALS
WEIGHT: 203.1 LBS | BODY MASS INDEX: 37.37 KG/M2 | DIASTOLIC BLOOD PRESSURE: 78 MMHG | SYSTOLIC BLOOD PRESSURE: 126 MMHG | HEART RATE: 59 BPM | HEIGHT: 62 IN

## 2025-02-20 DIAGNOSIS — R06.09 DOE (DYSPNEA ON EXERTION): ICD-10-CM

## 2025-02-20 DIAGNOSIS — E78.00 PURE HYPERCHOLESTEROLEMIA: ICD-10-CM

## 2025-02-20 DIAGNOSIS — I77.810 MILD DILATION OF ASCENDING AORTA (HCC): Primary | ICD-10-CM

## 2025-02-20 DIAGNOSIS — R06.02 SHORTNESS OF BREATH: ICD-10-CM

## 2025-02-20 DIAGNOSIS — R93.1 ABNORMAL ECHOCARDIOGRAM: ICD-10-CM

## 2025-02-20 DIAGNOSIS — R07.89 CHEST PRESSURE: ICD-10-CM

## 2025-02-20 DIAGNOSIS — R07.89 CHEST TIGHTNESS: ICD-10-CM

## 2025-02-20 PROCEDURE — 99204 OFFICE O/P NEW MOD 45 MIN: CPT | Performed by: INTERNAL MEDICINE

## 2025-02-20 PROCEDURE — 93000 ELECTROCARDIOGRAM COMPLETE: CPT | Performed by: INTERNAL MEDICINE

## 2025-02-20 RX ORDER — FUROSEMIDE 20 MG/1
20 TABLET ORAL 2 TIMES DAILY
Qty: 30 TABLET | Refills: 0 | Status: SHIPPED | OUTPATIENT
Start: 2025-02-20

## 2025-02-20 RX ORDER — DEXTROAMPHETAMINE SACCHARATE, AMPHETAMINE ASPARTATE, DEXTROAMPHETAMINE SULFATE AND AMPHETAMINE SULFATE 2.5; 2.5; 2.5; 2.5 MG/1; MG/1; MG/1; MG/1
10 TABLET ORAL
COMMUNITY

## 2025-02-20 NOTE — ASSESSMENT & PLAN NOTE
4.1 cm on echo. She has a CT scan for lung cancer screening upcoming which will further evaluate this. Will likely need yearly surveillance of it.

## 2025-02-20 NOTE — PROGRESS NOTES
Cardiology Consultation     Jeri Acosta  [id number]  1960  HEART & VASCULAR [facility] [place]  [address]  [place] [id number]    Orders Placed This Encounter   Procedures    B-Type Natriuretic Peptide(BNP)    POCT ECG         Assessment & Plan  CASAS (dyspnea on exertion)  The patient was told from [facility] that she had diastolic congestive heart failure because she had an elevated BNP and some of her symptoms were suggestive of this. Her echocardiogram shows preserved LV function, normal diastolic parameters (reviewed by myself personally). No indirect findings of diastolic heart failure really. She does have a diet that is high in sodium since she makes [employer] for her work.  We discussed limiting sodium. She was previously suggested to have a trial of Jardiance. In an effort to just put this to rest, I suggested a short course of Lasix to see if she has any improvement. I do suspect that she does not really have the diagnosis of heart failure. We can repeat a BNP in a few months just to trend this.  Mild dilation of ascending aorta (HCC)  4.1 cm on echo. She has a CT scan for lung cancer screening upcoming which will further evaluate this. Will likely need yearly surveillance of it.  Pure hypercholesterolemia  On 10 mg of atorvastatin. Lipid panel is controlled. Mild calcification on her CT scan in the aorta and iliacs. Previously with a few negative stress tests and negative coronary CTA.    She can discontinue the aspirin.  Chest tightness  Noncardiac sounding chest pain. Good coronary evaluation with a stress echo in 2023, exercise treadmill test more recently in 2024. Coronary CTA at [facility] in 2023, also. All negative for any ischemia. Okay to continue her lipid lowering therapy with atorvastatin 10 mg. Okay to discontinue. Particularly given her UC and flares where she does get  some bloody stools.  Abnormal echocardiogram  Echocardiogram reviewed personally is normal for age with the exception of the TAA as noted above.    History of Present Illness:    64-year-old female is presenting to the office today, referred by Dr. Walker for cardiac evaluation. Patient was previously seen cardiology at  where her  had established after his heart attack. She has no significant cardiac diagnoses herself, but was being seen for symptoms of chest pain, shortness of breath, palpitations.    There are notes available in the media section which I reviewed in detail. There is also results of testing which is reviewed in detail with the patient and her .    In brief, she has had longstanding symptoms of the palpitations which were evaluated with a monitor a few years back without any significant arrhythmias identified. This is being treated conservatively.    She has chest pain which is center of the chest radiating to the left shoulder. Not related to exertion. She actually had an episode while she was in the office with me. May be related to anxiety. From a coronary evaluation, she has had a stress echo which was negative. Then she had a coronary CTA similarly negative for any obstructive CAD. Most recently she had an exercise treadmill test at [facility] which did not show any ischemia.    Major concern comes from the patient being told last fall that she had the diagnosis of congestive heart failure. Echocardiograms are normal including diastolic function. This is more based on the fact that she was having symptoms of shortness of breath. She had a BNP which was elevated, so the cardiologist recommended starting Jardiance. She said there was not much discussion about this she was simply given a message about the medication she never took it. She has a diet that is high in sodium. She drinks a lot of coffee, but does not necessarily drink a lot of fluid or water  total in the day. Has some shortness of breath but no clear PND, orthopnea. Edema seems more dependent worse at the end of the day after working on her feet.  Her job is making [employer].    She does have a small 4.1cm TAA.    Patient Active Problem List   Diagnosis    ADHD (attention deficit hyperactivity disorder), combined type    Generalized anxiety disorder with panic attacks    Ulcerative colitis (HCC)    Osteopenia    Vitamin D deficiency    Acquired hypothyroidism    Obesity, Class II, BMI 35-39.9    Insomnia    Pure hypercholesterolemia    Tobacco use    Major depressive disorder, recurrent episode, moderate (HCC)    Colon polyp    Mild dilation of ascending aorta (HCC)    Other fatigue    GERD (gastroesophageal reflux disease)    Chronic idiopathic constipation    Achilles tendonitis    Calcaneal spur, left    Ulcerative (chronic) enterocolitis (HCC)    Palpitations     Past Medical History:   Diagnosis Date    Anxiety     Anxiety     Bunion 2022    Colon polyp     Difficulty walking     Leg swelling 02/01/2023    Mood disorder (HCC) 08/22/2014    Panic attacks     Thyroid disease     Visual impairment      Social History     Tobacco Use    Smoking status: Every Day     Current packs/day: 0.50     Average packs/day: 0.6 packs/day for 63.1 years (40.3 ttl pk-yrs)     Types: Cigarettes     Start date: 1/4/1980    Smokeless tobacco: Never    Tobacco comments:     since age 16   Vaping Use    Vaping status: Every Day    Substances: Nicotine   Substance Use Topics    Alcohol use: Yes     Alcohol/week: 1.0 standard drink of alcohol     Comment: Rare- 1 every few weeks     Drug use: Yes     Frequency: 7.0 times per week     Types: Marijuana      Family History   Problem Relation Age of Onset    Lung cancer Mother     Cancer Mother     Substance Abuse Brother     Asthma Son     Hypothyroidism Daughter     Thyroid disease Daughter     Ovarian cancer Maternal Grandmother     Parkinsonism Maternal  Grandfather     Glaucoma Paternal Grandmother     Ulcerative colitis Paternal Grandmother     Alcohol abuse Neg Hx     Depression Neg Hx     Drug abuse Neg Hx     Diabetes Neg Hx     Hypertension Neg Hx     Heart disease Neg Hx     Stroke Neg Hx      Past Surgical History:   Procedure Laterality Date     SECTION      COLONOSCOPY      HYSTERECTOMY      TUBAL LIGATION  82       Current Outpatient Medications:     amphetamine-dextroamphetamine (ADDERALL) 10 mg tablet, Take 10 mg by mouth 2 (two) times a day, Disp: , Rfl:     aspirin (ECOTRIN LOW STRENGTH) 81 mg EC tablet, 81 mg, Disp: , Rfl:     atorvastatin (LIPITOR) 10 mg tablet, TAKE 1 TABLET(10 MG) BY MOUTH DAILY, Disp: 30 tablet, Rfl: 5    escitalopram (LEXAPRO) 10 mg tablet, TAKE 1/2 TABLET(5 MG) BY MOUTH DAILY FOR 7 DAYS THEN TAKE 1 TABLET(10 MG) BY MOUTH DAILY, Disp: 60 tablet, Rfl: 5    furosemide (LASIX) 20 mg tablet, Take 1 tablet (20 mg total) by mouth 2 (two) times a day, Disp: 30 tablet, Rfl: 0    hydrOXYzine HCL (ATARAX) 25 mg tablet, Take 1 tablet (25mg) up to three times daily as needed for anxiety and panic symptoms., Disp: 90 tablet, Rfl: 1    levothyroxine 100 mcg tablet, TAKE 1 TABLET(100 MCG) BY MOUTH DAILY EARLY MORNING, Disp: 90 tablet, Rfl: 1    Linzess 145 MCG CAPS, Take 145 mcg by mouth daily, Disp: , Rfl:     mesalamine (LIALDA) 1.2 g EC tablet, Take 2 tablets by mouth in the morning, Disp: , Rfl:     Multiple Vitamins-Minerals (multivitamin with minerals) tablet, Take 1 tablet by mouth daily, Disp: , Rfl:     traZODone (DESYREL) 100 mg tablet, TAKE 1 TABLET(100 MG) BY MOUTH DAILY AT BEDTIME AS NEEDED FOR SLEEP, Disp: 90 tablet, Rfl: 0    Ventolin  (90 Base) MCG/ACT inhaler, INHALE 2 PUFFS BY MOUTH EVERY 4 HOURS AS NEEDED FOR WHEEZING, Disp: 18 g, Rfl: 0  No Known Allergies    Vitals:    25 0825   BP: 126/78   BP Location: Right arm   Patient Position: Sitting   Cuff Size: Large   Pulse: 59   Weight: 92.1 kg  "(203 lb 1.6 oz)   Height: 5' 2\" (1.575 m)     Vitals:    02/20/25 0825   Weight: 92.1 kg (203 lb 1.6 oz)      Height: 5' 2\" (157.5 cm)   Body mass index is 37.15 kg/m².    Physical Exam:   GENERAL: Alert, well appearing, and in no distress  HEENT:  PERRL, EOMI, no scleral icterus, no conjunctival pallor  NECK:  Supple, No elevated JVP, no thyromegaly, no carotid bruits  HEART:  Regular rate and rhythm, normal S1/S2, no S3/S4, no murmur or rub  LUNGS:  Clear to auscultation bilaterally  ABDOMEN:  Soft, non-tender, positive bowel sounds, no rebound or guarding  EXTREMITIES:  No edema  VASCULAR:  Normal pedal pulses   NEURO: No focal deficits,  SKIN: Normal without suspicious lesions on exposed skin    ROS:  Positive for anxiety, palpitations, shortness of breath, chest pain. Fatigue. Weight gain. UC  Except as noted in HPI, is otherwise reviewed in detail and a 12 point review of systems is negative.    Labs:  Lab Results   Component Value Date    SODIUM 141 12/30/2024    K 4.0 12/30/2024     12/30/2024    CREATININE 0.92 12/30/2024    BUN 19 12/30/2024    CO2 29 12/30/2024    ALT 23 12/30/2024    AST 22 12/30/2024    TSH 3.760 12/02/2020    INR 1.00 08/04/2014    GLUF 77 12/30/2024    HGBA1C 5.4 03/08/2021    WBC 5.66 05/14/2024    HGB 12.8 05/14/2024    HCT 39.0 05/14/2024     05/14/2024       No results found for: \"CHOL\"  Lab Results   Component Value Date    HDL 76 12/30/2024    HDL 65 08/21/2023    HDL 64 02/07/2023     Lab Results   Component Value Date    LDLCALC 89 12/30/2024    LDLCALC 71 08/21/2023    LDLCALC 118 (H) 02/07/2023     Lab Results   Component Value Date    TRIG 59 12/30/2024    TRIG 36 08/21/2023    TRIG 56 02/07/2023     Testing:  Reviewed in Media section.    Echo 10/2024  Left Ventricle: Left ventricular cavity size is normal. Wall thickness is normal. The left ventricular ejection fraction is 73%. Systolic function is hyperdynamic. Wall motion is normal. Diastolic function is " normal.    Right Ventricle: Right ventricular cavity size is normal. Systolic function is normal.    Mitral Valve: There is mild annular calcification.    Aorta: The aortic root is normal in size. The ascending aorta is mildly dilated. The aortic root is 3.00 cm. The ascending aorta is 4.1 cm.    Prior TTE study available for comparison. Prior study date: 12/28/2020. Changes noted when compared to prior study. Changes include: Slight progression of ascending aorta aneurysm.  Previously measured 3.8 cm.     EKG:  Sinus rhythm, 59 BPM. Normal EKG

## 2025-02-20 NOTE — ASSESSMENT & PLAN NOTE
On 10 mg of atorvastatin. Lipid panel is controlled. Mild calcification on her CT scan in the aorta and iliacs. Previously with a few negative stress tests and negative coronary CTA.    She can discontinue the aspirin.

## 2025-02-24 ENCOUNTER — HOSPITAL ENCOUNTER (OUTPATIENT)
Dept: CT IMAGING | Facility: HOSPITAL | Age: 65
Discharge: HOME | End: 2025-02-24
Attending: FAMILY MEDICINE

## 2025-02-24 ENCOUNTER — TELEPHONE (OUTPATIENT)
Dept: FAMILY MEDICINE CLINIC | Facility: CLINIC | Age: 65
End: 2025-02-24

## 2025-02-24 DIAGNOSIS — F17.211 CIGARETTE NICOTINE DEPENDENCE IN REMISSION: ICD-10-CM

## 2025-02-24 NOTE — TELEPHONE ENCOUNTER
"PA has been started for the patient by the pharmacy for hydroxyzine HCI 25mg Tablets.      Login to [email or web address] and click \"enter a key\".    Enter the patient's last name, date of birth and the key.    Key:  [id number]    Complete the PA and click \"send to plan\" for approval.    "

## 2025-02-25 ENCOUNTER — OFFICE VISIT (OUTPATIENT)
Dept: FAMILY MEDICINE CLINIC | Facility: CLINIC | Age: 65
End: 2025-02-25
Payer: MEDICARE

## 2025-02-25 VITALS
WEIGHT: 203 LBS | HEIGHT: 62 IN | HEART RATE: 70 BPM | BODY MASS INDEX: 37.36 KG/M2 | DIASTOLIC BLOOD PRESSURE: 80 MMHG | OXYGEN SATURATION: 98 % | SYSTOLIC BLOOD PRESSURE: 120 MMHG

## 2025-02-25 DIAGNOSIS — F51.01 PRIMARY INSOMNIA: Primary | ICD-10-CM

## 2025-02-25 DIAGNOSIS — K51.00 ULCERATIVE PANCOLITIS WITHOUT COMPLICATION (HCC): ICD-10-CM

## 2025-02-25 DIAGNOSIS — F41.1 GENERALIZED ANXIETY DISORDER WITH PANIC ATTACKS: ICD-10-CM

## 2025-02-25 DIAGNOSIS — F33.1 MAJOR DEPRESSIVE DISORDER, RECURRENT EPISODE, MODERATE (HCC): ICD-10-CM

## 2025-02-25 DIAGNOSIS — F41.0 GENERALIZED ANXIETY DISORDER WITH PANIC ATTACKS: ICD-10-CM

## 2025-02-25 PROCEDURE — 99213 OFFICE O/P EST LOW 20 MIN: CPT | Performed by: FAMILY MEDICINE

## 2025-02-25 PROCEDURE — G2211 COMPLEX E/M VISIT ADD ON: HCPCS | Performed by: FAMILY MEDICINE

## 2025-02-25 RX ORDER — ALPRAZOLAM 0.25 MG
0.25 TABLET ORAL 2 TIMES DAILY PRN
Qty: 60 TABLET | Refills: 0 | Status: SHIPPED | OUTPATIENT
Start: 2025-02-25

## 2025-02-25 RX ORDER — ALPRAZOLAM 0.25 MG
0.25 TABLET ORAL
Qty: 60 TABLET | Refills: 0 | Status: SHIPPED | OUTPATIENT
Start: 2025-02-25 | End: 2025-02-25

## 2025-02-25 NOTE — TELEPHONE ENCOUNTER
Request for Medicare Prescription Drug Coverage Determination Form from [facility] scanned in.
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
## 2025-02-25 NOTE — TELEPHONE ENCOUNTER
PA for HYDROXYZINE 25MG APPROVED     Date(s) approved 1/1/25-2/25/26    Case #[id number]    Patient advised by          [][place] [facility] Message  []Phone call   []LMOM  []L/M to call office as no active Communication consent on file  []Unable to leave detailed message as VM not approved on Communication consent       Pharmacy advised by    []Fax  [x]Phone call  Patient picked up    Specialty Pharmacy    []     Approval letter scanned into Media Yes

## 2025-02-25 NOTE — ASSESSMENT & PLAN NOTE
See FRANCO with panic attacks    Orders:    ALPRAZolam (XANAX) 0.25 mg tablet; Take 1 tablet (0.25 mg total) by mouth 2 (two) times a day as needed for anxiety
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
## 2025-02-25 NOTE — TELEPHONE ENCOUNTER
PA for hydroxyzine 25mg SUBMITTED to [facility]    via    []CMM-KEY:   [x]Surescripts-Case ID #[id number]    []Availity-Auth ID # NDC #   []Faxed to plan   []Other website   []Phone call Case ID #     [x]PA sent as URGENT    All office notes, labs and other pertaining documents and studies sent. Clinical questions answered. Awaiting determination from insurance company.     Turnaround time for your insurance to make a decision on your Prior Authorization can take 7-21 business days.

## 2025-02-25 NOTE — ASSESSMENT & PLAN NOTE
Patient has history of ulcerative pancolitis.  She is following with GI.  Currently she is having abdominal symptoms, but after CT review recently there was no acute abnormalities.  It is likely a manifestation of her current anxiety, panic, and overall stress.  She is to continue to monitor and follow-up in the office if needed.

## 2025-03-03 ENCOUNTER — RA CDI HCC (OUTPATIENT)
Dept: OTHER | Facility: HOSPITAL | Age: 65
End: 2025-03-03

## 2025-03-03 ENCOUNTER — TELEPHONE (OUTPATIENT)
Age: 65
End: 2025-03-03

## 2025-03-03 NOTE — TELEPHONE ENCOUNTER
Patient has been added to the Medication Management wait list without a referral. Pt was D/C from Dr Cordero 2/2024. The pt needs to be seen again. She is on Xanax from PCP and it doesn't seem to be working.  Insurance: Medicare A and B  Insurance Type:    Commercial []   Medicaid []   [facility] (if applicable)   Medicare [x]  [facility]  Provider Preference: Anyone  Virtual: Yes [] No [x]  Were outside resources sent: Yes [] No [x]

## 2025-03-10 ENCOUNTER — OFFICE VISIT (OUTPATIENT)
Dept: FAMILY MEDICINE CLINIC | Facility: CLINIC | Age: 65
End: 2025-03-10
Payer: MEDICARE

## 2025-03-10 VITALS
SYSTOLIC BLOOD PRESSURE: 122 MMHG | WEIGHT: 200 LBS | OXYGEN SATURATION: 97 % | HEART RATE: 81 BPM | HEIGHT: 62 IN | BODY MASS INDEX: 36.8 KG/M2 | DIASTOLIC BLOOD PRESSURE: 80 MMHG

## 2025-03-10 DIAGNOSIS — F41.1 GENERALIZED ANXIETY DISORDER WITH PANIC ATTACKS: Primary | ICD-10-CM

## 2025-03-10 DIAGNOSIS — F33.1 MAJOR DEPRESSIVE DISORDER, RECURRENT EPISODE, MODERATE (HCC): ICD-10-CM

## 2025-03-10 DIAGNOSIS — F51.01 PRIMARY INSOMNIA: ICD-10-CM

## 2025-03-10 DIAGNOSIS — F41.0 GENERALIZED ANXIETY DISORDER WITH PANIC ATTACKS: Primary | ICD-10-CM

## 2025-03-10 DIAGNOSIS — Z78.9 WEIGHT LOSS ADVISED: ICD-10-CM

## 2025-03-10 DIAGNOSIS — E66.9 OBESITY (BMI 30-39.9): ICD-10-CM

## 2025-03-10 DIAGNOSIS — R06.09 DOE (DYSPNEA ON EXERTION): ICD-10-CM

## 2025-03-10 PROCEDURE — 99214 OFFICE O/P EST MOD 30 MIN: CPT | Performed by: FAMILY MEDICINE

## 2025-03-10 PROCEDURE — G2211 COMPLEX E/M VISIT ADD ON: HCPCS | Performed by: FAMILY MEDICINE

## 2025-03-10 RX ORDER — ALPRAZOLAM 0.5 MG
0.5 TABLET ORAL DAILY PRN
Qty: 30 TABLET | Refills: 0 | Status: SHIPPED | OUTPATIENT
Start: 2025-03-17

## 2025-03-10 NOTE — ASSESSMENT & PLAN NOTE
See FRANCO and primary insomnia    Orders:    ALPRAZolam (XANAX) 0.5 mg tablet; Take 1 tablet (0.5 mg total) by mouth daily as needed for anxiety Do not start before March 17, 2025.

## 2025-03-10 NOTE — ASSESSMENT & PLAN NOTE
With controlling some of her anxiety and panic attacks, the insomnia has improved.  Orders:    ALPRAZolam (XANAX) 0.5 mg tablet; Take 1 tablet (0.5 mg total) by mouth daily as needed for anxiety Do not start before March 17, 2025.

## 2025-03-10 NOTE — ASSESSMENT & PLAN NOTE
Patient has been doing better after getting control of her overall anxiety.  It is also help that family members in the house have calm down and it is less chaotic at home.  Her significant other has been monitoring the situation and attempting to mitigate/mediate any issues that arise.  She started with taking two tablets twice a day.  The patient was only supposed to take 1 twice a day.  This has been made up in the days she has not taken any that are overall good.  We discussed starting new dose of 0.5 mg daily or she can split the pill to BID dosing as she had previously.  She has been using medication appropriately over the last week.  She also has reached out to psychiatry.  Therefore will continue to monitor herself closely.  Start coping mechanisms that were reviewed in office, and avoid or remove herself from conflict that is starting or increasing in the home.   Orders:    ALPRAZolam (XANAX) 0.5 mg tablet; Take 1 tablet (0.5 mg total) by mouth daily as needed for anxiety Do not start before March 17, 2025.

## 2025-03-10 NOTE — PROGRESS NOTES
Name: Jeri Acosta      : 1960      MRN: [id number]  Encounter Provider: Jordy Walker DO  Encounter Date: 3/10/2025   Encounter department: [facility]  :  Assessment & Plan  Generalized anxiety disorder with panic attacks  Patient has been doing better after getting control of her overall anxiety.  It is also help that family members in the house have calm down and it is less chaotic at home.  Her significant other has been monitoring the situation and attempting to mitigate/mediate any issues that arise.  She started with taking two tablets twice a day.  The patient was only supposed to take 1 twice a day.  This has been made up in the days she has not taken any that are overall good.  We discussed starting new dose of 0.5 mg daily or she can split the pill to BID dosing as she had previously.  She has been using medication appropriately over the last week.  She also has reached out to psychiatry.  Therefore will continue to monitor herself closely.  Start coping mechanisms that were reviewed in office, and avoid or remove herself from conflict that is starting or increasing in the home.   Orders:    ALPRAZolam (XANAX) 0.5 mg tablet; Take 1 tablet (0.5 mg total) by mouth daily as needed for anxiety Do not start before 2025.    Primary insomnia  With controlling some of her anxiety and panic attacks, the insomnia has improved.  Orders:    ALPRAZolam (XANAX) 0.5 mg tablet; Take 1 tablet (0.5 mg total) by mouth daily as needed for anxiety Do not start before 2025.    Major depressive disorder, recurrent episode, moderate (HCC)  See FRANCO and primary insomnia    Orders:    ALPRAZolam (XANAX) 0.5 mg tablet; Take 1 tablet (0.5 mg total) by mouth daily as needed for anxiety Do not start before 2025.    Obesity (BMI 30-39.9)  Patient interested in discussing ways to lose weight.  We discussed GLP-1 injections as well as more conservative management  through the weight loss center.  Her significant other went through one of the weight loss programs with [facility] and did well.  I recommended that the patient look outside of the GLP-1's at this time, especially since once the shots are stopped, the cravings come back and there is some times where you can gain more weight due to the lack of craving suppression.  Patient requested referral to weight management and will continue to monitor symptoms and weight.  Orders:    Ambulatory Referral to Weight Management; Future    Weight loss advised    Orders:    Ambulatory Referral to Weight Management; Future           History of Present Illness   Patient presents today to review her last 2 weeks.  Initially when I saw her in the office she was extremely anxious, planning to move to [place], and very stressed.  Patient has a significant history of FRANCO/panic attacks.  Initially when presenting she had excessive panic, anxiety, and was not sleeping.  We discussed the various options for her treatment, and decided on a short course of benzodiazepines to improve her sleep and circadian rhythm.  This in turn would likely help with her stress and panic attacks.  Initially she required 2 of the tablets at 1 time and she was taking them twice a day.  This was then reduced as her symptoms started to lessen.  She has followed with therapists in the past and she is utilizing CBT like coping mechanisms to remain in the moment.  Her father is also up in the area and distracting her from many of her stressors.  He has Parkinson's and therefore she needs to focus on him and his health.  Additionally other family members have been coming by to see him and they are keeping busy.    She is interested in learning about GLP-1s.  I reviewed pros and cons to the medication,.  She is not looking for a crutch and I informed her that this significant reduces her hunger.  Once discotninued she would likely have difficulty with the cravings.  She has  "heard of the shake system through the weight management center.  She would be open to this.           Review of Systems    Objective   /80   Pulse 81   Ht 5' 2\" (1.575 m)   Wt 90.7 kg (200 lb)   SpO2 97%   BMI 36.58 kg/m²      Physical Exam  No PE Performed  "

## 2025-03-12 RX ORDER — FUROSEMIDE 20 MG/1
20 TABLET ORAL DAILY
Qty: 90 TABLET | Refills: 0 | Status: SHIPPED | OUTPATIENT
Start: 2025-03-12

## 2025-03-24 DIAGNOSIS — R06.09 DOE (DYSPNEA ON EXERTION): ICD-10-CM

## 2025-03-25 RX ORDER — FUROSEMIDE 20 MG/1
20 TABLET ORAL DAILY
Qty: 90 TABLET | Refills: 0 | OUTPATIENT
Start: 2025-03-25

## 2025-03-27 ENCOUNTER — TELEPHONE (OUTPATIENT)
Age: 65
End: 2025-03-27

## 2025-03-27 NOTE — TELEPHONE ENCOUNTER
"Pt's spouse called to make sure that the frequency change of the furosemide 20 mg was correct. The patient was only seen by Dr. Up once and not able to find any documentation about why the furosemide was decreased from 20mg BID to daily.  What was found in the OV note from 2/20 was  ; \"I suggested a short course of Lasix to see if she has any improvement. I do suspect that she does not really have the diagnosis of heart failure.\"    Advised Roberto of this and to call back should the patient notice any increase in symptoms going from 20mg BID to daily. Roberto verbalized understanding.   "

## 2025-03-28 DIAGNOSIS — F41.0 GENERALIZED ANXIETY DISORDER WITH PANIC ATTACKS: ICD-10-CM

## 2025-03-28 DIAGNOSIS — F41.1 GENERALIZED ANXIETY DISORDER WITH PANIC ATTACKS: ICD-10-CM

## 2025-03-28 RX ORDER — HYDROXYZINE HYDROCHLORIDE 25 MG/1
TABLET, FILM COATED ORAL
Qty: 90 TABLET | Refills: 1 | Status: SHIPPED | OUTPATIENT
Start: 2025-03-28

## 2025-04-08 ENCOUNTER — DOCUMENTATION (OUTPATIENT)
Dept: ADMINISTRATIVE | Facility: OTHER | Age: 65
End: 2025-04-08

## 2025-04-08 NOTE — PROGRESS NOTES
04/08/25 3:17 PM    Annual Wellness Visit outreach is not required, patient has an upcoming appointment with the PCP office.    Thank you.  Eric Gonzalez MA  [facility]

## 2025-04-24 ENCOUNTER — OFFICE VISIT (OUTPATIENT)
Dept: BARIATRICS | Facility: CLINIC | Age: 65
End: 2025-04-24
Payer: MEDICARE

## 2025-04-24 VITALS
SYSTOLIC BLOOD PRESSURE: 120 MMHG | DIASTOLIC BLOOD PRESSURE: 79 MMHG | HEART RATE: 59 BPM | WEIGHT: 202.2 LBS | BODY MASS INDEX: 37.21 KG/M2 | HEIGHT: 62 IN

## 2025-04-24 DIAGNOSIS — E55.9 VITAMIN D DEFICIENCY: ICD-10-CM

## 2025-04-24 DIAGNOSIS — Z78.9 WEIGHT LOSS ADVISED: ICD-10-CM

## 2025-04-24 DIAGNOSIS — E66.812 CLASS 2 SEVERE OBESITY DUE TO EXCESS CALORIES WITH SERIOUS COMORBIDITY AND BODY MASS INDEX (BMI) OF 37.0 TO 37.9 IN ADULT (HCC): Primary | ICD-10-CM

## 2025-04-24 DIAGNOSIS — E03.9 HYPOTHYROIDISM, UNSPECIFIED TYPE: ICD-10-CM

## 2025-04-24 DIAGNOSIS — E66.01 CLASS 2 SEVERE OBESITY DUE TO EXCESS CALORIES WITH SERIOUS COMORBIDITY AND BODY MASS INDEX (BMI) OF 37.0 TO 37.9 IN ADULT (HCC): Primary | ICD-10-CM

## 2025-04-24 DIAGNOSIS — E78.5 HYPERLIPIDEMIA, UNSPECIFIED HYPERLIPIDEMIA TYPE: ICD-10-CM

## 2025-04-24 DIAGNOSIS — E66.9 OBESITY (BMI 30-39.9): ICD-10-CM

## 2025-04-24 DIAGNOSIS — R73.03 PRE-DIABETES: ICD-10-CM

## 2025-04-24 PROCEDURE — 99204 OFFICE O/P NEW MOD 45 MIN: CPT | Performed by: INTERNAL MEDICINE

## 2025-04-24 NOTE — ASSESSMENT & PLAN NOTE
"-  Main drivers of patients elevated body weight -  genetics, excess calories for patients basal metabolic rate, suboptimal Physical activity, skipping meals leading to slowing down of metabolic rate, larger portions, late night eating , inadequate protein leading to poor satiety, menopause, reduced sleep duration , and marijuana use, some carbohydrate heavy food choices    Class obesity with BMI  kg/m2 with weight related  comorbidities of   Antiobesity Medications/Medical --  Will defer discussion regarding antiobesity medications as patient is considering very low calorie diet      Nutrition:    Patient states that she would like to lose weight \"rapidly\" so that she can feel comfortable.  She did the VLCD in 2021 and lost 45 pounds in and was able to keep it off for a while.  She also states that she responds well to structure.  She is accompanied by her .  Her  encouraged her to consider healthy core after she transitions from the VLCD.  Patient would like to at least do 2 to 3 months on the VLCD  VLCD Review:  Contraindications: no  Labs ordered: yes  EKG ordered: no-recent EKG February 2025 reviewed.  This was within normal limits  HTN meds addressed: yes.  We discussed discontinuing Lasix  DM2 meds addressed: n/a  VLCD time restriction based on BMI: n/a  LMP/OCP: n/a   After review of labs dietitian would be notified to schedule VLCD initial start  Physical Activity:   Light physical activity while on VLCD    Sleep: -  Improve sleep duration by cutting back on caffeine and nicotine; STOP-BANG 4/8.  Could consider home sleep study    Food Behaviors/Stress/pyschosocial:   We discussed that adherence to transition diet and increased physical activity after VLCD would determine successful weight maintenance.  Patient verbalized understanding  .pn     "

## 2025-04-24 NOTE — PROGRESS NOTES
"  Assessment/Plan     Jeir Acosta is 64 y.o. year old female  who comes in for consultation for assistance with weight management.     - Discussed options of HealthyCORE-Intensive Lifestyle Intervention Program, Very Low Calorie Diet-VLCD, and Conservative Program and the role of weight loss medications.  - Patient is interested in pursuing Very Low Calorie Diet-VLCD        Class 2 severe obesity due to excess calories with serious comorbidity and body mass index (BMI) of 37.0 to 37.9 in adult (HCC)  -  Main drivers of patients elevated body weight -  genetics, excess calories for patients basal metabolic rate, suboptimal Physical activity, skipping meals leading to slowing down of metabolic rate, larger portions, late night eating , inadequate protein leading to poor satiety, menopause, reduced sleep duration , and marijuana use, some carbohydrate heavy food choices    Class obesity with BMI  kg/m2 with weight related  comorbidities of   Antiobesity Medications/Medical --  Will defer discussion regarding antiobesity medications as patient is considering very low calorie diet      Nutrition:    Patient states that she would like to lose weight \"rapidly\" so that she can feel comfortable.  She did the VLCD in 2021 and lost 45 pounds in and was able to keep it off for a while.  She also states that she responds well to structure.  She is accompanied by her .  Her  encouraged her to consider healthy core after she transitions from the VLCD.  Patient would like to at least do 2 to 3 months on the VLCD  VLCD Review:  Contraindications: no  Labs ordered: yes  EKG ordered: no-recent EKG February 2025 reviewed.  This was within normal limits  HTN meds addressed: yes.  We discussed discontinuing Lasix  DM2 meds addressed: n/a  VLCD time restriction based on BMI: n/a  LMP/OCP: n/a   After review of labs dietitian would be notified to schedule VLCD initial start  Physical Activity:   Light physical activity " while on VLCD    Sleep: -  Improve sleep duration by cutting back on caffeine and nicotine; STOP-BANG 4/8.  Could consider home sleep study    Food Behaviors/Stress/pyschosocial:   We discussed that adherence to transition diet and increased physical activity after VLCD would determine successful weight maintenance.  Patient verbalized understanding  .edward Wilcox was seen today for consult.    Diagnoses and all orders for this visit:    Class 2 severe obesity due to excess calories with serious comorbidity and body mass index (BMI) of 37.0 to 37.9 in adult (HCC)  -     Comprehensive metabolic panel; Future  -     Magnesium; Future  -     Uric acid; Future    Obesity (BMI 30-39.9)  -     Ambulatory Referral to Weight Management    Weight loss advised  -     Ambulatory Referral to Weight Management    Pre-diabetes  -     Hemoglobin A1C; Future  -     Insulin, fasting; Future    Hyperlipidemia, unspecified hyperlipidemia type  -     Lipid panel; Future    Hypothyroidism, unspecified type  -     T3, free; Future  -     TSH, 3rd generation with Free T4 reflex; Future    Vitamin D deficiency  -     Vitamin D 25 hydroxy; Future          -In addition, please follow general recommendations below.          Return visit:  6-8 weeks        General Lifestyle recommendations:    Nutrition   -Avoid skipping meals. Avoid sugary beverages. At least 64oz of water daily.  Limit processed food, refined sugars and grain. Encourage  healthy choices for meals and snacks   -Focus on protein goals and non starchy fiber rich vegetables for satiety effect and to help support a calorie deficit.   - Emphasize portion control, well balanced macronutrient's (protein, carbohydrate, fat using MyPlate method )and adequate protein with each meal/snacks and distributing calories equally throughout the day along with.   -Advise starting the day with a protein breakfast   Behavioral/Stress   Food log via arvind or provided paper log (arvind options include  "www.[employer]nesspal.com, Statzupople.com, loseit.com, [employer], baritastic). Encouraged mindful eating. Be sure to set aside time to eat, eat slowly, and savor your food. Consider meditation apps and/or taking a few minutes of mindfulness every AM. Understand the role of regarding the role of stress hormone cortisol in promoting weight gain and visceral fat accumulation. Weigh daily or atleast 2-3 times/ week  Physical Activity   Increase physical activity by 10 minutes daily. Gradually increase physical activity to a goal of 5 days per week for 30 minutes of MODERATE intensity ( should be able to pass the \"talk test\" but should not be able to sing. Target 150-300 minutes  PLUS 2 days per week of FULL BODY resistance training. Progression will be addressed at follow up visits. Encouraged contemplation regarding establishing a daily physical activity routine  - Resistance training along with increase protein intake is important to maintain and enhance metabolism  Sleep   Encourage sleep hygiene and importance of having adequate sleep duration at least > 6 hours to support response in weight loss efforts    Handouts provided :  THRIVE program at [facility]  MyPlate and food quality  Food log resources, phone arvind or paper journal  Antiobesity medications options     - Discussed at length and the role of weight loss medications and medication options   - Explained the importance of making lifestyle changes in addition to starting any anti-obesity medications if the  patient chooses.  -  Initial weight loss goal of 5-10% weight loss for improved health  - Weight loss can improve patient's co-morbid conditions and/or prevent weight-related complications.  - Weight is not at goal and patient has been unable to achieve a meaningful weight loss above 5% using various programs and tools for more than 6 months    Jeri was seen today for consult.    Diagnoses and all orders for this visit:    Class 2 severe obesity due " "to excess calories with serious comorbidity and body mass index (BMI) of 37.0 to 37.9 in adult (HCC)  -     Comprehensive metabolic panel; Future  -     Magnesium; Future  -     Uric acid; Future    Obesity (BMI 30-39.9)  -     Ambulatory Referral to Weight Management    Weight loss advised  -     Ambulatory Referral to Weight Management    Pre-diabetes  -     Hemoglobin A1C; Future  -     Insulin, fasting; Future    Hyperlipidemia, unspecified hyperlipidemia type  -     Lipid panel; Future    Hypothyroidism, unspecified type  -     T3, free; Future  -     TSH, 3rd generation with Free T4 reflex; Future    Vitamin D deficiency  -     Vitamin D 25 hydroxy; Future                      Total time spent reviewing chart, interviewing patient, examining patient, discussing plan, answering all questions, and documentin min, with >50% face-to-face time spent counseling patient on nonsurgical interventions for the treatment of excess weight. Discussed in detail nonsurgical options including intensive lifestyle intervention program, very low-calorie diet program and conservative program.  Discussed the role of weight loss medications.  Counseled patient on diet behavior and exercise modification for weight loss.        History of present illness/ Weight history       Onset--  Patient states she has struggled with her weight throughout her adult life.  She gained 80 pounds with her first pregnancy when she was diagnosed with toxemia.  She attributes lifestyle factors.  She is [place] and loves to cook.  Her  who accompanies her states that she is an excellent cook.  However during the day she often skips meals and does not snack.  She only eats a large dinner and could resort to some unhealthy choices  Fam hx of obesity- +      Previous Weight loss medications/Weight loss attempts:   \"Starving herself\"  Gym - 1.5 year ago and got off track   VLCD - lost 45 kept it off for a \" while \"   Self hypnosis - helped " her lose weight     Patient reports and other history-    PCP referred  Wt Readings from Last 30 Encounters:   04/24/25 91.7 kg (202 lb 3.2 oz)   03/10/25 90.7 kg (200 lb)   02/25/25 92.1 kg (203 lb)   02/20/25 92.1 kg (203 lb 1.6 oz)   10/17/24 89.4 kg (197 lb)   10/02/24 87.5 kg (193 lb)   10/01/24 87.5 kg (193 lb)   09/25/24 87.5 kg (193 lb)   07/31/24 87.1 kg (192 lb)   05/14/24 86.2 kg (190 lb 0.6 oz)   02/19/24 81.6 kg (180 lb)   08/29/23 77.1 kg (170 lb)   08/09/23 77.1 kg (170 lb)   08/08/23 80.3 kg (177 lb)   06/27/23 80.3 kg (177 lb)   06/13/23 77.1 kg (170 lb)   04/17/23 80.6 kg (177 lb 12.8 oz)   02/01/23 80.3 kg (177 lb)   06/21/22 80.6 kg (177 lb 9.6 oz)   04/23/21 83.4 kg (183 lb 12.8 oz)   04/09/21 85 kg (187 lb 4.8 oz)   03/25/21 90.7 kg (200 lb)   03/17/21 90.7 kg (200 lb)   02/25/21 88 kg (193 lb 14.4 oz)   02/10/21 91.2 kg (201 lb)   01/05/21 83.9 kg (185 lb)   01/04/21 83.9 kg (185 lb)   11/03/20 88.7 kg (195 lb 9.6 oz)   08/19/20 85.9 kg (189 lb 6.4 oz)   07/24/20 86.2 kg (190 lb)    BMI Readings from Last 30 Encounters:   04/24/25 37.59 kg/m²   03/10/25 36.58 kg/m²   02/25/25 37.13 kg/m²   02/20/25 37.15 kg/m²   10/17/24 36.03 kg/m²   10/02/24 35.30 kg/m²   10/01/24 35.30 kg/m²   09/25/24 35.30 kg/m²   07/31/24 35.12 kg/m²   05/14/24 34.76 kg/m²   02/19/24 32.92 kg/m²   11/14/23 31.09 kg/m²   08/29/23 31.09 kg/m²   08/09/23 31.09 kg/m²   08/08/23 32.37 kg/m²   06/27/23 32.37 kg/m²   06/13/23 31.09 kg/m²   04/17/23 31.50 kg/m²   02/01/23 31.35 kg/m²   06/21/22 31.46 kg/m²   04/23/21 33.95 kg/m²   04/09/21 34.59 kg/m²   03/25/21 36.94 kg/m²   03/17/21 36.94 kg/m²   02/25/21 35.81 kg/m²   02/10/21 35.61 kg/m²   01/05/21 32.77 kg/m²   01/04/21 32.77 kg/m²   11/03/20 34.65 kg/m²   08/19/20 33.55 kg/m²                   Lifestyle questionnaire       Diet recall:  B: skips  S:  L: skips  S:  D: large dinner italian chicken pork chops lot of bread   S: ?  Dessert    Frequency Eating out x/ week: 1-2    "x/week    Food behaviors--\"head\" hunger/cravings, stress/emotional eating, and larger portions/seconds      Trouble area of the day: .Evening    Beverages  Water-- 64 oz   Caffeine/tea-- 36 oz   SSB --no or     Alcohol: no drinks/ week   Smoking: vape everyday - was almost 2 ppd   Drug use: vaping 3 x/ week     Social History     Substance and Sexual Activity   Alcohol Use Yes    Alcohol/week: 1.0 standard drink of alcohol    Comment: Rare- 1 every few weeks       Social History     Tobacco Use   Smoking Status Every Day    Current packs/day: 0.50    Average packs/day: 0.6 packs/day for 63.3 years (40.4 ttl pk-yrs)    Types: Cigarettes    Start date: 1/4/1980   Smokeless Tobacco Never   Tobacco Comments    Pt states vapes everyday      Social History     Substance and Sexual Activity   Drug Use Yes    Frequency: 7.0 times per week    Types: Marijuana    Comment: Vapes 6 times monthly         Physical Activity --chasing her grandchild    Sleep -- Stop bang: Score: 4 / 8  ; 5-6 hours of sleep per night some snoring     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  Obesity Class: 35.0-39.9- Obesity Class II  Goal weight: 145  lbs     Waist circumference (inches): 40 Inches    Colonoscopy: 06/21/2023   Mammogram: 08/29/2023    Medication considerations/contraindications     -Patient denies personal history of pancreatitis. Patient also denies personal and family history of medullary thyroid cancer, and multiple endocrine neoplasia type 2 (MEN 2 tumor). -Patient denies any history of kidney stones, seizures, or glaucoma, diabetic retinopathy, gall bladder disease, gastroparesis, hyperthyroidism.  -Denies Hx of CAD, PAD, palpitations, arrhythmia, uncontrolled " "hypertension  -Denies uncontrolled anxiety or depression, suicidal behavior or thinking , insomnia or sleep disturbance.         Past medical history/past surgical history       Previous notes and records have been reviewed.    The following portions of the patient's history were reviewed and updated as appropriate: allergies, current medications, past family history, past medical history, past social history, past surgical history, and problem list.    Past Medical History:   Diagnosis Date    Anxiety     Anxiety     Bunion     Colon polyp     Difficulty walking     Leg swelling 2023    Mood disorder (HCC) 2014    Panic attacks     Thyroid disease     Visual impairment          Past Surgical History:   Procedure Laterality Date     SECTION      COLONOSCOPY      HYSTERECTOMY      TUBAL LIGATION  82             Family History   Problem Relation Age of Onset    Lung cancer Mother     Cancer Mother     Substance Abuse Brother     Asthma Son     Hypothyroidism Daughter     Thyroid disease Daughter     Ovarian cancer Maternal Grandmother     Parkinsonism Maternal Grandfather     Glaucoma Paternal Grandmother     Ulcerative colitis Paternal Grandmother     Alcohol abuse Neg Hx     Depression Neg Hx     Drug abuse Neg Hx     Diabetes Neg Hx     Hypertension Neg Hx     Heart disease Neg Hx     Stroke Neg Hx             Objective     /79 (Patient Position: Sitting, Cuff Size: Large)   Pulse 59   Ht 5' 1.5\" (1.562 m)   Wt 91.7 kg (202 lb 3.2 oz)   BMI 37.59 kg/m²     Review of Systems   Constitutional:  Negative for chills, fatigue and fever.   HENT:  Negative for ear pain and sore throat.    Eyes:  Negative for pain and visual disturbance.   Respiratory:  Negative for cough and shortness of breath.    Cardiovascular:  Negative for chest pain, palpitations and leg swelling.   Gastrointestinal:  Negative for abdominal pain, constipation, diarrhea, nausea and vomiting.   Genitourinary: "  Negative for dysuria and hematuria.   Musculoskeletal:  Negative for arthralgias and back pain.   Skin:  Negative for color change and rash.   Neurological:  Negative for seizures, syncope and headaches.   Psychiatric/Behavioral:  Negative for dysphoric mood. The patient is not nervous/anxious.    All other systems reviewed and are negative.    Physical Exam  Vitals and nursing note reviewed.   Constitutional:       Appearance: Normal appearance.   HENT:      Head: Normocephalic.   Pulmonary:      Effort: Pulmonary effort is normal.   Neurological:      General: No focal deficit present.      Mental Status: He is alert and oriented to person, place, and time.   Psychiatric:         Mood and Affect: Mood normal.         Behavior: Behavior normal.         Thought Content: Thought content normal.         Judgment: Judgment normal.         Medications       Current Outpatient Medications:     ALPRAZolam (XANAX) 0.5 mg tablet, Take 1 tablet (0.5 mg total) by mouth daily as needed for anxiety Do not start before March 17, 2025., Disp: 30 tablet, Rfl: 0    amphetamine-dextroamphetamine (ADDERALL) 10 mg tablet, Take 10 mg by mouth 2 (two) times a day, Disp: , Rfl:     atorvastatin (LIPITOR) 10 mg tablet, TAKE 1 TABLET(10 MG) BY MOUTH DAILY, Disp: 30 tablet, Rfl: 5    escitalopram (LEXAPRO) 10 mg tablet, TAKE 1/2 TABLET(5 MG) BY MOUTH DAILY FOR 7 DAYS THEN TAKE 1 TABLET(10 MG) BY MOUTH DAILY, Disp: 60 tablet, Rfl: 5    furosemide (LASIX) 20 mg tablet, Take 1 tablet (20 mg total) by mouth daily, Disp: 90 tablet, Rfl: 0    hydrOXYzine HCL (ATARAX) 25 mg tablet, TAKE 1 TABLET(25 MG) BY MOUTH UP TO THREE TIMES DAILY AS NEEDED FOR ANXIETY OR PANIC ATTACKS, Disp: 90 tablet, Rfl: 1    levothyroxine 100 mcg tablet, TAKE 1 TABLET(100 MCG) BY MOUTH DAILY EARLY MORNING, Disp: 90 tablet, Rfl: 1    Linzess 145 MCG CAPS, Take 145 mcg by mouth daily, Disp: , Rfl:     mesalamine (LIALDA) 1.2 g EC tablet, Take 2 tablets by mouth in the  morning, Disp: , Rfl:     Multiple Vitamins-Minerals (multivitamin with minerals) tablet, Take 1 tablet by mouth daily, Disp: , Rfl:     Ventolin  (90 Base) MCG/ACT inhaler, INHALE 2 PUFFS BY MOUTH EVERY 4 HOURS AS NEEDED FOR WHEEZING, Disp: 18 g, Rfl: 0    traZODone (DESYREL) 100 mg tablet, TAKE 1 TABLET(100 MG) BY MOUTH DAILY AT BEDTIME AS NEEDED FOR SLEEP (Patient not taking: Reported on 4/24/2025), Disp: 90 tablet, Rfl: 0           Labs and imaging     Recent labs and imaging have been personally reviewed.  Lab Results   Component Value Date    WBC 5.66 05/14/2024    HGB 12.8 05/14/2024    HCT 39.0 05/14/2024    MCV 84 05/14/2024     05/14/2024     Lab Results   Component Value Date     08/04/2014    SODIUM 141 12/30/2024    K 4.0 12/30/2024     12/30/2024    CO2 29 12/30/2024    ANIONGAP 3 (L) 08/04/2014    AGAP 7 12/30/2024    BUN 19 12/30/2024    CREATININE 0.92 12/30/2024    GLUC 83 05/14/2024    GLUF 77 12/30/2024    CALCIUM 9.1 12/30/2024    AST 22 12/30/2024    ALT 23 12/30/2024    ALKPHOS 59 12/30/2024    PROT 7.8 08/04/2014    TP 7.1 12/30/2024    BILITOT 0.2 08/04/2014    TBILI 0.40 12/30/2024    EGFR 65 12/30/2024     Lab Results   Component Value Date    HGBA1C 5.4 03/08/2021     Lab Results   Component Value Date    CVS0MSSNHIXQ 3.183 05/14/2024    TSH 3.760 12/02/2020     Lab Results   Component Value Date    CHOLESTEROL 177 12/30/2024     Lab Results   Component Value Date    HDL 76 12/30/2024     Lab Results   Component Value Date    TRIG 59 12/30/2024     Lab Results   Component Value Date    LDLCALC 89 12/30/2024     Vit D, 25-Hydroxy   Date Value Ref Range Status   02/19/2024 28.3 (L) 30.0 - 100.0 ng/mL Final     Comment:     Vitamin D guidelines established by Clinical Guidelines Subcommittee  of the Endocrine Society Task Force, 2011    Deficiency <20ng/ml   Insufficiency 20-30ng/ml   Sufficient  ng/ml      Insulin, Fasting   Date Value Ref Range Status    03/08/2021 6.6 3.0 - 25.0 mU/L Final

## 2025-04-25 ENCOUNTER — APPOINTMENT (OUTPATIENT)
Dept: LAB | Facility: CLINIC | Age: 65
End: 2025-04-25
Payer: MEDICARE

## 2025-04-25 DIAGNOSIS — E55.9 VITAMIN D DEFICIENCY: ICD-10-CM

## 2025-04-25 DIAGNOSIS — E66.01 CLASS 2 SEVERE OBESITY DUE TO EXCESS CALORIES WITH SERIOUS COMORBIDITY AND BODY MASS INDEX (BMI) OF 37.0 TO 37.9 IN ADULT (HCC): ICD-10-CM

## 2025-04-25 DIAGNOSIS — E78.5 HYPERLIPIDEMIA, UNSPECIFIED HYPERLIPIDEMIA TYPE: ICD-10-CM

## 2025-04-25 DIAGNOSIS — R73.03 PRE-DIABETES: ICD-10-CM

## 2025-04-25 DIAGNOSIS — E03.9 HYPOTHYROIDISM, UNSPECIFIED TYPE: ICD-10-CM

## 2025-04-25 DIAGNOSIS — E66.812 CLASS 2 SEVERE OBESITY DUE TO EXCESS CALORIES WITH SERIOUS COMORBIDITY AND BODY MASS INDEX (BMI) OF 37.0 TO 37.9 IN ADULT (HCC): ICD-10-CM

## 2025-04-25 LAB
25(OH)D3 SERPL-MCNC: 28 NG/ML (ref 30–100)
ALBUMIN SERPL BCG-MCNC: 4.2 G/DL (ref 3.5–5)
ALP SERPL-CCNC: 61 U/L (ref 34–104)
ALT SERPL W P-5'-P-CCNC: 17 U/L (ref 7–52)
ANION GAP SERPL CALCULATED.3IONS-SCNC: 9 MMOL/L (ref 4–13)
AST SERPL W P-5'-P-CCNC: 18 U/L (ref 13–39)
BILIRUB SERPL-MCNC: 0.43 MG/DL (ref 0.2–1)
BUN SERPL-MCNC: 17 MG/DL (ref 5–25)
CALCIUM SERPL-MCNC: 9.3 MG/DL (ref 8.4–10.2)
CHLORIDE SERPL-SCNC: 105 MMOL/L (ref 96–108)
CHOLEST SERPL-MCNC: 176 MG/DL (ref ?–200)
CO2 SERPL-SCNC: 28 MMOL/L (ref 21–32)
CREAT SERPL-MCNC: 0.89 MG/DL (ref 0.6–1.3)
EST. AVERAGE GLUCOSE BLD GHB EST-MCNC: 117 MG/DL
GFR SERPL CREATININE-BSD FRML MDRD: 67 ML/MIN/1.73SQ M
GLUCOSE P FAST SERPL-MCNC: 88 MG/DL (ref 65–99)
HBA1C MFR BLD: 5.7 %
HDLC SERPL-MCNC: 75 MG/DL
INSULIN SERPL-ACNC: 5.79 UIU/ML (ref 1.9–23)
LDLC SERPL CALC-MCNC: 87 MG/DL (ref 0–100)
MAGNESIUM SERPL-MCNC: 2 MG/DL (ref 1.9–2.7)
NONHDLC SERPL-MCNC: 101 MG/DL
POTASSIUM SERPL-SCNC: 4.2 MMOL/L (ref 3.5–5.3)
PROT SERPL-MCNC: 7 G/DL (ref 6.4–8.4)
SODIUM SERPL-SCNC: 142 MMOL/L (ref 135–147)
T3FREE SERPL-MCNC: 2.84 PG/ML (ref 2.5–3.9)
T4 FREE SERPL-MCNC: 0.78 NG/DL (ref 0.61–1.12)
TRIGL SERPL-MCNC: 69 MG/DL (ref ?–150)
TSH SERPL DL<=0.05 MIU/L-ACNC: 10.92 UIU/ML (ref 0.45–4.5)
URATE SERPL-MCNC: 4.9 MG/DL (ref 2–7.5)

## 2025-04-25 PROCEDURE — 82306 VITAMIN D 25 HYDROXY: CPT

## 2025-04-25 PROCEDURE — 83036 HEMOGLOBIN GLYCOSYLATED A1C: CPT

## 2025-04-25 PROCEDURE — 80061 LIPID PANEL: CPT

## 2025-04-25 PROCEDURE — 84550 ASSAY OF BLOOD/URIC ACID: CPT

## 2025-04-25 PROCEDURE — 83735 ASSAY OF MAGNESIUM: CPT

## 2025-04-25 PROCEDURE — 84439 ASSAY OF FREE THYROXINE: CPT

## 2025-04-25 PROCEDURE — 84481 FREE ASSAY (FT-3): CPT

## 2025-04-25 PROCEDURE — 80053 COMPREHEN METABOLIC PANEL: CPT

## 2025-04-25 PROCEDURE — 83525 ASSAY OF INSULIN: CPT

## 2025-04-25 PROCEDURE — 36415 COLL VENOUS BLD VENIPUNCTURE: CPT

## 2025-04-25 PROCEDURE — 84443 ASSAY THYROID STIM HORMONE: CPT

## 2025-04-28 ENCOUNTER — RESULTS FOLLOW-UP (OUTPATIENT)
Dept: BARIATRICS | Facility: CLINIC | Age: 65
End: 2025-04-28

## 2025-04-28 ENCOUNTER — RA CDI HCC (OUTPATIENT)
Dept: OTHER | Facility: HOSPITAL | Age: 65
End: 2025-04-28

## 2025-04-30 DIAGNOSIS — E03.9 ACQUIRED HYPOTHYROIDISM: ICD-10-CM

## 2025-04-30 RX ORDER — LEVOTHYROXINE SODIUM 100 UG/1
TABLET ORAL
Qty: 90 TABLET | Refills: 1 | Status: SHIPPED | OUTPATIENT
Start: 2025-04-30

## 2025-05-06 ENCOUNTER — OFFICE VISIT (OUTPATIENT)
Dept: FAMILY MEDICINE CLINIC | Facility: CLINIC | Age: 65
End: 2025-05-06
Payer: MEDICARE

## 2025-05-06 VITALS
DIASTOLIC BLOOD PRESSURE: 82 MMHG | WEIGHT: 205 LBS | HEIGHT: 62 IN | OXYGEN SATURATION: 97 % | BODY MASS INDEX: 37.73 KG/M2 | SYSTOLIC BLOOD PRESSURE: 122 MMHG | HEART RATE: 77 BPM

## 2025-05-06 DIAGNOSIS — F33.1 MAJOR DEPRESSIVE DISORDER, RECURRENT EPISODE, MODERATE (HCC): ICD-10-CM

## 2025-05-06 DIAGNOSIS — F41.1 GENERALIZED ANXIETY DISORDER WITH PANIC ATTACKS: ICD-10-CM

## 2025-05-06 DIAGNOSIS — F41.0 GENERALIZED ANXIETY DISORDER WITH PANIC ATTACKS: ICD-10-CM

## 2025-05-06 DIAGNOSIS — F51.01 PRIMARY INSOMNIA: ICD-10-CM

## 2025-05-06 PROCEDURE — G2211 COMPLEX E/M VISIT ADD ON: HCPCS | Performed by: FAMILY MEDICINE

## 2025-05-06 PROCEDURE — 99213 OFFICE O/P EST LOW 20 MIN: CPT | Performed by: FAMILY MEDICINE

## 2025-05-06 RX ORDER — ESCITALOPRAM OXALATE 20 MG/1
20 TABLET ORAL DAILY
Qty: 30 TABLET | Refills: 1 | Status: SHIPPED | OUTPATIENT
Start: 2025-05-06

## 2025-05-06 RX ORDER — ALPRAZOLAM 0.5 MG
0.5 TABLET ORAL DAILY PRN
Qty: 30 TABLET | Refills: 0 | Status: SHIPPED | OUTPATIENT
Start: 2025-05-06
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
## 2025-05-06 NOTE — ASSESSMENT & PLAN NOTE
See generalized anxiety disorder  Orders:    ALPRAZolam (XANAX) 0.5 mg tablet; Take 1 tablet (0.5 mg total) by mouth daily as needed for anxiety    escitalopram (LEXAPRO) 20 mg tablet; Take 1 tablet (20 mg total) by mouth daily

## 2025-05-06 NOTE — ASSESSMENT & PLAN NOTE
Patient presents today to follow-up for mental health concerns as well as insomnia.  Unfortunately she was unable to tolerate the trazodone that was previously prescribed for her insomnia.  She has been using Xanax intermittently, but we did review today that this will not be a chronic medication and this will likely be one of the last few prescriptions if the patient is able to manage her stress a little bit better after stopping her job and trying to go through some type of therapy with family.  Her mood has improved with the use of Lexapro, we will increase to max dose of 20 mg and see how the patient fares.  The hope is that she will improve with energy, decrease stress, and better ability to cope with issues at home.  She is to discontinue trazodone since she was unable to tolerate this, and focus on the 2 medications that she has right now.  If she is having issues with sleep, may need to consider mood stabilizer such as Seroquel or other medication such as Lamictal or Abilify.  Orders:    ALPRAZolam (XANAX) 0.5 mg tablet; Take 1 tablet (0.5 mg total) by mouth daily as needed for anxiety

## 2025-05-06 NOTE — ASSESSMENT & PLAN NOTE
C generalized anxiety disorder  Orders:    ALPRAZolam (XANAX) 0.5 mg tablet; Take 1 tablet (0.5 mg total) by mouth daily as needed for anxiety

## 2025-05-15 ENCOUNTER — TELEPHONE (OUTPATIENT)
Dept: BARIATRICS | Facility: CLINIC | Age: 65
End: 2025-05-15

## 2025-05-15 ENCOUNTER — TELEPHONE (OUTPATIENT)
Age: 65
End: 2025-05-15

## 2025-05-15 DIAGNOSIS — F41.1 GENERALIZED ANXIETY DISORDER WITH PANIC ATTACKS: ICD-10-CM

## 2025-05-15 DIAGNOSIS — F41.0 GENERALIZED ANXIETY DISORDER WITH PANIC ATTACKS: ICD-10-CM

## 2025-05-15 RX ORDER — HYDROXYZINE HYDROCHLORIDE 25 MG/1
TABLET, FILM COATED ORAL
Qty: 90 TABLET | Refills: 1 | Status: SHIPPED | OUTPATIENT
Start: 2025-05-15

## 2025-05-15 NOTE — TELEPHONE ENCOUNTER
Patient had her bloodwork done about 2 weeks ago and would like to start the VLCD as soon as possible.   Patient is requesting you to review her labs and let her know if she can start the VLC.

## 2025-05-15 NOTE — TELEPHONE ENCOUNTER
Patients GI provider:      Number to return call: ([phone]     Reason for call: Pt returning a call for an appt tomorrow , verified it is with weight management. Transferred the call to that office to assist.     Scheduled procedure/appointment date if applicable:

## 2025-05-15 NOTE — TELEPHONE ENCOUNTER
Patient calling and wanted to see if Dr Walker would review all of blood work that came back and she wanted to let him know the medication dosage for escitalopram that was upped is working well with no issues.   YADIRA

## 2025-05-19 NOTE — TELEPHONE ENCOUNTER
Left message that she was scheduled with Dr. Walker on 5/20 @ 3.  Please call the office to confirm appt.

## 2025-05-20 ENCOUNTER — OFFICE VISIT (OUTPATIENT)
Dept: FAMILY MEDICINE CLINIC | Facility: CLINIC | Age: 65
End: 2025-05-20
Payer: MEDICARE

## 2025-05-20 VITALS
HEIGHT: 62 IN | BODY MASS INDEX: 37.73 KG/M2 | SYSTOLIC BLOOD PRESSURE: 122 MMHG | WEIGHT: 205 LBS | DIASTOLIC BLOOD PRESSURE: 72 MMHG | HEART RATE: 78 BPM | OXYGEN SATURATION: 98 %

## 2025-05-20 DIAGNOSIS — E03.9 ACQUIRED HYPOTHYROIDISM: ICD-10-CM

## 2025-05-20 DIAGNOSIS — R73.03 PREDIABETES: ICD-10-CM

## 2025-05-20 DIAGNOSIS — F41.0 GENERALIZED ANXIETY DISORDER WITH PANIC ATTACKS: ICD-10-CM

## 2025-05-20 DIAGNOSIS — F33.1 MAJOR DEPRESSIVE DISORDER, RECURRENT EPISODE, MODERATE (HCC): Primary | ICD-10-CM

## 2025-05-20 DIAGNOSIS — Z78.9 WEIGHT LOSS ADVISED: ICD-10-CM

## 2025-05-20 DIAGNOSIS — E66.9 OBESITY (BMI 30-39.9): ICD-10-CM

## 2025-05-20 DIAGNOSIS — F41.1 GENERALIZED ANXIETY DISORDER WITH PANIC ATTACKS: ICD-10-CM

## 2025-05-20 DIAGNOSIS — F51.01 PRIMARY INSOMNIA: ICD-10-CM

## 2025-05-20 PROCEDURE — G2211 COMPLEX E/M VISIT ADD ON: HCPCS | Performed by: FAMILY MEDICINE

## 2025-05-20 PROCEDURE — 99213 OFFICE O/P EST LOW 20 MIN: CPT | Performed by: FAMILY MEDICINE

## 2025-05-20 RX ORDER — LEVOTHYROXINE SODIUM 112 UG/1
112 TABLET ORAL DAILY
Qty: 90 TABLET | Refills: 1 | Status: SHIPPED | OUTPATIENT
Start: 2025-05-20

## 2025-05-20 NOTE — ASSESSMENT & PLAN NOTE
Patient had a mildly elevated TSH.  Her T4/active hormone is within normal limits since she has crossed a threshold over 10, will increase the dose of her medication from 100 mcg daily to 112 mcg daily. Will follow up labs in 3 months.    Orders:    levothyroxine (Synthroid) 112 mcg tablet; Take 1 tablet (112 mcg total) by mouth daily    TSH + Free T4; Future

## 2025-05-20 NOTE — ASSESSMENT & PLAN NOTE
Patient has been doing much better per significant other and the patient herself.  She has been able to cope with things a lot better since starting the Lexapro, and there has been an improvement with the increase from 10 mg to 20 mg of the Cipro.  Will continue with current dosing.  She is to use Atarax as needed for anxiety or concern for increased panic.  If for any reason she has a full-blown panic attack she may utilize the Xanax that she previously had.  She has discontinued using Xanax the best she can, she will hold onto the last few that she has for traveling and/or flying.  She will reach out if there is any significant concerns or changes to her mental health.

## 2025-05-20 NOTE — PROGRESS NOTES
Name: Jeri Acosta      : 1960      MRN: [id number]  Encounter Provider: Jordy Walker DO  Encounter Date: 2025   Encounter department: [facility] [place]  :  Assessment & Plan  Major depressive disorder, recurrent episode, moderate (HCC)  Patient has been doing much better per significant other and the patient herself.  She has been able to cope with things a lot better since starting the Lexapro, and there has been an improvement with the increase from 10 mg to 20 mg of the Cipro.  Will continue with current dosing.  She is to use Atarax as needed for anxiety or concern for increased panic.  If for any reason she has a full-blown panic attack she may utilize the Xanax that she previously had.  She has discontinued using Xanax the best she can, she will hold onto the last few that she has for traveling and/or flying.  She will reach out if there is any significant concerns or changes to her mental health.       Primary insomnia  Improved with atarax.  Patient has been doing better overall with her Lexapro 20mg.  Will continue at current dosing.        Generalized anxiety disorder with panic attacks  See MDD       Acquired hypothyroidism  Patient had a mildly elevated TSH.  Her T4/active hormone is within normal limits since she has crossed a threshold over 10, will increase the dose of her medication from 100 mcg daily to 112 mcg daily. Will follow up labs in 3 months.    Orders:    levothyroxine (Synthroid) 112 mcg tablet; Take 1 tablet (112 mcg total) by mouth daily    TSH + Free T4; Future    Obesity (BMI 30-39.9)  Patient is attempting to get into the [facility]'s weight loss program and shake program.  I feel that she is able to start the shake program and continue to monitor her thyroid.  Orders placed for change in dose and follow up in 3 months.           Weight loss advised  See obesity        Prediabetes  Patient is will be attempting to reduce her weight through  "shake program and with shakes she is confident that the carb intake will decrease.  I recommended we follow up 3 months for lab work to recheck and ensure A1c is not increasing.    Orders:    Hemoglobin A1C; Future           History of Present Illness   Patient presents today for follow up labs.  Patient is interested in starting the shake program through bariatrics.  They recommended seeing me prior to starting the shake program 2/2 to thyroid function and TSH.  The patient admits that she is not taking the ,medication every day.  There was also a mild increase in her A1c.  She is currently in a prediabetic range.  The rest of the labs overall were within normal limits.        Review of Systems  Patient denies any new fever, chills, nausea, vomiting, lightheadedness, dizziness, chest pain, shortness of breath.    Objective   /72   Pulse 78   Ht 5' 1.5\" (1.562 m)   Wt 93 kg (205 lb)   SpO2 98%   BMI 38.11 kg/m²      Physical Exam  No PE performed  "

## 2025-05-20 NOTE — ASSESSMENT & PLAN NOTE
Improved with atarax.  Patient has been doing better overall with her Lexapro 20mg.  Will continue at current dosing.

## 2025-06-13 DIAGNOSIS — R06.09 DOE (DYSPNEA ON EXERTION): ICD-10-CM

## 2025-06-13 RX ORDER — FUROSEMIDE 20 MG/1
20 TABLET ORAL DAILY
Qty: 90 TABLET | Refills: 1 | Status: SHIPPED | OUTPATIENT
Start: 2025-06-13

## 2025-06-14 DIAGNOSIS — F33.1 MAJOR DEPRESSIVE DISORDER, RECURRENT EPISODE, MODERATE (HCC): ICD-10-CM

## 2025-06-15 RX ORDER — ESCITALOPRAM OXALATE 20 MG/1
20 TABLET ORAL DAILY
Qty: 30 TABLET | Refills: 5 | Status: SHIPPED | OUTPATIENT
Start: 2025-06-15

## 2025-06-24 ENCOUNTER — OFFICE VISIT (OUTPATIENT)
Dept: FAMILY MEDICINE CLINIC | Facility: CLINIC | Age: 65
End: 2025-06-24
Payer: MEDICARE

## 2025-06-24 VITALS
HEART RATE: 77 BPM | DIASTOLIC BLOOD PRESSURE: 72 MMHG | BODY MASS INDEX: 37.91 KG/M2 | HEIGHT: 62 IN | OXYGEN SATURATION: 96 % | WEIGHT: 206 LBS | SYSTOLIC BLOOD PRESSURE: 118 MMHG

## 2025-06-24 DIAGNOSIS — F51.01 PRIMARY INSOMNIA: ICD-10-CM

## 2025-06-24 DIAGNOSIS — Z78.9 WEIGHT LOSS ADVISED: ICD-10-CM

## 2025-06-24 DIAGNOSIS — F33.1 MAJOR DEPRESSIVE DISORDER, RECURRENT EPISODE, MODERATE (HCC): Primary | ICD-10-CM

## 2025-06-24 DIAGNOSIS — F41.0 GENERALIZED ANXIETY DISORDER WITH PANIC ATTACKS: ICD-10-CM

## 2025-06-24 DIAGNOSIS — E66.9 OBESITY (BMI 30-39.9): ICD-10-CM

## 2025-06-24 DIAGNOSIS — F41.1 GENERALIZED ANXIETY DISORDER WITH PANIC ATTACKS: ICD-10-CM

## 2025-06-24 DIAGNOSIS — R73.03 PREDIABETES: ICD-10-CM

## 2025-06-24 PROBLEM — M76.60 ACHILLES TENDONITIS: Status: RESOLVED | Noted: 2023-08-01 | Resolved: 2025-06-24

## 2025-06-24 PROCEDURE — 99213 OFFICE O/P EST LOW 20 MIN: CPT | Performed by: FAMILY MEDICINE

## 2025-06-24 PROCEDURE — G2211 COMPLEX E/M VISIT ADD ON: HCPCS | Performed by: FAMILY MEDICINE

## 2025-06-24 NOTE — ASSESSMENT & PLAN NOTE
Patient still struggles with FRANCO with panic attacks.  She is attempting to keep her symptoms under proper control.  We are going to continue with Lexapro 20 mg daily.  Patient would be open to changing medication if necessary, she feels that she is missing some component of her treatment that is not fully treated.  I informed her that I would be hesitant to stop the Lexapro since she has done so well on it and her anxiety when she comes into the office/tearful affect has significantly improved.  I also discussed that I am not a psychiatrist and would be hesitant to adding other medications such as Abilify or other mood stabilizing medication.  I would highly recommend that the patient follow-up with psychiatry to have this evaluated.  Same thing goes with insomnia.  I brought up Seroquel, but the patient has already attempted this medication with poor outcomes.  It looks like she was also on trazodone before as well, unknown why this was discontinued.  Will need to review more fully with follow-up appointment.

## 2025-06-24 NOTE — PROGRESS NOTES
Name: Jeri Acosta      : 1960      MRN: [id number]  Encounter Provider: Jordy Walker DO  Encounter Date: 2025   Encounter department: [facility] [place]  :  Assessment & Plan  Major depressive disorder, recurrent episode, moderate (HCC)  Primary insomnia  Generalized anxiety disorder with panic attacks  Patient still struggles with FRANCO with panic attacks.  She is attempting to keep her symptoms under proper control.  We are going to continue with Lexapro 20 mg daily.  Patient would be open to changing medication if necessary, she feels that she is missing some component of her treatment that is not fully treated.  I informed her that I would be hesitant to stop the Lexapro since she has done so well on it and her anxiety when she comes into the office/tearful affect has significantly improved.  I also discussed that I am not a psychiatrist and would be hesitant to adding other medications such as Abilify or other mood stabilizing medication.  I would highly recommend that the patient follow-up with psychiatry to have this evaluated.  Same thing goes with insomnia.  I brought up Seroquel, but the patient has already attempted this medication with poor outcomes.  It looks like she was also on trazodone before as well, unknown why this was discontinued.  Will need to review more fully with follow-up appointment.       Weight loss advised  Prediabetes  Obesity (BMI 30-39.9)  Patient is going through weight management and their shake program/meal replacement.  She will be putting forth as much effort as she can in the next 2 weeks to reduce her weight, she does not see any improvement, she may be following up in the office to discuss GLP-1 or other options for weight loss.           History of Present Illness   Patient presents today to follow up for mental health and weight management.  The patient is accompanied by her grandson so we were limited in the ability to discuss  "openly about some the the mental health concerns since they have to do with the child's parent and her son.  She has been attempting to cope with the increased stress at home with the lexapro 20 mg daily.  She has tolerated the increase from 10 mg to 20 mg well and without significant side effects.  She also continues the hydroxyzine 25mg up to three times a day depending on severity of anxiety and the situations at home.  She has been using the Xanax more frequently due to the severity of anxiety and inability to remove herself from the situation at home.  The issue is she is currently babysitting her grandson, unfortunately much of the angst is between the parents that are also staying in the home, before there is no good outlet.  She has been looking to restart work in her kitchen by performing services for cooking out of her kitchen.  She is cutting through some of the red tape and hopefully will have this up and running to help with her stress levels.  She is looking forward to a vacation that is coming up in several weeks.    She also notes that despite going in starting the shake program through [facility] weight management, she has not truly embraced this type of lifestyle and has not lost any weight.  She feels that she will give herself the next 2 weeks to focus and see how much weight she can lose.  If this is not possible or it is difficult, the patient would like to follow-up with me in the office to talk about GLP-1 this was discussed previously, but she opted to avoid using the shot at this time.      Review of Systems    Objective   /72   Pulse 77   Ht 5' 1.5\" (1.562 m)   Wt 93.4 kg (206 lb)   SpO2 96%   BMI 38.29 kg/m²      Physical Exam  Constitutional:       General: She is in acute distress (increased anxiety, more anxiousness compared to prior visits.).      Appearance: Normal appearance. She is obese. She is not ill-appearing, toxic-appearing or diaphoretic.   HENT:      Head: " Normocephalic and atraumatic.     Cardiovascular:      Rate and Rhythm: Normal rate and regular rhythm.      Heart sounds: Normal heart sounds. No murmur heard.     No friction rub. No gallop.   Pulmonary:      Effort: Pulmonary effort is normal. No respiratory distress.      Breath sounds: Normal breath sounds. No wheezing.     Skin:     General: Skin is warm.      Capillary Refill: Capillary refill takes less than 2 seconds.     Neurological:      Mental Status: She is alert.

## 2025-07-12 DIAGNOSIS — E78.00 PURE HYPERCHOLESTEROLEMIA: ICD-10-CM

## 2025-07-12 DIAGNOSIS — F41.0 GENERALIZED ANXIETY DISORDER WITH PANIC ATTACKS: ICD-10-CM

## 2025-07-12 DIAGNOSIS — F41.1 GENERALIZED ANXIETY DISORDER WITH PANIC ATTACKS: ICD-10-CM

## 2025-07-13 RX ORDER — HYDROXYZINE HYDROCHLORIDE 25 MG/1
TABLET, FILM COATED ORAL
Qty: 90 TABLET | Refills: 1 | Status: SHIPPED | OUTPATIENT
Start: 2025-07-13

## 2025-07-13 RX ORDER — ATORVASTATIN CALCIUM 10 MG/1
10 TABLET, FILM COATED ORAL DAILY
Qty: 30 TABLET | Refills: 5 | Status: SHIPPED | OUTPATIENT
Start: 2025-07-13

## 2025-07-24 ENCOUNTER — RA CDI HCC (OUTPATIENT)
Dept: OTHER | Facility: HOSPITAL | Age: 65
End: 2025-07-24

## 2025-07-31 ENCOUNTER — OFFICE VISIT (OUTPATIENT)
Dept: FAMILY MEDICINE CLINIC | Facility: CLINIC | Age: 65
End: 2025-07-31
Payer: MEDICARE

## 2025-07-31 VITALS
BODY MASS INDEX: 38.83 KG/M2 | HEART RATE: 81 BPM | OXYGEN SATURATION: 97 % | SYSTOLIC BLOOD PRESSURE: 120 MMHG | WEIGHT: 211 LBS | HEIGHT: 62 IN | DIASTOLIC BLOOD PRESSURE: 80 MMHG

## 2025-07-31 DIAGNOSIS — F51.01 PRIMARY INSOMNIA: ICD-10-CM

## 2025-07-31 DIAGNOSIS — F41.1 GENERALIZED ANXIETY DISORDER WITH PANIC ATTACKS: ICD-10-CM

## 2025-07-31 DIAGNOSIS — F41.0 GENERALIZED ANXIETY DISORDER WITH PANIC ATTACKS: ICD-10-CM

## 2025-07-31 DIAGNOSIS — Z12.31 ENCOUNTER FOR SCREENING MAMMOGRAM FOR BREAST CANCER: Primary | ICD-10-CM

## 2025-07-31 DIAGNOSIS — F33.1 MAJOR DEPRESSIVE DISORDER, RECURRENT EPISODE, MODERATE (HCC): ICD-10-CM

## 2025-07-31 PROCEDURE — G2211 COMPLEX E/M VISIT ADD ON: HCPCS | Performed by: FAMILY MEDICINE

## 2025-07-31 PROCEDURE — 99214 OFFICE O/P EST MOD 30 MIN: CPT | Performed by: FAMILY MEDICINE

## 2025-07-31 RX ORDER — ALPRAZOLAM 0.5 MG
0.5 TABLET ORAL DAILY PRN
Qty: 30 TABLET | Refills: 0 | Status: SHIPPED | OUTPATIENT
Start: 2025-07-31

## 2025-08-04 ENCOUNTER — OFFICE VISIT (OUTPATIENT)
Dept: CARDIOLOGY CLINIC | Facility: CLINIC | Age: 65
End: 2025-08-04
Payer: MEDICARE

## 2025-08-04 VITALS
HEART RATE: 55 BPM | DIASTOLIC BLOOD PRESSURE: 84 MMHG | WEIGHT: 210 LBS | BODY MASS INDEX: 38.64 KG/M2 | SYSTOLIC BLOOD PRESSURE: 122 MMHG | TEMPERATURE: 98.1 F | HEIGHT: 62 IN | OXYGEN SATURATION: 97 %

## 2025-08-04 DIAGNOSIS — R06.83 SNORING: ICD-10-CM

## 2025-08-04 DIAGNOSIS — R07.89 CHEST TIGHTNESS: ICD-10-CM

## 2025-08-04 DIAGNOSIS — R00.2 PALPITATIONS: ICD-10-CM

## 2025-08-04 DIAGNOSIS — Z72.0 TOBACCO USE: ICD-10-CM

## 2025-08-04 DIAGNOSIS — E78.5 DYSLIPIDEMIA: ICD-10-CM

## 2025-08-04 DIAGNOSIS — R06.02 SHORTNESS OF BREATH: ICD-10-CM

## 2025-08-04 PROCEDURE — 99214 OFFICE O/P EST MOD 30 MIN: CPT

## 2025-08-04 RX ORDER — FUROSEMIDE 20 MG/1
20 TABLET ORAL DAILY PRN
Qty: 90 TABLET | Refills: 1 | Status: SHIPPED | OUTPATIENT
Start: 2025-08-04

## 2025-08-08 ENCOUNTER — TELEPHONE (OUTPATIENT)
Age: 65
End: 2025-08-08

## 2025-08-13 ENCOUNTER — TELEPHONE (OUTPATIENT)
Age: 65
End: 2025-08-13

## 2025-08-20 ENCOUNTER — TELEPHONE (OUTPATIENT)
Age: 65
End: 2025-08-20

## 2025-08-22 ENCOUNTER — DOCUMENTATION (OUTPATIENT)
Dept: ADMINISTRATIVE | Facility: OTHER | Age: 65
End: 2025-08-22